# Patient Record
Sex: FEMALE | Race: WHITE | Employment: OTHER | URBAN - NONMETROPOLITAN AREA
[De-identification: names, ages, dates, MRNs, and addresses within clinical notes are randomized per-mention and may not be internally consistent; named-entity substitution may affect disease eponyms.]

---

## 2017-01-11 ENCOUNTER — HOSPITAL ENCOUNTER (OUTPATIENT)
Dept: PAIN MANAGEMENT | Age: 52
Discharge: HOME | End: 2017-01-11
Payer: MEDICARE

## 2017-01-11 DIAGNOSIS — G56.23 ULNAR NEUROPATHY OF BOTH UPPER EXTREMITIES: ICD-10-CM

## 2017-01-11 PROCEDURE — 62369 ANAL SP INF PMP W/REPRG&FILL: CPT

## 2017-01-11 PROCEDURE — 2500000003 HC RX 250 WO HCPCS

## 2017-01-11 PROCEDURE — 6360000002 HC RX W HCPCS

## 2017-01-11 RX ORDER — PREGABALIN 150 MG/1
150 CAPSULE ORAL 2 TIMES DAILY
Qty: 60 CAPSULE | Refills: 2 | Status: SHIPPED | OUTPATIENT
Start: 2017-01-11 | End: 2017-04-20 | Stop reason: SDUPTHER

## 2017-01-11 RX ORDER — OXYCODONE AND ACETAMINOPHEN 10; 325 MG/1; MG/1
TABLET ORAL
Qty: 240 TABLET | Refills: 0 | Status: SHIPPED | OUTPATIENT
Start: 2017-01-11 | End: 2017-03-28 | Stop reason: SDUPTHER

## 2017-01-11 RX ORDER — TIZANIDINE 4 MG/1
8 TABLET ORAL 3 TIMES DAILY PRN
Qty: 180 TABLET | Refills: 2 | Status: SHIPPED | OUTPATIENT
Start: 2017-01-11 | End: 2017-04-20 | Stop reason: SDUPTHER

## 2017-01-11 RX ORDER — CARBAMAZEPINE 200 MG/1
200 TABLET ORAL 2 TIMES DAILY
Qty: 60 TABLET | Refills: 2 | Status: SHIPPED | OUTPATIENT
Start: 2017-01-11 | End: 2017-04-20 | Stop reason: SDUPTHER

## 2017-01-25 ENCOUNTER — HOSPITAL ENCOUNTER (OUTPATIENT)
Dept: GENERAL RADIOLOGY | Facility: HOSPITAL | Age: 52
Discharge: HOME | End: 2017-01-25
Admitting: INTERNAL MEDICINE

## 2017-01-25 ENCOUNTER — TRANSCRIBE ORDERS (OUTPATIENT)
Dept: GENERAL RADIOLOGY | Facility: HOSPITAL | Age: 52
End: 2017-01-25

## 2017-01-25 DIAGNOSIS — K59.00 UNSPECIFIED CONSTIPATION: Primary | ICD-10-CM

## 2017-01-25 PROCEDURE — 74000 HC ABDOMEN KUB: CPT

## 2017-02-14 ENCOUNTER — HOSPITAL ENCOUNTER (OUTPATIENT)
Facility: HOSPITAL | Age: 52
Discharge: HOME | End: 2017-03-20
Attending: INTERNAL MEDICINE | Admitting: INTERNAL MEDICINE

## 2017-02-14 ENCOUNTER — APPOINTMENT (OUTPATIENT)
Dept: GENERAL RADIOLOGY | Facility: HOSPITAL | Age: 52
End: 2017-02-14

## 2017-02-14 DIAGNOSIS — R13.12 OROPHARYNGEAL DYSPHAGIA: Primary | ICD-10-CM

## 2017-02-14 DIAGNOSIS — Z78.9 DECREASED ACTIVITIES OF DAILY LIVING (ADL): ICD-10-CM

## 2017-02-14 PROCEDURE — 25010000002 LORAZEPAM PER 2 MG: Performed by: INTERNAL MEDICINE

## 2017-02-14 PROCEDURE — 25010000002 MORPHINE SULFATE (PF) 2 MG/ML SOLUTION: Performed by: INTERNAL MEDICINE

## 2017-02-14 PROCEDURE — 93005 ELECTROCARDIOGRAM TRACING: CPT | Performed by: INTERNAL MEDICINE

## 2017-02-14 PROCEDURE — 71010 HC CHEST PA OR AP: CPT

## 2017-02-14 PROCEDURE — 63710000001 INSULIN LISPRO (HUMAN) PER 5 UNITS: Performed by: INTERNAL MEDICINE

## 2017-02-14 PROCEDURE — 25010000002 PIPERACILLIN SOD-TAZOBACTAM PER 1 G: Performed by: INTERNAL MEDICINE

## 2017-02-14 PROCEDURE — 25010000002 HYDROCORTISONE SODIUM SUCCINATE 100 MG RECONSTITUTED SOLUTION: Performed by: INTERNAL MEDICINE

## 2017-02-14 RX ORDER — FAMOTIDINE 10 MG/ML
20 INJECTION, SOLUTION INTRAVENOUS 2 TIMES DAILY
Status: DISCONTINUED | OUTPATIENT
Start: 2017-02-14 | End: 2017-02-22 | Stop reason: SDUPTHER

## 2017-02-14 RX ORDER — ALBUTEROL SULFATE 2.5 MG/3ML
2.5 SOLUTION RESPIRATORY (INHALATION)
Status: DISCONTINUED | OUTPATIENT
Start: 2017-02-14 | End: 2017-02-16

## 2017-02-14 RX ORDER — MINERAL OIL AND WHITE PETROLATUM 150; 830 MG/G; MG/G
OINTMENT OPHTHALMIC 2 TIMES DAILY
Status: DISCONTINUED | OUTPATIENT
Start: 2017-02-14 | End: 2017-03-20 | Stop reason: HOSPADM

## 2017-02-14 RX ORDER — HYDRALAZINE HYDROCHLORIDE 20 MG/ML
5 INJECTION INTRAMUSCULAR; INTRAVENOUS EVERY 8 HOURS PRN
Status: DISCONTINUED | OUTPATIENT
Start: 2017-02-14 | End: 2017-03-20 | Stop reason: HOSPADM

## 2017-02-14 RX ORDER — L.ACID,PARA/B.BIFIDUM/S.THERM 8B CELL
1 CAPSULE ORAL DAILY
Status: DISCONTINUED | OUTPATIENT
Start: 2017-02-14 | End: 2017-03-20 | Stop reason: HOSPADM

## 2017-02-14 RX ORDER — LORAZEPAM 2 MG/ML
2 INJECTION INTRAMUSCULAR
Status: DISCONTINUED | OUTPATIENT
Start: 2017-02-14 | End: 2017-02-25 | Stop reason: ALTCHOICE

## 2017-02-14 RX ORDER — SODIUM CHLORIDE 0.9 % (FLUSH) 0.9 %
10 SYRINGE (ML) INJECTION EVERY 12 HOURS
Status: DISCONTINUED | OUTPATIENT
Start: 2017-02-14 | End: 2017-03-06 | Stop reason: SDUPTHER

## 2017-02-14 RX ORDER — FLUCONAZOLE 2 MG/ML
200 INJECTION, SOLUTION INTRAVENOUS EVERY 24 HOURS
Status: DISCONTINUED | OUTPATIENT
Start: 2017-02-15 | End: 2017-02-22 | Stop reason: CLARIF

## 2017-02-14 RX ORDER — MIDODRINE HYDROCHLORIDE 10 MG/1
20 TABLET ORAL 3 TIMES DAILY
Status: DISCONTINUED | OUTPATIENT
Start: 2017-02-14 | End: 2017-02-19

## 2017-02-14 RX ORDER — ACETAMINOPHEN 160 MG/5ML
650 SOLUTION ORAL EVERY 4 HOURS PRN
Status: DISCONTINUED | OUTPATIENT
Start: 2017-02-14 | End: 2017-03-20 | Stop reason: HOSPADM

## 2017-02-14 RX ORDER — BUMETANIDE 0.25 MG/ML
2 INJECTION INTRAMUSCULAR; INTRAVENOUS EVERY 12 HOURS
Status: DISCONTINUED | OUTPATIENT
Start: 2017-02-14 | End: 2017-02-18 | Stop reason: DRUGHIGH

## 2017-02-14 RX ORDER — MORPHINE SULFATE 2 MG/ML
2 INJECTION, SOLUTION INTRAMUSCULAR; INTRAVENOUS
Status: DISCONTINUED | OUTPATIENT
Start: 2017-02-14 | End: 2017-02-25 | Stop reason: ALTCHOICE

## 2017-02-14 RX ORDER — ASCORBIC ACID 500 MG
500 TABLET ORAL 2 TIMES DAILY
Status: DISCONTINUED | OUTPATIENT
Start: 2017-02-14 | End: 2017-03-20 | Stop reason: HOSPADM

## 2017-02-14 RX ORDER — MIDAZOLAM HYDROCHLORIDE 1 MG/ML
5 INJECTION INTRAMUSCULAR; INTRAVENOUS
Status: DISCONTINUED | OUTPATIENT
Start: 2017-02-14 | End: 2017-02-25 | Stop reason: ALTCHOICE

## 2017-02-14 RX ORDER — METOPROLOL SUCCINATE 25 MG/1
TABLET, EXTENDED RELEASE ORAL
Qty: 30 TABLET | Refills: 2 | Status: SHIPPED | OUTPATIENT
Start: 2017-02-14 | End: 2017-05-18 | Stop reason: SDUPTHER

## 2017-02-14 RX ORDER — PREGABALIN 75 MG/1
75 CAPSULE ORAL 2 TIMES DAILY
Status: DISCONTINUED | OUTPATIENT
Start: 2017-02-14 | End: 2017-03-20 | Stop reason: HOSPADM

## 2017-02-14 RX ORDER — POLYETHYLENE GLYCOL 3350 17 G/17G
17 POWDER, FOR SOLUTION ORAL DAILY PRN
Status: DISCONTINUED | OUTPATIENT
Start: 2017-02-14 | End: 2017-03-20 | Stop reason: HOSPADM

## 2017-02-14 RX ORDER — SUCRALFATE ORAL 1 G/10ML
1 SUSPENSION ORAL EVERY 6 HOURS SCHEDULED
Status: DISCONTINUED | OUTPATIENT
Start: 2017-02-14 | End: 2017-03-20 | Stop reason: HOSPADM

## 2017-02-14 RX ORDER — METOLAZONE 2.5 MG/1
2.5 TABLET ORAL DAILY
Status: DISCONTINUED | OUTPATIENT
Start: 2017-02-15 | End: 2017-03-13

## 2017-02-14 RX ORDER — POTASSIUM CHLORIDE 20MEQ/15ML
40 LIQUID (ML) ORAL 3 TIMES DAILY
Status: DISCONTINUED | OUTPATIENT
Start: 2017-02-14 | End: 2017-02-16

## 2017-02-14 RX ORDER — OXYBUTYNIN CHLORIDE 5 MG/1
5 TABLET ORAL 2 TIMES DAILY
Status: DISCONTINUED | OUTPATIENT
Start: 2017-02-14 | End: 2017-03-20 | Stop reason: HOSPADM

## 2017-02-14 RX ORDER — MORPHINE SULFATE 10 MG/ML
10 INJECTION INTRAMUSCULAR; INTRAVENOUS; SUBCUTANEOUS AS NEEDED
Status: DISCONTINUED | OUTPATIENT
Start: 2017-02-14 | End: 2017-02-25 | Stop reason: ALTCHOICE

## 2017-02-14 RX ORDER — ZINC SULFATE 50(220)MG
220 CAPSULE ORAL DAILY
Status: DISCONTINUED | OUTPATIENT
Start: 2017-02-15 | End: 2017-03-20 | Stop reason: HOSPADM

## 2017-02-14 RX ORDER — PEDIATRIC MULTIPLE VITAMIN LIQ
5 LIQUID ORAL DAILY
Status: DISCONTINUED | OUTPATIENT
Start: 2017-02-15 | End: 2017-03-20 | Stop reason: HOSPADM

## 2017-02-14 RX ORDER — ESCITALOPRAM OXALATE 10 MG/1
20 TABLET ORAL DAILY
Status: DISCONTINUED | OUTPATIENT
Start: 2017-02-15 | End: 2017-03-20 | Stop reason: HOSPADM

## 2017-02-14 RX ORDER — AMITRIPTYLINE HYDROCHLORIDE 25 MG/1
25 TABLET, FILM COATED ORAL NIGHTLY
Status: DISCONTINUED | OUTPATIENT
Start: 2017-02-14 | End: 2017-02-17 | Stop reason: SDUPTHER

## 2017-02-14 RX ORDER — CELECOXIB 200 MG/1
200 CAPSULE ORAL 2 TIMES DAILY
Status: DISCONTINUED | OUTPATIENT
Start: 2017-02-14 | End: 2017-03-20 | Stop reason: HOSPADM

## 2017-02-14 RX ORDER — ONDANSETRON 2 MG/ML
4 INJECTION INTRAMUSCULAR; INTRAVENOUS EVERY 4 HOURS PRN
Status: DISCONTINUED | OUTPATIENT
Start: 2017-02-14 | End: 2017-03-20 | Stop reason: HOSPADM

## 2017-02-14 RX ORDER — MAGNESIUM HYDROXIDE/ALUMINUM HYDROXICE/SIMETHICONE 120; 1200; 1200 MG/30ML; MG/30ML; MG/30ML
30 SUSPENSION ORAL EVERY 6 HOURS PRN
Status: DISCONTINUED | OUTPATIENT
Start: 2017-02-14 | End: 2017-03-20 | Stop reason: HOSPADM

## 2017-02-14 RX ORDER — DOCUSATE SODIUM 100 MG/1
100 CAPSULE, LIQUID FILLED ORAL DAILY
Status: DISCONTINUED | OUTPATIENT
Start: 2017-02-14 | End: 2017-03-20 | Stop reason: HOSPADM

## 2017-02-14 RX ORDER — PANTOPRAZOLE SODIUM 40 MG/10ML
40 INJECTION, POWDER, LYOPHILIZED, FOR SOLUTION INTRAVENOUS
Status: DISCONTINUED | OUTPATIENT
Start: 2017-02-15 | End: 2017-02-22 | Stop reason: SDUPTHER

## 2017-02-14 RX ORDER — CARBAMAZEPINE 200 MG/1
200 TABLET ORAL 2 TIMES DAILY
Status: DISCONTINUED | OUTPATIENT
Start: 2017-02-14 | End: 2017-03-20 | Stop reason: HOSPADM

## 2017-02-14 RX ORDER — CHLORHEXIDINE GLUCONATE 0.12 MG/ML
15 RINSE ORAL EVERY 12 HOURS SCHEDULED
Status: DISCONTINUED | OUTPATIENT
Start: 2017-02-14 | End: 2017-03-20 | Stop reason: HOSPADM

## 2017-02-14 RX ORDER — ATORVASTATIN CALCIUM 40 MG/1
40 TABLET, FILM COATED ORAL NIGHTLY
Status: DISCONTINUED | OUTPATIENT
Start: 2017-02-14 | End: 2017-03-20 | Stop reason: HOSPADM

## 2017-02-14 RX ORDER — ENALAPRILAT 2.5 MG/2ML
0.62 INJECTION INTRAVENOUS EVERY 6 HOURS PRN
Status: DISCONTINUED | OUTPATIENT
Start: 2017-02-14 | End: 2017-03-20 | Stop reason: HOSPADM

## 2017-02-15 LAB
ALBUMIN SERPL-MCNC: 2.8 G/DL (ref 3.5–5)
ALBUMIN/GLOB SERPL: 1.1 G/DL (ref 1.1–2.5)
ALP SERPL-CCNC: 70 U/L (ref 24–120)
ALT SERPL W P-5'-P-CCNC: 42 U/L (ref 0–54)
ANION GAP SERPL CALCULATED.3IONS-SCNC: ABNORMAL MMOL/L (ref 4–13)
ARTERIAL PATENCY WRIST A: ABNORMAL
AST SERPL-CCNC: 42 U/L (ref 7–45)
ATMOSPHERIC PRESS: ABNORMAL MMHG
BASE EXCESS BLDA CALC-SCNC: 15.3 MMOL/L (ref -2–2)
BASOPHILS # BLD AUTO: 0.01 10*3/MM3 (ref 0–0.2)
BASOPHILS NFR BLD AUTO: 0.1 % (ref 0–2)
BDY SITE: ABNORMAL
BILIRUB SERPL-MCNC: 0.3 MG/DL (ref 0.1–1)
BUN BLD-MCNC: 16 MG/DL (ref 5–21)
BUN/CREAT SERPL: 47.1 (ref 7–25)
CALCIUM SPEC-SCNC: 8.2 MG/DL (ref 8.4–10.4)
CHLORIDE SERPL-SCNC: 92 MMOL/L (ref 98–110)
CO2 SERPL-SCNC: >40 MMOL/L (ref 24–31)
CREAT BLD-MCNC: 0.34 MG/DL (ref 0.5–1.4)
DEPRECATED RDW RBC AUTO: 73.5 FL (ref 40–54)
EOSINOPHIL # BLD AUTO: 0.01 10*3/MM3 (ref 0–0.7)
EOSINOPHIL NFR BLD AUTO: 0.1 % (ref 0–4)
ERYTHROCYTE [DISTWIDTH] IN BLOOD BY AUTOMATED COUNT: 21.7 % (ref 12–15)
GFR SERPL CREATININE-BSD FRML MDRD: >150 ML/MIN/1.73
GLOBULIN UR ELPH-MCNC: 2.5 GM/DL
GLUCOSE BLD-MCNC: 182 MG/DL (ref 70–100)
GLUCOSE BLDC GLUCOMTR-MCNC: 132 MG/DL (ref 70–130)
GLUCOSE BLDC GLUCOMTR-MCNC: 149 MG/DL (ref 70–130)
GLUCOSE BLDC GLUCOMTR-MCNC: 168 MG/DL (ref 70–130)
GLUCOSE BLDC GLUCOMTR-MCNC: 201 MG/DL (ref 70–130)
HCO3 BLDA-SCNC: 39.6 MMOL/L (ref 22–26)
HCT VFR BLD AUTO: 28.4 % (ref 37–47)
HGB BLD-MCNC: 8.9 G/DL (ref 12–16)
HOROWITZ INDEX BLD+IHG-RTO: 45 %
IMM GRANULOCYTES # BLD: 0.04 10*3/MM3 (ref 0–0.03)
IMM GRANULOCYTES NFR BLD: 0.4 % (ref 0–5)
LYMPHOCYTES # BLD AUTO: 0.23 10*3/MM3 (ref 0.72–4.86)
LYMPHOCYTES NFR BLD AUTO: 2.4 % (ref 15–45)
Lab: ABNORMAL
MCH RBC QN AUTO: 29.6 PG (ref 28–32)
MCHC RBC AUTO-ENTMCNC: 31.3 G/DL (ref 33–36)
MCV RBC AUTO: 94.4 FL (ref 82–98)
MODALITY: ABNORMAL
MONOCYTES # BLD AUTO: 0.14 10*3/MM3 (ref 0.19–1.3)
MONOCYTES NFR BLD AUTO: 1.5 % (ref 4–12)
NEUTROPHILS # BLD AUTO: 9.01 10*3/MM3 (ref 1.87–8.4)
NEUTROPHILS NFR BLD AUTO: 95.5 % (ref 39–78)
NOTIFIED BY: ABNORMAL
NOTIFIED WHO: ABNORMAL
PCO2 BLDA: 45.7 MM HG (ref 35–45)
PH BLDA: 7.56 PH UNITS (ref 7.35–7.45)
PLATELET # BLD AUTO: 127 10*3/MM3 (ref 130–400)
PMV BLD AUTO: 11.7 FL (ref 6–12)
PO2 BLDA: 108.4 MM HG (ref 80–100)
POTASSIUM BLD-SCNC: 2.9 MMOL/L (ref 3.5–5.3)
PREALB SERPL-MCNC: 26.2 MG/DL (ref 18–36)
PROT SERPL-MCNC: 5.3 G/DL (ref 6.3–8.7)
RBC # BLD AUTO: 3.01 10*6/MM3 (ref 4.2–5.4)
SAO2 % BLDCOA: 98.4 % (ref 94–100)
SAO2 % BLDCOA: 98.4 % (ref 94–100)
SODIUM BLD-SCNC: 143 MMOL/L (ref 135–145)
TOTAL RATE: 12 BREATHS/MINUTE
VENTILATOR MODE: AC
WBC NRBC COR # BLD: 9.44 10*3/MM3 (ref 4.8–10.8)

## 2017-02-15 PROCEDURE — 82962 GLUCOSE BLOOD TEST: CPT

## 2017-02-15 PROCEDURE — 84134 ASSAY OF PREALBUMIN: CPT | Performed by: INTERNAL MEDICINE

## 2017-02-15 PROCEDURE — 25010000002 LORAZEPAM PER 2 MG: Performed by: INTERNAL MEDICINE

## 2017-02-15 PROCEDURE — 25010000003 FLUCONAZOLE PER 200 MG: Performed by: INTERNAL MEDICINE

## 2017-02-15 PROCEDURE — 36600 WITHDRAWAL OF ARTERIAL BLOOD: CPT

## 2017-02-15 PROCEDURE — 63710000001 INSULIN LISPRO (HUMAN) PER 5 UNITS: Performed by: INTERNAL MEDICINE

## 2017-02-15 PROCEDURE — 97167 OT EVAL HIGH COMPLEX 60 MIN: CPT

## 2017-02-15 PROCEDURE — 93010 ELECTROCARDIOGRAM REPORT: CPT | Performed by: INTERNAL MEDICINE

## 2017-02-15 PROCEDURE — 97162 PT EVAL MOD COMPLEX 30 MIN: CPT | Performed by: PHYSICAL THERAPIST

## 2017-02-15 PROCEDURE — 25010000002 HYDROCORTISONE SODIUM SUCCINATE 100 MG RECONSTITUTED SOLUTION: Performed by: INTERNAL MEDICINE

## 2017-02-15 PROCEDURE — 85025 COMPLETE CBC W/AUTO DIFF WBC: CPT | Performed by: INTERNAL MEDICINE

## 2017-02-15 PROCEDURE — 80053 COMPREHEN METABOLIC PANEL: CPT | Performed by: INTERNAL MEDICINE

## 2017-02-15 PROCEDURE — 82803 BLOOD GASES ANY COMBINATION: CPT

## 2017-02-15 PROCEDURE — 25010000002 MORPHINE SULFATE (PF) 2 MG/ML SOLUTION: Performed by: INTERNAL MEDICINE

## 2017-02-15 PROCEDURE — 25010000002 PIPERACILLIN SOD-TAZOBACTAM PER 1 G: Performed by: INTERNAL MEDICINE

## 2017-02-15 PROCEDURE — 97605 NEG PRS WND THER DME<=50SQCM: CPT | Performed by: PHYSICAL THERAPIST

## 2017-02-15 RX ORDER — POTASSIUM CHLORIDE 750 MG/1
40 CAPSULE, EXTENDED RELEASE ORAL ONCE
Status: DISCONTINUED | OUTPATIENT
Start: 2017-02-15 | End: 2017-02-16

## 2017-02-16 LAB
ALBUMIN SERPL-MCNC: 2.9 G/DL (ref 3.5–5)
ALBUMIN/GLOB SERPL: 1 G/DL (ref 1.1–2.5)
ALP SERPL-CCNC: 76 U/L (ref 24–120)
ALT SERPL W P-5'-P-CCNC: 45 U/L (ref 0–54)
ANION GAP SERPL CALCULATED.3IONS-SCNC: ABNORMAL MMOL/L (ref 4–13)
AST SERPL-CCNC: 51 U/L (ref 7–45)
BASOPHILS # BLD AUTO: 0.01 10*3/MM3 (ref 0–0.2)
BASOPHILS NFR BLD AUTO: 0.2 % (ref 0–2)
BILIRUB SERPL-MCNC: 0.3 MG/DL (ref 0.1–1)
BUN BLD-MCNC: 17 MG/DL (ref 5–21)
BUN/CREAT SERPL: 53.1 (ref 7–25)
CALCIUM SPEC-SCNC: 8.3 MG/DL (ref 8.4–10.4)
CHLORIDE SERPL-SCNC: 88 MMOL/L (ref 98–110)
CO2 SERPL-SCNC: >40 MMOL/L (ref 24–31)
CREAT BLD-MCNC: 0.32 MG/DL (ref 0.5–1.4)
DEPRECATED RDW RBC AUTO: 74.8 FL (ref 40–54)
EOSINOPHIL # BLD AUTO: 0.02 10*3/MM3 (ref 0–0.7)
EOSINOPHIL NFR BLD AUTO: 0.3 % (ref 0–4)
ERYTHROCYTE [DISTWIDTH] IN BLOOD BY AUTOMATED COUNT: 21.4 % (ref 12–15)
ERYTHROCYTE [SEDIMENTATION RATE] IN BLOOD: 27 MM/HR (ref 0–20)
GFR SERPL CREATININE-BSD FRML MDRD: >150 ML/MIN/1.73
GLOBULIN UR ELPH-MCNC: 2.8 GM/DL
GLUCOSE BLD-MCNC: 141 MG/DL (ref 70–100)
GLUCOSE BLDC GLUCOMTR-MCNC: 108 MG/DL (ref 70–130)
GLUCOSE BLDC GLUCOMTR-MCNC: 124 MG/DL (ref 70–130)
GLUCOSE BLDC GLUCOMTR-MCNC: 149 MG/DL (ref 70–130)
GLUCOSE BLDC GLUCOMTR-MCNC: 199 MG/DL (ref 70–130)
HCT VFR BLD AUTO: 29.4 % (ref 37–47)
HGB BLD-MCNC: 9.1 G/DL (ref 12–16)
IMM GRANULOCYTES # BLD: 0.02 10*3/MM3 (ref 0–0.03)
IMM GRANULOCYTES NFR BLD: 0.3 % (ref 0–5)
LYMPHOCYTES # BLD AUTO: 0.4 10*3/MM3 (ref 0.72–4.86)
LYMPHOCYTES NFR BLD AUTO: 6.9 % (ref 15–45)
MCH RBC QN AUTO: 29.4 PG (ref 28–32)
MCHC RBC AUTO-ENTMCNC: 31 G/DL (ref 33–36)
MCV RBC AUTO: 94.8 FL (ref 82–98)
MONOCYTES # BLD AUTO: 0.23 10*3/MM3 (ref 0.19–1.3)
MONOCYTES NFR BLD AUTO: 4 % (ref 4–12)
NEUTROPHILS # BLD AUTO: 5.12 10*3/MM3 (ref 1.87–8.4)
NEUTROPHILS NFR BLD AUTO: 88.3 % (ref 39–78)
PLATELET # BLD AUTO: 142 10*3/MM3 (ref 130–400)
PMV BLD AUTO: 12.8 FL (ref 6–12)
POTASSIUM BLD-SCNC: 2.2 MMOL/L (ref 3.5–5.3)
PROT SERPL-MCNC: 5.7 G/DL (ref 6.3–8.7)
RBC # BLD AUTO: 3.1 10*6/MM3 (ref 4.2–5.4)
SODIUM BLD-SCNC: 141 MMOL/L (ref 135–145)
WBC NRBC COR # BLD: 5.8 10*3/MM3 (ref 4.8–10.8)

## 2017-02-16 PROCEDURE — 25010000002 HYDROCORTISONE SODIUM SUCCINATE 100 MG RECONSTITUTED SOLUTION: Performed by: INTERNAL MEDICINE

## 2017-02-16 PROCEDURE — 85651 RBC SED RATE NONAUTOMATED: CPT | Performed by: INTERNAL MEDICINE

## 2017-02-16 PROCEDURE — 82962 GLUCOSE BLOOD TEST: CPT

## 2017-02-16 PROCEDURE — 63710000001 INSULIN DETEMIR PER 5 UNITS: Performed by: NURSE PRACTITIONER

## 2017-02-16 PROCEDURE — 80053 COMPREHEN METABOLIC PANEL: CPT | Performed by: INTERNAL MEDICINE

## 2017-02-16 PROCEDURE — 25010000003 FLUCONAZOLE PER 200 MG: Performed by: INTERNAL MEDICINE

## 2017-02-16 PROCEDURE — 25010000002 POTASSIUM CHLORIDE PER 2 MEQ: Performed by: NURSE PRACTITIONER

## 2017-02-16 PROCEDURE — 25010000002 MORPHINE SULFATE (PF) 2 MG/ML SOLUTION: Performed by: INTERNAL MEDICINE

## 2017-02-16 PROCEDURE — 97535 SELF CARE MNGMENT TRAINING: CPT

## 2017-02-16 PROCEDURE — 85025 COMPLETE CBC W/AUTO DIFF WBC: CPT | Performed by: INTERNAL MEDICINE

## 2017-02-16 PROCEDURE — 97535 SELF CARE MNGMENT TRAINING: CPT | Performed by: OCCUPATIONAL THERAPIST

## 2017-02-16 PROCEDURE — 25010000002 PIPERACILLIN SOD-TAZOBACTAM PER 1 G: Performed by: INTERNAL MEDICINE

## 2017-02-16 PROCEDURE — 97530 THERAPEUTIC ACTIVITIES: CPT

## 2017-02-16 RX ORDER — POTASSIUM CHLORIDE 20MEQ/15ML
20 LIQUID (ML) ORAL ONCE
Status: DISCONTINUED | OUTPATIENT
Start: 2017-02-16 | End: 2017-02-18

## 2017-02-16 RX ORDER — POTASSIUM CHLORIDE 20MEQ/15ML
40 LIQUID (ML) ORAL DAILY
Status: DISCONTINUED | OUTPATIENT
Start: 2017-02-17 | End: 2017-02-23 | Stop reason: SDUPTHER

## 2017-02-16 RX ORDER — IPRATROPIUM BROMIDE AND ALBUTEROL SULFATE 2.5; .5 MG/3ML; MG/3ML
3 SOLUTION RESPIRATORY (INHALATION)
Status: DISCONTINUED | OUTPATIENT
Start: 2017-02-16 | End: 2017-03-06

## 2017-02-16 RX ORDER — POTASSIUM CHLORIDE 14.9 MG/ML
20 INJECTION INTRAVENOUS
Status: DISPENSED | OUTPATIENT
Start: 2017-02-16 | End: 2017-02-16

## 2017-02-17 LAB
ANION GAP SERPL CALCULATED.3IONS-SCNC: ABNORMAL MMOL/L (ref 4–13)
BUN BLD-MCNC: 19 MG/DL (ref 5–21)
BUN/CREAT SERPL: 70.4 (ref 7–25)
CALCIUM SPEC-SCNC: 8.4 MG/DL (ref 8.4–10.4)
CHLORIDE SERPL-SCNC: 86 MMOL/L (ref 98–110)
CO2 SERPL-SCNC: >40 MMOL/L (ref 24–31)
CREAT BLD-MCNC: 0.27 MG/DL (ref 0.5–1.4)
GFR SERPL CREATININE-BSD FRML MDRD: >150 ML/MIN/1.73
GLUCOSE BLD-MCNC: 122 MG/DL (ref 70–100)
GLUCOSE BLDC GLUCOMTR-MCNC: 129 MG/DL (ref 70–130)
GLUCOSE BLDC GLUCOMTR-MCNC: 137 MG/DL (ref 70–130)
GLUCOSE BLDC GLUCOMTR-MCNC: 141 MG/DL (ref 70–130)
GLUCOSE BLDC GLUCOMTR-MCNC: 166 MG/DL (ref 70–130)
GLUCOSE BLDC GLUCOMTR-MCNC: 93 MG/DL (ref 70–130)
MAGNESIUM SERPL-MCNC: 2.1 MG/DL (ref 1.4–2.2)
POTASSIUM BLD-SCNC: 2.5 MMOL/L (ref 3.5–5.3)
SODIUM BLD-SCNC: 139 MMOL/L (ref 135–145)

## 2017-02-17 PROCEDURE — 97535 SELF CARE MNGMENT TRAINING: CPT

## 2017-02-17 PROCEDURE — 25010000002 ONDANSETRON PER 1 MG: Performed by: INTERNAL MEDICINE

## 2017-02-17 PROCEDURE — 97110 THERAPEUTIC EXERCISES: CPT

## 2017-02-17 PROCEDURE — 92610 EVALUATE SWALLOWING FUNCTION: CPT

## 2017-02-17 PROCEDURE — 97530 THERAPEUTIC ACTIVITIES: CPT

## 2017-02-17 PROCEDURE — 80048 BASIC METABOLIC PNL TOTAL CA: CPT | Performed by: INTERNAL MEDICINE

## 2017-02-17 PROCEDURE — 63710000001 PROMETHAZINE PER 25 MG: Performed by: INTERNAL MEDICINE

## 2017-02-17 PROCEDURE — 92597 ORAL SPEECH DEVICE EVAL: CPT

## 2017-02-17 PROCEDURE — 25010000002 HYDRALAZINE PER 20 MG: Performed by: INTERNAL MEDICINE

## 2017-02-17 PROCEDURE — 97607 NEG PRS WND THR NDME<=50SQCM: CPT

## 2017-02-17 PROCEDURE — 25010000002 PIPERACILLIN SOD-TAZOBACTAM PER 1 G: Performed by: INTERNAL MEDICINE

## 2017-02-17 PROCEDURE — L8501 TRACHEOSTOMY SPEAKING VALVE: HCPCS

## 2017-02-17 PROCEDURE — 25010000003 POTASSIUM CHLORIDE PER 2 MEQ: Performed by: INTERNAL MEDICINE

## 2017-02-17 PROCEDURE — 97606 NEG PRS WND THER DME>50 SQCM: CPT

## 2017-02-17 PROCEDURE — 82962 GLUCOSE BLOOD TEST: CPT

## 2017-02-17 PROCEDURE — 25010000002 HYDROCORTISONE SODIUM SUCCINATE 100 MG RECONSTITUTED SOLUTION: Performed by: INTERNAL MEDICINE

## 2017-02-17 PROCEDURE — 25010000003 FLUCONAZOLE PER 200 MG: Performed by: INTERNAL MEDICINE

## 2017-02-17 PROCEDURE — 83735 ASSAY OF MAGNESIUM: CPT | Performed by: INTERNAL MEDICINE

## 2017-02-17 PROCEDURE — 25010000002 LORAZEPAM PER 2 MG: Performed by: INTERNAL MEDICINE

## 2017-02-17 RX ORDER — ALPRAZOLAM 0.25 MG/1
0.25 TABLET ORAL 2 TIMES DAILY PRN
Status: DISCONTINUED | OUTPATIENT
Start: 2017-02-17 | End: 2017-02-25

## 2017-02-17 RX ORDER — PROMETHAZINE HYDROCHLORIDE 6.25 MG/5ML
12.5 SYRUP ORAL EVERY 4 HOURS PRN
Status: DISCONTINUED | OUTPATIENT
Start: 2017-02-17 | End: 2017-03-20 | Stop reason: HOSPADM

## 2017-02-17 RX ORDER — POTASSIUM CHLORIDE 29.8 MG/ML
20 INJECTION INTRAVENOUS
Status: DISPENSED | OUTPATIENT
Start: 2017-02-17 | End: 2017-02-17

## 2017-02-17 RX ORDER — AMITRIPTYLINE HYDROCHLORIDE 25 MG/1
50 TABLET, FILM COATED ORAL NIGHTLY
Status: DISCONTINUED | OUTPATIENT
Start: 2017-02-17 | End: 2017-03-20 | Stop reason: HOSPADM

## 2017-02-18 LAB
ANION GAP SERPL CALCULATED.3IONS-SCNC: ABNORMAL MMOL/L (ref 4–13)
BUN BLD-MCNC: 22 MG/DL (ref 5–21)
BUN/CREAT SERPL: 84.6 (ref 7–25)
CALCIUM SPEC-SCNC: 8.8 MG/DL (ref 8.4–10.4)
CHLORIDE SERPL-SCNC: 86 MMOL/L (ref 98–110)
CO2 SERPL-SCNC: >40 MMOL/L (ref 24–31)
CREAT BLD-MCNC: 0.26 MG/DL (ref 0.5–1.4)
GFR SERPL CREATININE-BSD FRML MDRD: >150 ML/MIN/1.73
GLUCOSE BLD-MCNC: 129 MG/DL (ref 70–100)
GLUCOSE BLDC GLUCOMTR-MCNC: 148 MG/DL (ref 70–130)
GLUCOSE BLDC GLUCOMTR-MCNC: 168 MG/DL (ref 70–130)
GLUCOSE BLDC GLUCOMTR-MCNC: 172 MG/DL (ref 70–130)
POTASSIUM BLD-SCNC: 2.6 MMOL/L (ref 3.5–5.3)
POTASSIUM BLD-SCNC: 4.4 MMOL/L (ref 3.5–5.3)
SODIUM BLD-SCNC: 141 MMOL/L (ref 135–145)

## 2017-02-18 PROCEDURE — 97110 THERAPEUTIC EXERCISES: CPT

## 2017-02-18 PROCEDURE — 25010000002 HYDROCORTISONE SODIUM SUCCINATE 100 MG RECONSTITUTED SOLUTION: Performed by: INTERNAL MEDICINE

## 2017-02-18 PROCEDURE — 25010000002 PIPERACILLIN SOD-TAZOBACTAM PER 1 G: Performed by: INTERNAL MEDICINE

## 2017-02-18 PROCEDURE — 25010000003 FLUCONAZOLE PER 200 MG: Performed by: INTERNAL MEDICINE

## 2017-02-18 PROCEDURE — 80048 BASIC METABOLIC PNL TOTAL CA: CPT | Performed by: INTERNAL MEDICINE

## 2017-02-18 PROCEDURE — 25010000003 POTASSIUM CHLORIDE PER 2 MEQ: Performed by: INTERNAL MEDICINE

## 2017-02-18 PROCEDURE — 25010000002 MORPHINE SULFATE (PF) 2 MG/ML SOLUTION: Performed by: INTERNAL MEDICINE

## 2017-02-18 PROCEDURE — 82962 GLUCOSE BLOOD TEST: CPT

## 2017-02-18 PROCEDURE — 84132 ASSAY OF SERUM POTASSIUM: CPT | Performed by: INTERNAL MEDICINE

## 2017-02-18 RX ORDER — BUMETANIDE 0.25 MG/ML
1 INJECTION INTRAMUSCULAR; INTRAVENOUS DAILY
Status: DISCONTINUED | OUTPATIENT
Start: 2017-02-19 | End: 2017-02-23 | Stop reason: SDUPTHER

## 2017-02-18 RX ORDER — POTASSIUM CHLORIDE 29.8 MG/ML
20 INJECTION INTRAVENOUS
Status: DISPENSED | OUTPATIENT
Start: 2017-02-18 | End: 2017-02-18

## 2017-02-19 ENCOUNTER — APPOINTMENT (OUTPATIENT)
Dept: GENERAL RADIOLOGY | Facility: HOSPITAL | Age: 52
End: 2017-02-19

## 2017-02-19 LAB
GLUCOSE BLDC GLUCOMTR-MCNC: 128 MG/DL (ref 70–130)
GLUCOSE BLDC GLUCOMTR-MCNC: 155 MG/DL (ref 70–130)

## 2017-02-19 PROCEDURE — 71010 HC CHEST PA OR AP: CPT

## 2017-02-19 PROCEDURE — 82962 GLUCOSE BLOOD TEST: CPT

## 2017-02-19 PROCEDURE — 97110 THERAPEUTIC EXERCISES: CPT

## 2017-02-19 PROCEDURE — 92507 TX SP LANG VOICE COMM INDIV: CPT | Performed by: SPEECH-LANGUAGE PATHOLOGIST

## 2017-02-19 PROCEDURE — 25010000002 PIPERACILLIN SOD-TAZOBACTAM PER 1 G: Performed by: INTERNAL MEDICINE

## 2017-02-19 PROCEDURE — 25010000002 HYDROCORTISONE SODIUM SUCCINATE 100 MG RECONSTITUTED SOLUTION: Performed by: INTERNAL MEDICINE

## 2017-02-19 PROCEDURE — 25010000003 FLUCONAZOLE PER 200 MG: Performed by: INTERNAL MEDICINE

## 2017-02-19 PROCEDURE — 92526 ORAL FUNCTION THERAPY: CPT | Performed by: SPEECH-LANGUAGE PATHOLOGIST

## 2017-02-19 PROCEDURE — 97530 THERAPEUTIC ACTIVITIES: CPT

## 2017-02-19 RX ORDER — CLONIDINE HYDROCHLORIDE 0.1 MG/1
0.1 TABLET ORAL EVERY 4 HOURS PRN
Status: DISCONTINUED | OUTPATIENT
Start: 2017-02-19 | End: 2017-03-20 | Stop reason: HOSPADM

## 2017-02-20 ENCOUNTER — APPOINTMENT (OUTPATIENT)
Dept: GENERAL RADIOLOGY | Facility: HOSPITAL | Age: 52
End: 2017-02-20

## 2017-02-20 LAB
ANION GAP SERPL CALCULATED.3IONS-SCNC: ABNORMAL MMOL/L (ref 4–13)
BASOPHILS # BLD AUTO: 0.01 10*3/MM3 (ref 0–0.2)
BASOPHILS NFR BLD AUTO: 0.2 % (ref 0–2)
BUN BLD-MCNC: 21 MG/DL (ref 5–21)
BUN/CREAT SERPL: 75 (ref 7–25)
CALCIUM SPEC-SCNC: 8.8 MG/DL (ref 8.4–10.4)
CHLORIDE SERPL-SCNC: 92 MMOL/L (ref 98–110)
CO2 SERPL-SCNC: >40 MMOL/L (ref 24–31)
CREAT BLD-MCNC: 0.28 MG/DL (ref 0.5–1.4)
DEPRECATED RDW RBC AUTO: 71.2 FL (ref 40–54)
EOSINOPHIL # BLD AUTO: 0.06 10*3/MM3 (ref 0–0.7)
EOSINOPHIL NFR BLD AUTO: 1.1 % (ref 0–4)
ERYTHROCYTE [DISTWIDTH] IN BLOOD BY AUTOMATED COUNT: 20.4 % (ref 12–15)
GFR SERPL CREATININE-BSD FRML MDRD: >150 ML/MIN/1.73
GLUCOSE BLD-MCNC: 99 MG/DL (ref 70–100)
GLUCOSE BLDC GLUCOMTR-MCNC: 108 MG/DL (ref 70–130)
GLUCOSE BLDC GLUCOMTR-MCNC: 145 MG/DL (ref 70–130)
HCT VFR BLD AUTO: 29.8 % (ref 37–47)
HGB BLD-MCNC: 9.3 G/DL (ref 12–16)
IMM GRANULOCYTES # BLD: 0.06 10*3/MM3 (ref 0–0.03)
IMM GRANULOCYTES NFR BLD: 1.1 % (ref 0–5)
LYMPHOCYTES # BLD AUTO: 1.23 10*3/MM3 (ref 0.72–4.86)
LYMPHOCYTES NFR BLD AUTO: 21.7 % (ref 15–45)
MCH RBC QN AUTO: 29.8 PG (ref 28–32)
MCHC RBC AUTO-ENTMCNC: 31.2 G/DL (ref 33–36)
MCV RBC AUTO: 95.5 FL (ref 82–98)
MONOCYTES # BLD AUTO: 0.36 10*3/MM3 (ref 0.19–1.3)
MONOCYTES NFR BLD AUTO: 6.4 % (ref 4–12)
NEUTROPHILS # BLD AUTO: 3.94 10*3/MM3 (ref 1.87–8.4)
NEUTROPHILS NFR BLD AUTO: 69.5 % (ref 39–78)
PLATELET # BLD AUTO: 142 10*3/MM3 (ref 130–400)
PMV BLD AUTO: 11.9 FL (ref 6–12)
POTASSIUM BLD-SCNC: 2.7 MMOL/L (ref 3.5–5.3)
RBC # BLD AUTO: 3.12 10*6/MM3 (ref 4.2–5.4)
SODIUM BLD-SCNC: 140 MMOL/L (ref 135–145)
WBC NRBC COR # BLD: 5.66 10*3/MM3 (ref 4.8–10.8)

## 2017-02-20 PROCEDURE — 92611 MOTION FLUOROSCOPY/SWALLOW: CPT | Performed by: SPEECH-LANGUAGE PATHOLOGIST

## 2017-02-20 PROCEDURE — 25010000003 POTASSIUM CHLORIDE PER 2 MEQ: Performed by: INTERNAL MEDICINE

## 2017-02-20 PROCEDURE — 80048 BASIC METABOLIC PNL TOTAL CA: CPT | Performed by: INTERNAL MEDICINE

## 2017-02-20 PROCEDURE — 25010000002 PIPERACILLIN SOD-TAZOBACTAM PER 1 G: Performed by: INTERNAL MEDICINE

## 2017-02-20 PROCEDURE — 82962 GLUCOSE BLOOD TEST: CPT

## 2017-02-20 PROCEDURE — 97110 THERAPEUTIC EXERCISES: CPT

## 2017-02-20 PROCEDURE — 25010000002 MORPHINE SULFATE (PF) 2 MG/ML SOLUTION: Performed by: INTERNAL MEDICINE

## 2017-02-20 PROCEDURE — G8996 SWALLOW CURRENT STATUS: HCPCS | Performed by: SPEECH-LANGUAGE PATHOLOGIST

## 2017-02-20 PROCEDURE — 25010000003 FLUCONAZOLE PER 200 MG: Performed by: INTERNAL MEDICINE

## 2017-02-20 PROCEDURE — 97605 NEG PRS WND THER DME<=50SQCM: CPT | Performed by: PHYSICAL THERAPIST

## 2017-02-20 PROCEDURE — 97535 SELF CARE MNGMENT TRAINING: CPT

## 2017-02-20 PROCEDURE — G8997 SWALLOW GOAL STATUS: HCPCS | Performed by: SPEECH-LANGUAGE PATHOLOGIST

## 2017-02-20 PROCEDURE — 25010000002 HYDROCORTISONE SODIUM SUCCINATE 100 MG RECONSTITUTED SOLUTION: Performed by: INTERNAL MEDICINE

## 2017-02-20 PROCEDURE — 97607 NEG PRS WND THR NDME<=50SQCM: CPT | Performed by: PHYSICAL THERAPIST

## 2017-02-20 PROCEDURE — G8998 SWALLOW D/C STATUS: HCPCS | Performed by: SPEECH-LANGUAGE PATHOLOGIST

## 2017-02-20 PROCEDURE — 85025 COMPLETE CBC W/AUTO DIFF WBC: CPT | Performed by: INTERNAL MEDICINE

## 2017-02-20 PROCEDURE — 74230 X-RAY XM SWLNG FUNCJ C+: CPT

## 2017-02-20 PROCEDURE — 97530 THERAPEUTIC ACTIVITIES: CPT | Performed by: PHYSICAL THERAPIST

## 2017-02-20 RX ORDER — TIZANIDINE 4 MG/1
4 TABLET ORAL EVERY 8 HOURS PRN
Status: DISCONTINUED | OUTPATIENT
Start: 2017-02-20 | End: 2017-02-27

## 2017-02-20 RX ORDER — POTASSIUM CHLORIDE 29.8 MG/ML
20 INJECTION INTRAVENOUS
Status: DISPENSED | OUTPATIENT
Start: 2017-02-20 | End: 2017-02-20

## 2017-02-20 RX ORDER — MIDODRINE HYDROCHLORIDE 2.5 MG/1
5 TABLET ORAL DAILY
Status: DISCONTINUED | OUTPATIENT
Start: 2017-02-20 | End: 2017-03-20 | Stop reason: HOSPADM

## 2017-02-21 LAB
ANION GAP SERPL CALCULATED.3IONS-SCNC: 6 MMOL/L (ref 4–13)
BUN BLD-MCNC: 26 MG/DL (ref 5–21)
BUN/CREAT SERPL: 92.9 (ref 7–25)
CALCIUM SPEC-SCNC: 8.9 MG/DL (ref 8.4–10.4)
CHLORIDE SERPL-SCNC: 98 MMOL/L (ref 98–110)
CO2 SERPL-SCNC: 36 MMOL/L (ref 24–31)
CREAT BLD-MCNC: 0.28 MG/DL (ref 0.5–1.4)
GFR SERPL CREATININE-BSD FRML MDRD: >150 ML/MIN/1.73
GLUCOSE BLD-MCNC: 102 MG/DL (ref 70–100)
MAGNESIUM SERPL-MCNC: 2.2 MG/DL (ref 1.4–2.2)
POTASSIUM BLD-SCNC: 4.6 MMOL/L (ref 3.5–5.3)
SODIUM BLD-SCNC: 140 MMOL/L (ref 135–145)

## 2017-02-21 PROCEDURE — 25010000002 HYDROCORTISONE SODIUM SUCCINATE 100 MG RECONSTITUTED SOLUTION: Performed by: INTERNAL MEDICINE

## 2017-02-21 PROCEDURE — 92526 ORAL FUNCTION THERAPY: CPT

## 2017-02-21 PROCEDURE — 97535 SELF CARE MNGMENT TRAINING: CPT

## 2017-02-21 PROCEDURE — 97110 THERAPEUTIC EXERCISES: CPT

## 2017-02-21 PROCEDURE — 25010000002 LORAZEPAM PER 2 MG: Performed by: INTERNAL MEDICINE

## 2017-02-21 PROCEDURE — 80048 BASIC METABOLIC PNL TOTAL CA: CPT | Performed by: NURSE PRACTITIONER

## 2017-02-21 PROCEDURE — 83735 ASSAY OF MAGNESIUM: CPT | Performed by: NURSE PRACTITIONER

## 2017-02-21 PROCEDURE — 25010000002 MORPHINE SULFATE (PF) 2 MG/ML SOLUTION: Performed by: INTERNAL MEDICINE

## 2017-02-21 PROCEDURE — 97530 THERAPEUTIC ACTIVITIES: CPT

## 2017-02-21 PROCEDURE — 25010000002 PIPERACILLIN SOD-TAZOBACTAM PER 1 G: Performed by: INTERNAL MEDICINE

## 2017-02-21 PROCEDURE — 25010000003 FLUCONAZOLE PER 200 MG: Performed by: INTERNAL MEDICINE

## 2017-02-22 PROCEDURE — 25010000003 FLUCONAZOLE PER 200 MG: Performed by: INTERNAL MEDICINE

## 2017-02-22 PROCEDURE — 25010000002 HYDROCORTISONE SODIUM SUCCINATE 100 MG RECONSTITUTED SOLUTION: Performed by: INTERNAL MEDICINE

## 2017-02-22 PROCEDURE — 97607 NEG PRS WND THR NDME<=50SQCM: CPT | Performed by: PHYSICAL THERAPIST

## 2017-02-22 PROCEDURE — 92526 ORAL FUNCTION THERAPY: CPT

## 2017-02-22 PROCEDURE — 25010000002 ENOXAPARIN PER 10 MG: Performed by: INTERNAL MEDICINE

## 2017-02-22 PROCEDURE — 25010000002 PIPERACILLIN SOD-TAZOBACTAM PER 1 G: Performed by: INTERNAL MEDICINE

## 2017-02-22 PROCEDURE — 25010000002 MORPHINE SULFATE (PF) 2 MG/ML SOLUTION: Performed by: INTERNAL MEDICINE

## 2017-02-22 RX ORDER — LANSOPRAZOLE
30 KIT EVERY MORNING
Status: DISCONTINUED | OUTPATIENT
Start: 2017-02-23 | End: 2017-03-20 | Stop reason: HOSPADM

## 2017-02-22 RX ORDER — DIAPER,BRIEF,INFANT-TODD,DISP
EACH MISCELLANEOUS 2 TIMES DAILY
Status: DISCONTINUED | OUTPATIENT
Start: 2017-02-22 | End: 2017-03-20 | Stop reason: HOSPADM

## 2017-02-22 RX ORDER — FLUCONAZOLE 150 MG/1
150 TABLET ORAL DAILY
Status: DISCONTINUED | OUTPATIENT
Start: 2017-02-23 | End: 2017-03-14 | Stop reason: ALTCHOICE

## 2017-02-23 LAB
ANION GAP SERPL CALCULATED.3IONS-SCNC: 7 MMOL/L (ref 4–13)
BUN BLD-MCNC: 23 MG/DL (ref 5–21)
BUN/CREAT SERPL: 71.9 (ref 7–25)
CALCIUM SPEC-SCNC: 8.9 MG/DL (ref 8.4–10.4)
CHLORIDE SERPL-SCNC: 96 MMOL/L (ref 98–110)
CO2 SERPL-SCNC: 37 MMOL/L (ref 24–31)
CREAT BLD-MCNC: 0.32 MG/DL (ref 0.5–1.4)
DEPRECATED RDW RBC AUTO: 75.1 FL (ref 40–54)
ERYTHROCYTE [DISTWIDTH] IN BLOOD BY AUTOMATED COUNT: 21.8 % (ref 12–15)
GFR SERPL CREATININE-BSD FRML MDRD: >150 ML/MIN/1.73
GLUCOSE BLD-MCNC: 130 MG/DL (ref 70–100)
GLUCOSE BLDC GLUCOMTR-MCNC: 130 MG/DL (ref 70–130)
HCT VFR BLD AUTO: 28.7 % (ref 37–47)
HGB BLD-MCNC: 8.8 G/DL (ref 12–16)
LYMPHOCYTES # BLD MANUAL: 0.66 10*3/MM3 (ref 0.72–4.86)
LYMPHOCYTES NFR BLD MANUAL: 15 % (ref 15–45)
LYMPHOCYTES NFR BLD MANUAL: 8 % (ref 4–12)
MCH RBC QN AUTO: 29.6 PG (ref 28–32)
MCHC RBC AUTO-ENTMCNC: 30.7 G/DL (ref 33–36)
MCV RBC AUTO: 96.6 FL (ref 82–98)
MONOCYTES # BLD AUTO: 0.35 10*3/MM3 (ref 0.19–1.3)
NEUTROPHILS # BLD AUTO: 3.41 10*3/MM3 (ref 1.87–8.4)
NEUTROPHILS NFR BLD MANUAL: 77 % (ref 39–78)
PLAT MORPH BLD: NORMAL
PLATELET # BLD AUTO: 164 10*3/MM3 (ref 130–400)
PMV BLD AUTO: 11.9 FL (ref 6–12)
POIKILOCYTOSIS BLD QL SMEAR: ABNORMAL
POTASSIUM BLD-SCNC: 2.6 MMOL/L (ref 3.5–5.3)
RBC # BLD AUTO: 2.97 10*6/MM3 (ref 4.2–5.4)
SODIUM BLD-SCNC: 140 MMOL/L (ref 135–145)
WBC MORPH BLD: NORMAL
WBC NRBC COR # BLD: 4.43 10*3/MM3 (ref 4.8–10.8)

## 2017-02-23 PROCEDURE — 92526 ORAL FUNCTION THERAPY: CPT

## 2017-02-23 PROCEDURE — 80048 BASIC METABOLIC PNL TOTAL CA: CPT | Performed by: NURSE PRACTITIONER

## 2017-02-23 PROCEDURE — 25010000002 ENOXAPARIN PER 10 MG: Performed by: INTERNAL MEDICINE

## 2017-02-23 PROCEDURE — 25010000002 HYDROCORTISONE SODIUM SUCCINATE 100 MG RECONSTITUTED SOLUTION: Performed by: INTERNAL MEDICINE

## 2017-02-23 PROCEDURE — 97110 THERAPEUTIC EXERCISES: CPT

## 2017-02-23 PROCEDURE — 97530 THERAPEUTIC ACTIVITIES: CPT

## 2017-02-23 PROCEDURE — 25010000002 MORPHINE SULFATE (PF) 2 MG/ML SOLUTION: Performed by: INTERNAL MEDICINE

## 2017-02-23 PROCEDURE — 82962 GLUCOSE BLOOD TEST: CPT

## 2017-02-23 PROCEDURE — 85027 COMPLETE CBC AUTOMATED: CPT | Performed by: NURSE PRACTITIONER

## 2017-02-23 PROCEDURE — 25010000002 PIPERACILLIN SOD-TAZOBACTAM PER 1 G: Performed by: INTERNAL MEDICINE

## 2017-02-23 PROCEDURE — 85007 BL SMEAR W/DIFF WBC COUNT: CPT | Performed by: NURSE PRACTITIONER

## 2017-02-23 RX ORDER — BUMETANIDE 0.25 MG/ML
0.5 INJECTION INTRAMUSCULAR; INTRAVENOUS DAILY
Status: DISCONTINUED | OUTPATIENT
Start: 2017-02-24 | End: 2017-02-28 | Stop reason: SDUPTHER

## 2017-02-23 RX ORDER — POTASSIUM CHLORIDE 20MEQ/15ML
40 LIQUID (ML) ORAL 3 TIMES DAILY
Status: DISCONTINUED | OUTPATIENT
Start: 2017-02-23 | End: 2017-03-01

## 2017-02-24 LAB
ANION GAP SERPL CALCULATED.3IONS-SCNC: 11 MMOL/L (ref 4–13)
BUN BLD-MCNC: 22 MG/DL (ref 5–21)
BUN/CREAT SERPL: 66.7 (ref 7–25)
CALCIUM SPEC-SCNC: 9.3 MG/DL (ref 8.4–10.4)
CHLORIDE SERPL-SCNC: 99 MMOL/L (ref 98–110)
CO2 SERPL-SCNC: 33 MMOL/L (ref 24–31)
CREAT BLD-MCNC: 0.33 MG/DL (ref 0.5–1.4)
GFR SERPL CREATININE-BSD FRML MDRD: >150 ML/MIN/1.73
GLUCOSE BLD-MCNC: 121 MG/DL (ref 70–100)
MAGNESIUM SERPL-MCNC: 2.1 MG/DL (ref 1.4–2.2)
POTASSIUM BLD-SCNC: 3.6 MMOL/L (ref 3.5–5.3)
SODIUM BLD-SCNC: 143 MMOL/L (ref 135–145)

## 2017-02-24 PROCEDURE — 92526 ORAL FUNCTION THERAPY: CPT | Performed by: SPEECH-LANGUAGE PATHOLOGIST

## 2017-02-24 PROCEDURE — 25010000002 ENOXAPARIN PER 10 MG: Performed by: INTERNAL MEDICINE

## 2017-02-24 PROCEDURE — 80048 BASIC METABOLIC PNL TOTAL CA: CPT | Performed by: NURSE PRACTITIONER

## 2017-02-24 PROCEDURE — 97535 SELF CARE MNGMENT TRAINING: CPT

## 2017-02-24 PROCEDURE — 83735 ASSAY OF MAGNESIUM: CPT | Performed by: NURSE PRACTITIONER

## 2017-02-24 PROCEDURE — 97607 NEG PRS WND THR NDME<=50SQCM: CPT

## 2017-02-24 PROCEDURE — 97530 THERAPEUTIC ACTIVITIES: CPT

## 2017-02-24 PROCEDURE — 25010000002 MORPHINE SULFATE (PF) 2 MG/ML SOLUTION: Performed by: INTERNAL MEDICINE

## 2017-02-24 PROCEDURE — 25010000002 HYDROCORTISONE SODIUM SUCCINATE 100 MG RECONSTITUTED SOLUTION: Performed by: INTERNAL MEDICINE

## 2017-02-24 PROCEDURE — 25010000002 PIPERACILLIN SOD-TAZOBACTAM PER 1 G: Performed by: INTERNAL MEDICINE

## 2017-02-24 PROCEDURE — 25010000002 LORAZEPAM PER 2 MG: Performed by: INTERNAL MEDICINE

## 2017-02-25 LAB
ANION GAP SERPL CALCULATED.3IONS-SCNC: 10 MMOL/L (ref 4–13)
BUN BLD-MCNC: 27 MG/DL (ref 5–21)
BUN/CREAT SERPL: 100 (ref 7–25)
CALCIUM SPEC-SCNC: 9.3 MG/DL (ref 8.4–10.4)
CHLORIDE SERPL-SCNC: 103 MMOL/L (ref 98–110)
CO2 SERPL-SCNC: 30 MMOL/L (ref 24–31)
CREAT BLD-MCNC: 0.27 MG/DL (ref 0.5–1.4)
GFR SERPL CREATININE-BSD FRML MDRD: >150 ML/MIN/1.73
GLUCOSE BLD-MCNC: 135 MG/DL (ref 70–100)
POTASSIUM BLD-SCNC: 4.3 MMOL/L (ref 3.5–5.3)
SODIUM BLD-SCNC: 143 MMOL/L (ref 135–145)

## 2017-02-25 PROCEDURE — 25010000002 MORPHINE SULFATE (PF) 2 MG/ML SOLUTION: Performed by: INTERNAL MEDICINE

## 2017-02-25 PROCEDURE — 97530 THERAPEUTIC ACTIVITIES: CPT

## 2017-02-25 PROCEDURE — 25010000002 HYDROCORTISONE SODIUM SUCCINATE 100 MG RECONSTITUTED SOLUTION: Performed by: INTERNAL MEDICINE

## 2017-02-25 PROCEDURE — 80048 BASIC METABOLIC PNL TOTAL CA: CPT | Performed by: NURSE PRACTITIONER

## 2017-02-25 PROCEDURE — 25010000002 ENOXAPARIN PER 10 MG: Performed by: INTERNAL MEDICINE

## 2017-02-25 PROCEDURE — 25010000002 PIPERACILLIN SOD-TAZOBACTAM PER 1 G: Performed by: INTERNAL MEDICINE

## 2017-02-25 RX ORDER — ALPRAZOLAM 0.5 MG/1
0.5 TABLET ORAL 2 TIMES DAILY PRN
Status: DISPENSED | OUTPATIENT
Start: 2017-02-25 | End: 2017-02-27

## 2017-02-25 RX ORDER — MORPHINE SULFATE 2 MG/ML
2 INJECTION, SOLUTION INTRAMUSCULAR; INTRAVENOUS
Status: DISCONTINUED | OUTPATIENT
Start: 2017-02-25 | End: 2017-03-02 | Stop reason: DRUGHIGH

## 2017-02-26 LAB
ANION GAP SERPL CALCULATED.3IONS-SCNC: 9 MMOL/L (ref 4–13)
BUN BLD-MCNC: 21 MG/DL (ref 5–21)
BUN/CREAT SERPL: 67.7 (ref 7–25)
CALCIUM SPEC-SCNC: 9.8 MG/DL (ref 8.4–10.4)
CHLORIDE SERPL-SCNC: 105 MMOL/L (ref 98–110)
CO2 SERPL-SCNC: 29 MMOL/L (ref 24–31)
CREAT BLD-MCNC: 0.31 MG/DL (ref 0.5–1.4)
GFR SERPL CREATININE-BSD FRML MDRD: >150 ML/MIN/1.73
GLUCOSE BLD-MCNC: 98 MG/DL (ref 70–100)
POTASSIUM BLD-SCNC: 4.2 MMOL/L (ref 3.5–5.3)
SODIUM BLD-SCNC: 143 MMOL/L (ref 135–145)

## 2017-02-26 PROCEDURE — 80048 BASIC METABOLIC PNL TOTAL CA: CPT | Performed by: NURSE PRACTITIONER

## 2017-02-26 PROCEDURE — 25010000002 HYDROCORTISONE SODIUM SUCCINATE 100 MG RECONSTITUTED SOLUTION: Performed by: INTERNAL MEDICINE

## 2017-02-26 PROCEDURE — 25010000002 MORPHINE SULFATE (PF) 2 MG/ML SOLUTION: Performed by: INTERNAL MEDICINE

## 2017-02-26 PROCEDURE — 25010000002 HYDRALAZINE PER 20 MG: Performed by: INTERNAL MEDICINE

## 2017-02-26 PROCEDURE — 25010000002 PIPERACILLIN SOD-TAZOBACTAM PER 1 G: Performed by: INTERNAL MEDICINE

## 2017-02-26 PROCEDURE — 25010000002 ENOXAPARIN PER 10 MG: Performed by: INTERNAL MEDICINE

## 2017-02-27 LAB
ALBUMIN SERPL-MCNC: 3.2 G/DL (ref 3.5–5)
ALBUMIN/GLOB SERPL: 1.1 G/DL (ref 1.1–2.5)
ALP SERPL-CCNC: 81 U/L (ref 24–120)
ALT SERPL W P-5'-P-CCNC: 63 U/L (ref 0–54)
ANION GAP SERPL CALCULATED.3IONS-SCNC: 11 MMOL/L (ref 4–13)
AST SERPL-CCNC: 36 U/L (ref 7–45)
BASOPHILS # BLD AUTO: 0.02 10*3/MM3 (ref 0–0.2)
BASOPHILS NFR BLD AUTO: 0.4 % (ref 0–2)
BILIRUB SERPL-MCNC: 0.3 MG/DL (ref 0.1–1)
BUN BLD-MCNC: 20 MG/DL (ref 5–21)
BUN/CREAT SERPL: 64.5 (ref 7–25)
CALCIUM SPEC-SCNC: 9.6 MG/DL (ref 8.4–10.4)
CHLORIDE SERPL-SCNC: 106 MMOL/L (ref 98–110)
CO2 SERPL-SCNC: 24 MMOL/L (ref 24–31)
CREAT BLD-MCNC: 0.31 MG/DL (ref 0.5–1.4)
DEPRECATED RDW RBC AUTO: 79.4 FL (ref 40–54)
EOSINOPHIL # BLD AUTO: 0.07 10*3/MM3 (ref 0–0.7)
EOSINOPHIL NFR BLD AUTO: 1.2 % (ref 0–4)
ERYTHROCYTE [DISTWIDTH] IN BLOOD BY AUTOMATED COUNT: 22.7 % (ref 12–15)
GFR SERPL CREATININE-BSD FRML MDRD: >150 ML/MIN/1.73
GLOBULIN UR ELPH-MCNC: 3 GM/DL
GLUCOSE BLD-MCNC: 113 MG/DL (ref 70–100)
HCT VFR BLD AUTO: 30.1 % (ref 37–47)
HGB BLD-MCNC: 9.5 G/DL (ref 12–16)
IMM GRANULOCYTES # BLD: 0.11 10*3/MM3 (ref 0–0.03)
IMM GRANULOCYTES NFR BLD: 1.9 % (ref 0–5)
LYMPHOCYTES # BLD AUTO: 0.6 10*3/MM3 (ref 0.72–4.86)
LYMPHOCYTES NFR BLD AUTO: 10.6 % (ref 15–45)
MCH RBC QN AUTO: 30.5 PG (ref 28–32)
MCHC RBC AUTO-ENTMCNC: 31.6 G/DL (ref 33–36)
MCV RBC AUTO: 96.8 FL (ref 82–98)
MONOCYTES # BLD AUTO: 0.24 10*3/MM3 (ref 0.19–1.3)
MONOCYTES NFR BLD AUTO: 4.2 % (ref 4–12)
NEUTROPHILS # BLD AUTO: 4.63 10*3/MM3 (ref 1.87–8.4)
NEUTROPHILS NFR BLD AUTO: 81.7 % (ref 39–78)
NRBC BLD MANUAL-RTO: 0 /100 WBC (ref 0–0)
PLATELET # BLD AUTO: 203 10*3/MM3 (ref 130–400)
PMV BLD AUTO: 11.5 FL (ref 6–12)
POTASSIUM BLD-SCNC: 4.5 MMOL/L (ref 3.5–5.3)
PREALB SERPL-MCNC: 52.9 MG/DL (ref 18–36)
PROT SERPL-MCNC: 6.2 G/DL (ref 6.3–8.7)
RBC # BLD AUTO: 3.11 10*6/MM3 (ref 4.2–5.4)
SODIUM BLD-SCNC: 141 MMOL/L (ref 135–145)
WBC NRBC COR # BLD: 5.67 10*3/MM3 (ref 4.8–10.8)

## 2017-02-27 PROCEDURE — 85025 COMPLETE CBC W/AUTO DIFF WBC: CPT | Performed by: INTERNAL MEDICINE

## 2017-02-27 PROCEDURE — 97530 THERAPEUTIC ACTIVITIES: CPT

## 2017-02-27 PROCEDURE — 25010000002 ENOXAPARIN PER 10 MG: Performed by: INTERNAL MEDICINE

## 2017-02-27 PROCEDURE — 25010000002 HYDROCORTISONE SODIUM SUCCINATE 100 MG RECONSTITUTED SOLUTION: Performed by: INTERNAL MEDICINE

## 2017-02-27 PROCEDURE — 97110 THERAPEUTIC EXERCISES: CPT

## 2017-02-27 PROCEDURE — 84134 ASSAY OF PREALBUMIN: CPT | Performed by: INTERNAL MEDICINE

## 2017-02-27 PROCEDURE — 80053 COMPREHEN METABOLIC PANEL: CPT | Performed by: INTERNAL MEDICINE

## 2017-02-27 PROCEDURE — 25010000002 PIPERACILLIN SOD-TAZOBACTAM PER 1 G: Performed by: INTERNAL MEDICINE

## 2017-02-27 PROCEDURE — 97607 NEG PRS WND THR NDME<=50SQCM: CPT

## 2017-02-27 PROCEDURE — 25010000002 MORPHINE SULFATE (PF) 2 MG/ML SOLUTION: Performed by: INTERNAL MEDICINE

## 2017-02-27 PROCEDURE — 92526 ORAL FUNCTION THERAPY: CPT

## 2017-02-27 RX ORDER — BACLOFEN 10 MG/1
20 TABLET ORAL 4 TIMES DAILY
Status: DISCONTINUED | OUTPATIENT
Start: 2017-02-27 | End: 2017-03-20 | Stop reason: HOSPADM

## 2017-02-27 RX ORDER — ALPRAZOLAM 0.5 MG/1
0.5 TABLET ORAL 2 TIMES DAILY PRN
Status: DISPENSED | OUTPATIENT
Start: 2017-02-27 | End: 2017-03-01

## 2017-02-27 RX ORDER — TIZANIDINE 4 MG/1
8 TABLET ORAL EVERY 8 HOURS PRN
Status: DISCONTINUED | OUTPATIENT
Start: 2017-02-27 | End: 2017-03-09

## 2017-02-27 RX ORDER — SACCHAROMYCES BOULARDII 250 MG
250 CAPSULE ORAL 2 TIMES DAILY
Status: DISCONTINUED | OUTPATIENT
Start: 2017-02-27 | End: 2017-03-20 | Stop reason: HOSPADM

## 2017-02-28 LAB
ANION GAP SERPL CALCULATED.3IONS-SCNC: 8 MMOL/L (ref 4–13)
BUN BLD-MCNC: 18 MG/DL (ref 5–21)
BUN/CREAT SERPL: 60 (ref 7–25)
CALCIUM SPEC-SCNC: 9.3 MG/DL (ref 8.4–10.4)
CHLORIDE SERPL-SCNC: 106 MMOL/L (ref 98–110)
CO2 SERPL-SCNC: 26 MMOL/L (ref 24–31)
CREAT BLD-MCNC: 0.3 MG/DL (ref 0.5–1.4)
GFR SERPL CREATININE-BSD FRML MDRD: >150 ML/MIN/1.73
GLUCOSE BLD-MCNC: 127 MG/DL (ref 70–100)
POTASSIUM BLD-SCNC: 4.3 MMOL/L (ref 3.5–5.3)
SODIUM BLD-SCNC: 140 MMOL/L (ref 135–145)

## 2017-02-28 PROCEDURE — 97530 THERAPEUTIC ACTIVITIES: CPT

## 2017-02-28 PROCEDURE — 97607 NEG PRS WND THR NDME<=50SQCM: CPT

## 2017-02-28 PROCEDURE — 25010000002 PIPERACILLIN SOD-TAZOBACTAM PER 1 G: Performed by: INTERNAL MEDICINE

## 2017-02-28 PROCEDURE — 25010000002 MORPHINE SULFATE (PF) 2 MG/ML SOLUTION: Performed by: INTERNAL MEDICINE

## 2017-02-28 PROCEDURE — 25010000002 HYDROCORTISONE SODIUM SUCCINATE 100 MG RECONSTITUTED SOLUTION: Performed by: INTERNAL MEDICINE

## 2017-02-28 PROCEDURE — 80048 BASIC METABOLIC PNL TOTAL CA: CPT | Performed by: NURSE PRACTITIONER

## 2017-02-28 PROCEDURE — 92526 ORAL FUNCTION THERAPY: CPT

## 2017-02-28 PROCEDURE — 97110 THERAPEUTIC EXERCISES: CPT

## 2017-02-28 PROCEDURE — 25010000002 ENOXAPARIN PER 10 MG: Performed by: INTERNAL MEDICINE

## 2017-02-28 PROCEDURE — 97535 SELF CARE MNGMENT TRAINING: CPT

## 2017-02-28 RX ORDER — BUMETANIDE 0.5 MG/1
0.5 TABLET ORAL DAILY
Status: DISCONTINUED | OUTPATIENT
Start: 2017-03-01 | End: 2017-03-14 | Stop reason: ALTCHOICE

## 2017-03-01 LAB
ANION GAP SERPL CALCULATED.3IONS-SCNC: 8 MMOL/L (ref 4–13)
ARTERIAL PATENCY WRIST A: ABNORMAL
ATMOSPHERIC PRESS: ABNORMAL MMHG
BASE EXCESS BLDA CALC-SCNC: -0.6 MMOL/L (ref -2–2)
BDY SITE: ABNORMAL
BUN BLD-MCNC: 13 MG/DL (ref 5–21)
BUN/CREAT SERPL: 46.4 (ref 7–25)
CALCIUM SPEC-SCNC: 9.5 MG/DL (ref 8.4–10.4)
CHLORIDE SERPL-SCNC: 109 MMOL/L (ref 98–110)
CO2 SERPL-SCNC: 26 MMOL/L (ref 24–31)
CREAT BLD-MCNC: 0.28 MG/DL (ref 0.5–1.4)
GFR SERPL CREATININE-BSD FRML MDRD: >150 ML/MIN/1.73
GLUCOSE BLD-MCNC: 109 MG/DL (ref 70–100)
HCO3 BLDA-SCNC: 23.5 MMOL/L (ref 22–26)
MODALITY: ABNORMAL
PCO2 BLDA: 36.9 MM HG (ref 35–45)
PH BLDA: 7.42 PH UNITS (ref 7.35–7.45)
PO2 BLDA: 76.8 MM HG (ref 80–100)
POTASSIUM BLD-SCNC: 5 MMOL/L (ref 3.5–5.3)
SAO2 % BLDCOA: 95.7 % (ref 94–100)
SAO2 % BLDCOA: 95.7 % (ref 94–100)
SODIUM BLD-SCNC: 143 MMOL/L (ref 135–145)

## 2017-03-01 PROCEDURE — 25010000002 DIAZEPAM PER 5 MG: Performed by: INTERNAL MEDICINE

## 2017-03-01 PROCEDURE — 92526 ORAL FUNCTION THERAPY: CPT

## 2017-03-01 PROCEDURE — 80048 BASIC METABOLIC PNL TOTAL CA: CPT | Performed by: NURSE PRACTITIONER

## 2017-03-01 PROCEDURE — 82803 BLOOD GASES ANY COMBINATION: CPT

## 2017-03-01 PROCEDURE — 25010000002 HYDROCORTISONE SODIUM SUCCINATE 100 MG RECONSTITUTED SOLUTION: Performed by: INTERNAL MEDICINE

## 2017-03-01 PROCEDURE — 25010000002 MORPHINE SULFATE (PF) 2 MG/ML SOLUTION: Performed by: INTERNAL MEDICINE

## 2017-03-01 PROCEDURE — 25010000002 MORPHINE (PF) 10 MG/ML SOLUTION: Performed by: INTERNAL MEDICINE

## 2017-03-01 PROCEDURE — 36600 WITHDRAWAL OF ARTERIAL BLOOD: CPT

## 2017-03-01 PROCEDURE — 25010000002 ENOXAPARIN PER 10 MG: Performed by: INTERNAL MEDICINE

## 2017-03-01 PROCEDURE — 97530 THERAPEUTIC ACTIVITIES: CPT | Performed by: OCCUPATIONAL THERAPIST

## 2017-03-01 PROCEDURE — 97530 THERAPEUTIC ACTIVITIES: CPT | Performed by: PHYSICAL THERAPIST

## 2017-03-01 RX ORDER — DIAZEPAM 5 MG/ML
5 INJECTION, SOLUTION INTRAMUSCULAR; INTRAVENOUS
Status: DISCONTINUED | OUTPATIENT
Start: 2017-03-01 | End: 2017-03-02

## 2017-03-01 RX ORDER — MORPHINE SULFATE 10 MG/ML
10 INJECTION INTRAMUSCULAR; INTRAVENOUS; SUBCUTANEOUS
Status: DISCONTINUED | OUTPATIENT
Start: 2017-03-01 | End: 2017-03-02

## 2017-03-01 RX ORDER — POTASSIUM CHLORIDE 20MEQ/15ML
40 LIQUID (ML) ORAL 2 TIMES DAILY
Status: DISCONTINUED | OUTPATIENT
Start: 2017-03-01 | End: 2017-03-11 | Stop reason: ALTCHOICE
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
## 2017-03-02 ENCOUNTER — HOSPITAL ENCOUNTER (OUTPATIENT)
Dept: PAIN MANAGEMENT | Age: 52
Discharge: HOME | End: 2017-03-02
Payer: MEDICARE

## 2017-03-02 ENCOUNTER — APPOINTMENT (OUTPATIENT)
Dept: CT IMAGING | Facility: HOSPITAL | Age: 52
End: 2017-03-02

## 2017-03-02 DIAGNOSIS — G56.23 ULNAR NEUROPATHY OF BOTH UPPER EXTREMITIES: ICD-10-CM

## 2017-03-02 LAB
ANION GAP SERPL CALCULATED.3IONS-SCNC: 10 MMOL/L (ref 4–13)
BASOPHILS # BLD AUTO: 0 10*3/MM3 (ref 0–0.2)
BASOPHILS NFR BLD AUTO: 0 % (ref 0–2)
BUN BLD-MCNC: 12 MG/DL (ref 5–21)
BUN/CREAT SERPL: 37.5 (ref 7–25)
CALCIUM SPEC-SCNC: 9.8 MG/DL (ref 8.4–10.4)
CHLORIDE SERPL-SCNC: 106 MMOL/L (ref 98–110)
CO2 SERPL-SCNC: 28 MMOL/L (ref 24–31)
CREAT BLD-MCNC: 0.32 MG/DL (ref 0.5–1.4)
CRP SERPL-MCNC: 1.73 MG/DL (ref 0–0.99)
DEPRECATED RDW RBC AUTO: 81.6 FL (ref 40–54)
EOSINOPHIL # BLD AUTO: 0.02 10*3/MM3 (ref 0–0.7)
EOSINOPHIL NFR BLD AUTO: 0.4 % (ref 0–4)
ERYTHROCYTE [DISTWIDTH] IN BLOOD BY AUTOMATED COUNT: 22.4 % (ref 12–15)
ERYTHROCYTE [SEDIMENTATION RATE] IN BLOOD: 18 MM/HR (ref 0–20)
GFR SERPL CREATININE-BSD FRML MDRD: >150 ML/MIN/1.73
GLUCOSE BLD-MCNC: 85 MG/DL (ref 70–100)
HCT VFR BLD AUTO: 34.1 % (ref 37–47)
HGB BLD-MCNC: 10.3 G/DL (ref 12–16)
IMM GRANULOCYTES # BLD: 0.08 10*3/MM3 (ref 0–0.03)
IMM GRANULOCYTES NFR BLD: 1.6 % (ref 0–5)
LYMPHOCYTES # BLD AUTO: 0.73 10*3/MM3 (ref 0.72–4.86)
LYMPHOCYTES NFR BLD AUTO: 14.7 % (ref 15–45)
MAGNESIUM SERPL-MCNC: 1.7 MG/DL (ref 1.4–2.2)
MCH RBC QN AUTO: 30 PG (ref 28–32)
MCHC RBC AUTO-ENTMCNC: 30.2 G/DL (ref 33–36)
MCV RBC AUTO: 99.4 FL (ref 82–98)
MONOCYTES # BLD AUTO: 0.25 10*3/MM3 (ref 0.19–1.3)
MONOCYTES NFR BLD AUTO: 5 % (ref 4–12)
NEUTROPHILS # BLD AUTO: 3.9 10*3/MM3 (ref 1.87–8.4)
NEUTROPHILS NFR BLD AUTO: 78.3 % (ref 39–78)
NRBC BLD MANUAL-RTO: 0 /100 WBC (ref 0–0)
PLATELET # BLD AUTO: 221 10*3/MM3 (ref 130–400)
PMV BLD AUTO: 11.7 FL (ref 6–12)
POTASSIUM BLD-SCNC: 4.2 MMOL/L (ref 3.5–5.3)
RBC # BLD AUTO: 3.43 10*6/MM3 (ref 4.2–5.4)
SODIUM BLD-SCNC: 144 MMOL/L (ref 135–145)
VIT B12 BLD-MCNC: 944 PG/ML (ref 239–931)
WBC NRBC COR # BLD: 4.98 10*3/MM3 (ref 4.8–10.8)

## 2017-03-02 PROCEDURE — 85025 COMPLETE CBC W/AUTO DIFF WBC: CPT | Performed by: INTERNAL MEDICINE

## 2017-03-02 PROCEDURE — 2500000003 HC RX 250 WO HCPCS

## 2017-03-02 PROCEDURE — 86140 C-REACTIVE PROTEIN: CPT | Performed by: INTERNAL MEDICINE

## 2017-03-02 PROCEDURE — 83735 ASSAY OF MAGNESIUM: CPT | Performed by: INTERNAL MEDICINE

## 2017-03-02 PROCEDURE — 85651 RBC SED RATE NONAUTOMATED: CPT | Performed by: INTERNAL MEDICINE

## 2017-03-02 PROCEDURE — 70450 CT HEAD/BRAIN W/O DYE: CPT

## 2017-03-02 PROCEDURE — 82607 VITAMIN B-12: CPT | Performed by: INTERNAL MEDICINE

## 2017-03-02 PROCEDURE — 80048 BASIC METABOLIC PNL TOTAL CA: CPT | Performed by: NURSE PRACTITIONER

## 2017-03-02 PROCEDURE — 25010000002 MORPHINE (PF) 10 MG/ML SOLUTION: Performed by: INTERNAL MEDICINE

## 2017-03-02 PROCEDURE — 25010000002 HYDROCORTISONE SODIUM SUCCINATE 100 MG RECONSTITUTED SOLUTION: Performed by: INTERNAL MEDICINE

## 2017-03-02 PROCEDURE — 92526 ORAL FUNCTION THERAPY: CPT

## 2017-03-02 PROCEDURE — 62369 ANAL SP INF PMP W/REPRG&FILL: CPT

## 2017-03-02 PROCEDURE — 25010000002 ENOXAPARIN PER 10 MG: Performed by: INTERNAL MEDICINE

## 2017-03-02 PROCEDURE — 25010000002 DIAZEPAM PER 5 MG: Performed by: INTERNAL MEDICINE

## 2017-03-02 PROCEDURE — 25010000002 DIAZEPAM PER 5 MG: Performed by: NURSE PRACTITIONER

## 2017-03-02 PROCEDURE — 6360000002 HC RX W HCPCS

## 2017-03-02 RX ORDER — DIAZEPAM 5 MG/ML
5 INJECTION, SOLUTION INTRAMUSCULAR; INTRAVENOUS EVERY 8 HOURS PRN
Status: DISCONTINUED | OUTPATIENT
Start: 2017-03-02 | End: 2017-03-07

## 2017-03-02 RX ORDER — MORPHINE SULFATE 4 MG/ML
4 INJECTION, SOLUTION INTRAMUSCULAR; INTRAVENOUS EVERY 4 HOURS PRN
Status: DISCONTINUED | OUTPATIENT
Start: 2017-03-02 | End: 2017-03-20 | Stop reason: HOSPADM

## 2017-03-03 LAB
ANION GAP SERPL CALCULATED.3IONS-SCNC: 9 MMOL/L (ref 4–13)
BILIRUB UR QL STRIP: NEGATIVE
BUN BLD-MCNC: 18 MG/DL (ref 5–21)
BUN/CREAT SERPL: 51.4 (ref 7–25)
CALCIUM SPEC-SCNC: 9.8 MG/DL (ref 8.4–10.4)
CHLORIDE SERPL-SCNC: 106 MMOL/L (ref 98–110)
CLARITY UR: CLEAR
CO2 SERPL-SCNC: 27 MMOL/L (ref 24–31)
COLOR UR: YELLOW
CREAT BLD-MCNC: 0.35 MG/DL (ref 0.5–1.4)
GFR SERPL CREATININE-BSD FRML MDRD: >150 ML/MIN/1.73
GLUCOSE BLD-MCNC: 88 MG/DL (ref 70–100)
GLUCOSE UR STRIP-MCNC: NEGATIVE MG/DL
HGB UR QL STRIP.AUTO: NEGATIVE
KETONES UR QL STRIP: NEGATIVE
LEUKOCYTE ESTERASE UR QL STRIP.AUTO: NEGATIVE
NITRITE UR QL STRIP: NEGATIVE
PH UR STRIP.AUTO: 6.5 [PH] (ref 5–8)
POTASSIUM BLD-SCNC: 3.9 MMOL/L (ref 3.5–5.3)
PROT UR QL STRIP: NEGATIVE
SODIUM BLD-SCNC: 142 MMOL/L (ref 135–145)
SP GR UR STRIP: 1.01 (ref 1–1.03)
UROBILINOGEN UR QL STRIP: NORMAL

## 2017-03-03 PROCEDURE — 25010000002 ENOXAPARIN PER 10 MG: Performed by: INTERNAL MEDICINE

## 2017-03-03 PROCEDURE — 80048 BASIC METABOLIC PNL TOTAL CA: CPT | Performed by: NURSE PRACTITIONER

## 2017-03-03 PROCEDURE — 81003 URINALYSIS AUTO W/O SCOPE: CPT | Performed by: INTERNAL MEDICINE

## 2017-03-03 PROCEDURE — 25010000002 HYDROCORTISONE SODIUM SUCCINATE 100 MG RECONSTITUTED SOLUTION: Performed by: INTERNAL MEDICINE

## 2017-03-03 PROCEDURE — 87086 URINE CULTURE/COLONY COUNT: CPT | Performed by: INTERNAL MEDICINE

## 2017-03-03 PROCEDURE — 92526 ORAL FUNCTION THERAPY: CPT

## 2017-03-03 PROCEDURE — 97607 NEG PRS WND THR NDME<=50SQCM: CPT | Performed by: PHYSICAL THERAPIST

## 2017-03-03 RX ORDER — SODIUM CHLORIDE 9 MG/ML
50 INJECTION, SOLUTION INTRAVENOUS CONTINUOUS
Status: DISCONTINUED | OUTPATIENT
Start: 2017-03-03 | End: 2017-03-06 | Stop reason: SDUPTHER

## 2017-03-04 LAB
ANION GAP SERPL CALCULATED.3IONS-SCNC: 8 MMOL/L (ref 4–13)
BUN BLD-MCNC: 16 MG/DL (ref 5–21)
BUN/CREAT SERPL: 55.2 (ref 7–25)
CALCIUM SPEC-SCNC: 9.2 MG/DL (ref 8.4–10.4)
CHLORIDE SERPL-SCNC: 111 MMOL/L (ref 98–110)
CO2 SERPL-SCNC: 25 MMOL/L (ref 24–31)
CREAT BLD-MCNC: 0.29 MG/DL (ref 0.5–1.4)
GFR SERPL CREATININE-BSD FRML MDRD: >150 ML/MIN/1.73
GLUCOSE BLD-MCNC: 92 MG/DL (ref 70–100)
POTASSIUM BLD-SCNC: 3.1 MMOL/L (ref 3.5–5.3)
SODIUM BLD-SCNC: 144 MMOL/L (ref 135–145)

## 2017-03-04 PROCEDURE — 25010000002 MORPHINE PER 10 MG: Performed by: INTERNAL MEDICINE

## 2017-03-04 PROCEDURE — 80048 BASIC METABOLIC PNL TOTAL CA: CPT | Performed by: NURSE PRACTITIONER

## 2017-03-04 PROCEDURE — 25010000002 HYDROCORTISONE SODIUM SUCCINATE 100 MG RECONSTITUTED SOLUTION: Performed by: NURSE PRACTITIONER

## 2017-03-04 PROCEDURE — 25010000002 ENOXAPARIN PER 10 MG: Performed by: INTERNAL MEDICINE

## 2017-03-04 PROCEDURE — 25010000002 HYDROCORTISONE SODIUM SUCCINATE 100 MG RECONSTITUTED SOLUTION: Performed by: INTERNAL MEDICINE

## 2017-03-04 RX ORDER — POTASSIUM CHLORIDE 20MEQ/15ML
40 LIQUID (ML) ORAL ONCE
Status: DISCONTINUED | OUTPATIENT
Start: 2017-03-04 | End: 2017-03-07

## 2017-03-05 LAB
AMMONIA BLD-SCNC: <9 UMOL/L (ref 9–33)
ANION GAP SERPL CALCULATED.3IONS-SCNC: 7 MMOL/L (ref 4–13)
BACTERIA SPEC AEROBE CULT: NORMAL
BUN BLD-MCNC: 11 MG/DL (ref 5–21)
BUN/CREAT SERPL: 39.3 (ref 7–25)
CALCIUM SPEC-SCNC: 9.2 MG/DL (ref 8.4–10.4)
CHLORIDE SERPL-SCNC: 114 MMOL/L (ref 98–110)
CO2 SERPL-SCNC: 24 MMOL/L (ref 24–31)
CREAT BLD-MCNC: 0.28 MG/DL (ref 0.5–1.4)
GFR SERPL CREATININE-BSD FRML MDRD: >150 ML/MIN/1.73
GLUCOSE BLD-MCNC: 83 MG/DL (ref 70–100)
POTASSIUM BLD-SCNC: 4.1 MMOL/L (ref 3.5–5.3)
SODIUM BLD-SCNC: 145 MMOL/L (ref 135–145)

## 2017-03-05 PROCEDURE — 80048 BASIC METABOLIC PNL TOTAL CA: CPT | Performed by: NURSE PRACTITIONER

## 2017-03-05 PROCEDURE — 25010000002 HYDROCORTISONE SODIUM SUCCINATE 100 MG RECONSTITUTED SOLUTION: Performed by: NURSE PRACTITIONER

## 2017-03-05 PROCEDURE — 82140 ASSAY OF AMMONIA: CPT | Performed by: INTERNAL MEDICINE

## 2017-03-06 LAB
ANION GAP SERPL CALCULATED.3IONS-SCNC: 11 MMOL/L (ref 4–13)
BASOPHILS # BLD AUTO: 0.01 10*3/MM3 (ref 0–0.2)
BASOPHILS NFR BLD AUTO: 0.1 % (ref 0–2)
BUN BLD-MCNC: 9 MG/DL (ref 5–21)
BUN/CREAT SERPL: 32.1 (ref 7–25)
CALCIUM SPEC-SCNC: 9.4 MG/DL (ref 8.4–10.4)
CHLORIDE SERPL-SCNC: 105 MMOL/L (ref 98–110)
CO2 SERPL-SCNC: 25 MMOL/L (ref 24–31)
CREAT BLD-MCNC: 0.28 MG/DL (ref 0.5–1.4)
CRP SERPL-MCNC: 21.99 MG/DL (ref 0–0.99)
DEPRECATED RDW RBC AUTO: 71.4 FL (ref 40–54)
EOSINOPHIL # BLD AUTO: 0.05 10*3/MM3 (ref 0–0.7)
EOSINOPHIL NFR BLD AUTO: 0.5 % (ref 0–4)
ERYTHROCYTE [DISTWIDTH] IN BLOOD BY AUTOMATED COUNT: 20.2 % (ref 12–15)
ERYTHROCYTE [SEDIMENTATION RATE] IN BLOOD: 55 MM/HR (ref 0–20)
GFR SERPL CREATININE-BSD FRML MDRD: >150 ML/MIN/1.73
GLUCOSE BLD-MCNC: 84 MG/DL (ref 70–100)
HCT VFR BLD AUTO: 34.7 % (ref 37–47)
HGB BLD-MCNC: 10.9 G/DL (ref 12–16)
IMM GRANULOCYTES # BLD: 0.1 10*3/MM3 (ref 0–0.03)
IMM GRANULOCYTES NFR BLD: 1 % (ref 0–5)
LYMPHOCYTES # BLD AUTO: 1.08 10*3/MM3 (ref 0.72–4.86)
LYMPHOCYTES NFR BLD AUTO: 11 % (ref 15–45)
MCH RBC QN AUTO: 30.1 PG (ref 28–32)
MCHC RBC AUTO-ENTMCNC: 31.4 G/DL (ref 33–36)
MCV RBC AUTO: 95.9 FL (ref 82–98)
MONOCYTES # BLD AUTO: 0.44 10*3/MM3 (ref 0.19–1.3)
MONOCYTES NFR BLD AUTO: 4.5 % (ref 4–12)
NEUTROPHILS # BLD AUTO: 8.11 10*3/MM3 (ref 1.87–8.4)
NEUTROPHILS NFR BLD AUTO: 82.9 % (ref 39–78)
PLATELET # BLD AUTO: 180 10*3/MM3 (ref 130–400)
PMV BLD AUTO: 11.4 FL (ref 6–12)
POTASSIUM BLD-SCNC: 2.7 MMOL/L (ref 3.5–5.3)
RBC # BLD AUTO: 3.62 10*6/MM3 (ref 4.2–5.4)
SODIUM BLD-SCNC: 141 MMOL/L (ref 135–145)
WBC NRBC COR # BLD: 9.79 10*3/MM3 (ref 4.8–10.8)

## 2017-03-06 PROCEDURE — 25010000002 ENOXAPARIN PER 10 MG: Performed by: INTERNAL MEDICINE

## 2017-03-06 PROCEDURE — 85025 COMPLETE CBC W/AUTO DIFF WBC: CPT | Performed by: INTERNAL MEDICINE

## 2017-03-06 PROCEDURE — 25010000002 HYDROCORTISONE SODIUM SUCCINATE 100 MG RECONSTITUTED SOLUTION: Performed by: NURSE PRACTITIONER

## 2017-03-06 PROCEDURE — 97608 NEG PRS WND THER NDME>50SQCM: CPT

## 2017-03-06 PROCEDURE — 85651 RBC SED RATE NONAUTOMATED: CPT | Performed by: INTERNAL MEDICINE

## 2017-03-06 PROCEDURE — 25010000002 DIAZEPAM PER 5 MG: Performed by: NURSE PRACTITIONER

## 2017-03-06 PROCEDURE — 80048 BASIC METABOLIC PNL TOTAL CA: CPT | Performed by: NURSE PRACTITIONER

## 2017-03-06 PROCEDURE — 86140 C-REACTIVE PROTEIN: CPT | Performed by: INTERNAL MEDICINE

## 2017-03-06 PROCEDURE — 92526 ORAL FUNCTION THERAPY: CPT

## 2017-03-06 RX ORDER — SODIUM CHLORIDE 0.9 % (FLUSH) 0.9 %
10 SYRINGE (ML) INJECTION EVERY 12 HOURS SCHEDULED
Status: DISCONTINUED | OUTPATIENT
Start: 2017-03-06 | End: 2017-03-20 | Stop reason: HOSPADM

## 2017-03-06 RX ORDER — SODIUM CHLORIDE 0.9 % (FLUSH) 0.9 %
10 SYRINGE (ML) INJECTION AS NEEDED
Status: DISCONTINUED | OUTPATIENT
Start: 2017-03-06 | End: 2017-03-20 | Stop reason: HOSPADM

## 2017-03-06 RX ORDER — IPRATROPIUM BROMIDE AND ALBUTEROL SULFATE 2.5; .5 MG/3ML; MG/3ML
3 SOLUTION RESPIRATORY (INHALATION) EVERY 6 HOURS PRN
Status: DISCONTINUED | OUTPATIENT
Start: 2017-03-06 | End: 2017-03-20 | Stop reason: HOSPADM

## 2017-03-07 LAB
ANION GAP SERPL CALCULATED.3IONS-SCNC: 11 MMOL/L (ref 4–13)
BUN BLD-MCNC: 11 MG/DL (ref 5–21)
BUN/CREAT SERPL: 44 (ref 7–25)
CALCIUM SPEC-SCNC: 9.1 MG/DL (ref 8.4–10.4)
CHLORIDE SERPL-SCNC: 106 MMOL/L (ref 98–110)
CO2 SERPL-SCNC: 24 MMOL/L (ref 24–31)
CREAT BLD-MCNC: 0.25 MG/DL (ref 0.5–1.4)
GFR SERPL CREATININE-BSD FRML MDRD: >150 ML/MIN/1.73
GLUCOSE BLD-MCNC: 104 MG/DL (ref 70–100)
POTASSIUM BLD-SCNC: 3.3 MMOL/L (ref 3.5–5.3)
SODIUM BLD-SCNC: 141 MMOL/L (ref 135–145)

## 2017-03-07 PROCEDURE — 97535 SELF CARE MNGMENT TRAINING: CPT | Performed by: OCCUPATIONAL THERAPIST

## 2017-03-07 PROCEDURE — 97530 THERAPEUTIC ACTIVITIES: CPT

## 2017-03-07 PROCEDURE — 25010000002 ENOXAPARIN PER 10 MG: Performed by: INTERNAL MEDICINE

## 2017-03-07 PROCEDURE — 97530 THERAPEUTIC ACTIVITIES: CPT | Performed by: OCCUPATIONAL THERAPIST

## 2017-03-07 PROCEDURE — 92526 ORAL FUNCTION THERAPY: CPT

## 2017-03-07 PROCEDURE — 25010000002 MORPHINE PER 10 MG: Performed by: INTERNAL MEDICINE

## 2017-03-07 PROCEDURE — 80048 BASIC METABOLIC PNL TOTAL CA: CPT | Performed by: NURSE PRACTITIONER

## 2017-03-07 PROCEDURE — 97110 THERAPEUTIC EXERCISES: CPT

## 2017-03-07 PROCEDURE — 25010000002 HYDROCORTISONE SODIUM SUCCINATE 100 MG RECONSTITUTED SOLUTION: Performed by: NURSE PRACTITIONER

## 2017-03-07 PROCEDURE — 25010000002 DIAZEPAM PER 5 MG: Performed by: NURSE PRACTITIONER

## 2017-03-07 RX ORDER — ALPRAZOLAM 0.5 MG/1
0.5 TABLET ORAL EVERY 12 HOURS SCHEDULED
Status: DISPENSED | OUTPATIENT
Start: 2017-03-07 | End: 2017-03-17

## 2017-03-07 RX ORDER — POTASSIUM CHLORIDE 750 MG/1
40 CAPSULE, EXTENDED RELEASE ORAL ONCE
Status: DISCONTINUED | OUTPATIENT
Start: 2017-03-07 | End: 2017-03-10

## 2017-03-08 ENCOUNTER — APPOINTMENT (OUTPATIENT)
Dept: GENERAL RADIOLOGY | Facility: HOSPITAL | Age: 52
End: 2017-03-08

## 2017-03-08 LAB
ANION GAP SERPL CALCULATED.3IONS-SCNC: 12 MMOL/L (ref 4–13)
BUN BLD-MCNC: 14 MG/DL (ref 5–21)
BUN/CREAT SERPL: 46.7 (ref 7–25)
CALCIUM SPEC-SCNC: 9.7 MG/DL (ref 8.4–10.4)
CHLORIDE SERPL-SCNC: 114 MMOL/L (ref 98–110)
CO2 SERPL-SCNC: 22 MMOL/L (ref 24–31)
CREAT BLD-MCNC: 0.3 MG/DL (ref 0.5–1.4)
GFR SERPL CREATININE-BSD FRML MDRD: >150 ML/MIN/1.73
GLUCOSE BLD-MCNC: 182 MG/DL (ref 70–100)
MAGNESIUM SERPL-MCNC: 1.7 MG/DL (ref 1.4–2.2)
POTASSIUM BLD-SCNC: 4.5 MMOL/L (ref 3.5–5.3)
SODIUM BLD-SCNC: 148 MMOL/L (ref 135–145)

## 2017-03-08 PROCEDURE — 83735 ASSAY OF MAGNESIUM: CPT | Performed by: NURSE PRACTITIONER

## 2017-03-08 PROCEDURE — 25010000002 MORPHINE PER 10 MG: Performed by: INTERNAL MEDICINE

## 2017-03-08 PROCEDURE — G8996 SWALLOW CURRENT STATUS: HCPCS

## 2017-03-08 PROCEDURE — 63710000001 PREDNISONE PER 5 MG: Performed by: NURSE PRACTITIONER

## 2017-03-08 PROCEDURE — 74230 X-RAY XM SWLNG FUNCJ C+: CPT

## 2017-03-08 PROCEDURE — 92611 MOTION FLUOROSCOPY/SWALLOW: CPT

## 2017-03-08 PROCEDURE — G8998 SWALLOW D/C STATUS: HCPCS

## 2017-03-08 PROCEDURE — 97608 NEG PRS WND THER NDME>50SQCM: CPT

## 2017-03-08 PROCEDURE — 25010000002 HYDROCORTISONE SODIUM SUCCINATE 100 MG RECONSTITUTED SOLUTION: Performed by: NURSE PRACTITIONER

## 2017-03-08 PROCEDURE — 25010000002 ENOXAPARIN PER 10 MG: Performed by: INTERNAL MEDICINE

## 2017-03-08 PROCEDURE — 80048 BASIC METABOLIC PNL TOTAL CA: CPT | Performed by: NURSE PRACTITIONER

## 2017-03-08 PROCEDURE — G8997 SWALLOW GOAL STATUS: HCPCS

## 2017-03-08 RX ORDER — PREDNISONE 10 MG/1
10 TABLET ORAL 2 TIMES DAILY WITH MEALS
Status: DISCONTINUED | OUTPATIENT
Start: 2017-03-08 | End: 2017-03-14

## 2017-03-08 NOTE — THERAPY DISCHARGE NOTE
Outpatient Speech Language Pathology   Adult Swallow Initial Eval/Discharge  [facility]     Patient Name: Kathie Lynn  : 1965  MRN: [id number]  Today's Date: 3/8/2017         Visit Date: 2017   There is no problem list on file for this patient.    SPEECH-LANGUAGE PATHOLOGY EVALUTION - VFSS  Subjective: The patient was seen on this date for a VFSS(Videofluoroscopic Swallowing Study).  Patient was alert and cooperative.    The patient's history is significant for trach s/p removal.   Objective: Risks/benefits were reviewed with the patient, and consent was obtained. The study was completed with SLP and Radiologist present. The patient was seen in lateral view(s). Textures given included thin liquid, nectar thick liquid and mechanical soft consistency.  Assessment: Difficulties were noted with thin liquid, nectar thick liquid and mechanical soft consistency, characterized by decreased epiglottic inversion and a delayed swallow. She had severe pooling in the pharyngeal area after the swallow with thin. She had silent penetration after the swallow with thin. During the mechanical soft trial the thin residue in the laryngeal vestibule fell to the vocal folds and possibly below during the swallow of the mech soft solid. Cued pt to use chin tuck at end of eval to help clear mod to severe mech soft residue in the vallecula, but the pt was unable to tuck her chin.     Silent Thin [place] Honey Puree [place] Mashed Mech Soft Regular Liquid Wash   Penetration   X          Aspiration     X          Residue  X X    X       SLP Findings: Patient presents with moderate oropharyngeal dysphagia.   Comments:   Recommendations: Diet Textures: nectar thick liquid, mechanical soft consistency food. Medications should be taken whole with puree. May have water and Ice between meals after oral care, under staff or family supervision and with the recommended strategies for safe swallowing.  Recommended Strategies: Upright for PO  and small bites and sips. Oral care before breakfast, after all meals and PRN.  Other Recommended Evaluations: none    Dysphagia therapy is recommended.   Xochitl Friend, CCC-SLP 3/8/2017 4:14 PM      Past Medical History   Diagnosis Date   • Calculus of bladder    • Calculus of kidney    • Dysuria    • Hypertension    • Neurogenic bladder    • Paraplegia    • UTI (urinary tract infection)         Past Surgical History   Procedure Laterality Date   • Skin graft     • Bladder augmentation     • Coccyx fracture surgery     •  section     • Neck surgery           Visit Dx:     ICD-10-CM ICD-9-CM   1. Oropharyngeal dysphagia R13.12 787.22   2. Decreased activities of daily living (ADL) Z78.9 V49.89                   Adult Dysphagia - 17 1515     Adult Dysphagia Background    History Pertinent to Diagnosis s/p trach removal, currently on mechanical soft solids and nectar thick liquids  -MB    Current Diet (Solids) Mechanical Soft  -MB    Diet Level (Liquids) Nectar Thick Liquid  -MB    Oral Mech    Position During Evaluation Upright  -MB    Anatomy/Physiology WNL Patient demonstrates anatomy and physiology that is WNL  -MB    Dentition Patient has own teeth  -MB    Oral Health Oral cavity is clean  -MB    Awareness/Control of Secretions Patient swallows saliva  -MB    Foods/Liquids Presented    Method of Administration Spoon;Straw  -MB    Spoon    Consistency Soft Solid  -MB    Response No Signs/Symptoms  -MB    Strategies Attempted None  -MB    Straw    Consistency Thin;[place]  -MB    Response No Signs/Symptoms  -MB    Strategies Attempted Chin Tuck  -MB    Response to Strategies Unsuccessful  -MB    Oral Phase Impaired Physiology Observed    Pharyngeal Symptoms X  -MB    Summary Comments of Clinical Exam Mechanical soft, nectar, and thin consistencies presented. Pt had a delayed swallow and decreased epiglottic inversion. This resulted in severe pooling in the pharyngeal area after the swallow with  thin. She then had silent penetration of thin after the swallow. During the [facility] soft trial the thin residue in the laryngeal vestibule fell to the vocal folds and possibly below during the swallow.   -MB    VFSS Exam    Study Completed By SLP and Radiologist (comment)  -MB    Textures Presented Thin;Nectar;Solid  -MB    Position During Study/Views Obtained Upright;Lateral View  -MB    Physiologic Impairments Noted on VFSS    Physiologic Impairments Noted on VFSS X  -MB    Reduced Laryngeal Elevation all  -MB    Symptoms    Aspiration X  -MB    Aspiration After With these consistencies (comment)   thin  -MB    Residue Noted X  -MB    Valleculae With these consistencies (comment)   all  -MB    Pharyngeal Walls With these consistencies (comment)   all  -MB    Pyriform Sinuses With these consistencies (comment)   all  -MB    Compensatory Strategies Patient unable to perform any  -MB    Compensatory Strategies Used Cued for chin tuck, but pt was unable to complete  -MB    Results/Recs/Barriers/Education for Dysphagia    Factors Affecting Performance no difficulty participating in study  -MB    Learning Motivation moderate  -MB    Clinical Impression: Swallowing Moderate:;oropharyngeal phase dysphagia  -MB    Impact on Function risk for aspiration  -MB    Recommendations for Diet/Nutirition full oral diet  -MB    Swallowing Precautions upright position for at least 30 minutes after meals  -MB    Recommendations continue therapy to address swallowing deficits  -MB      User Key  (r) = Recorded By, (t) = Taken By, (c) = Cosigned By    Initials Name Provider Type    MARLINE Friend CCC-SLP Speech and Language Pathologist                            OP SLP Education       03/08/17 2902    Education    Barriers to Learning Decreased comprehension  -MB    Education Provided Described results of evaluation;Patient expressed understanding of evaluation  -MB    Assessed Learning needs;Learning motivation;Learning  preferences;Learning readiness  -MB    Learning Motivation Moderate  -MB    Learning Method Explanation  -MB    Teaching Response Verbalized understanding  -MB    Education Comments , Karen Gonzalez completed education  -MB      User Key  (r) = Recorded By, (t) = Taken By, (c) = Cosigned By    Initials Name Effective Dates    KHADRA BallSLP 08/02/16 -                 SLP OP Goals       03/07/17 1634       Time Calculation    PT Goal Re-Cert Due Date 03/17/17  -AE       User Key  (r) = Recorded By, (t) = Taken By, (c) = Cosigned By    Initials Name Provider Type    AE Lisa Stevenson, PTA Physical Therapy Assistant                OP SLP Assessment/Plan - 03/08/17 1515     SLP Assessment    Clinical Impression: Swallowing Moderate:;oropharyngeal phase dysphagia  -MB      User Key  (r) = Recorded By, (t) = Taken By, (c) = Cosigned By    Initials Name Provider Type    MARLINE Friend CCC-SLP Speech and Language Pathologist              SLP Outcome Measures (last 72 hours)      SLP Outcome Measures       03/08/17 1600          SLP Outcome Measures    Outcome Measure Used? Adult NOMS  -MB      FCM Scores    FCM Chosen Swallowing  -MB      Swallowing FCM Score 4  -MB        User Key  (r) = Recorded By, (t) = Taken By, (c) = Cosigned By    Initials Name Effective Dates    MARLINE Friend CCC-THAI 08/02/16 -             Time Calculation:   SLP Start Time: 1515  SLP Stop Time: 1613  SLP Time Calculation (min): 58 min  SLP Non-Billable Time (min): 10 min    Therapy Charges for Today     Code Description Service Date Service Provider Modifiers Qty    95173846742 HC ST SWALLOWING CURRENT STATUS 3/8/2017 KHADRA BartlettSLP GN, CK 1    56875858303 HC ST SWALLOWING PROJECTED 3/8/2017 EUGENE Bartlett, CK 1    85429062278 HC ST SWALLOWING DISCHARGE 3/8/2017 EUGENE Bartlett, CK 1    31307092159 HC ST MOTION FLUORO EVAL SWALLOW 4 3/8/2017 Xochitl Friend  CCC-SLP GN 1          SLP G-Codes  SLP NOMS Used?: Yes  Functional Limitations: Swallowing  Swallow Current Status (): At least 40 percent but less than 60 percent impaired, limited or restricted  Swallow Goal Status (): At least 40 percent but less than 60 percent impaired, limited or restricted  Swallow Discharge Status (): At least 40 percent but less than 60 percent impaired, limited or restricted           Xochitl Friend, REMEDIOS-SLP  3/8/2017

## 2017-03-09 LAB
ANION GAP SERPL CALCULATED.3IONS-SCNC: 9 MMOL/L (ref 4–13)
BUN BLD-MCNC: 13 MG/DL (ref 5–21)
BUN/CREAT SERPL: 37.1 (ref 7–25)
CALCIUM SPEC-SCNC: 9.2 MG/DL (ref 8.4–10.4)
CHLORIDE SERPL-SCNC: 106 MMOL/L (ref 98–110)
CO2 SERPL-SCNC: 26 MMOL/L (ref 24–31)
CREAT BLD-MCNC: 0.35 MG/DL (ref 0.5–1.4)
DEPRECATED RDW RBC AUTO: 67.6 FL (ref 40–54)
ERYTHROCYTE [DISTWIDTH] IN BLOOD BY AUTOMATED COUNT: 19.1 % (ref 12–15)
GFR SERPL CREATININE-BSD FRML MDRD: >150 ML/MIN/1.73
GLUCOSE BLD-MCNC: 85 MG/DL (ref 70–100)
HCT VFR BLD AUTO: 31.4 % (ref 37–47)
HGB BLD-MCNC: 9.8 G/DL (ref 12–16)
MCH RBC QN AUTO: 30.2 PG (ref 28–32)
MCHC RBC AUTO-ENTMCNC: 31.2 G/DL (ref 33–36)
MCV RBC AUTO: 96.6 FL (ref 82–98)
PLATELET # BLD AUTO: 146 10*3/MM3 (ref 130–400)
PMV BLD AUTO: 12 FL (ref 6–12)
POTASSIUM BLD-SCNC: 5.5 MMOL/L (ref 3.5–5.3)
RBC # BLD AUTO: 3.25 10*6/MM3 (ref 4.2–5.4)
SODIUM BLD-SCNC: 141 MMOL/L (ref 135–145)
WBC NRBC COR # BLD: 5.54 10*3/MM3 (ref 4.8–10.8)

## 2017-03-09 PROCEDURE — 97607 NEG PRS WND THR NDME<=50SQCM: CPT

## 2017-03-09 PROCEDURE — 63710000001 PREDNISONE PER 5 MG: Performed by: NURSE PRACTITIONER

## 2017-03-09 PROCEDURE — 25010000002 MORPHINE PER 10 MG: Performed by: INTERNAL MEDICINE

## 2017-03-09 PROCEDURE — 85027 COMPLETE CBC AUTOMATED: CPT | Performed by: NURSE PRACTITIONER

## 2017-03-09 PROCEDURE — 92526 ORAL FUNCTION THERAPY: CPT

## 2017-03-09 PROCEDURE — 25010000002 ENOXAPARIN PER 10 MG: Performed by: INTERNAL MEDICINE

## 2017-03-09 PROCEDURE — 80048 BASIC METABOLIC PNL TOTAL CA: CPT | Performed by: NURSE PRACTITIONER

## 2017-03-09 RX ORDER — TIZANIDINE 4 MG/1
8 TABLET ORAL 3 TIMES DAILY PRN
Status: DISCONTINUED | OUTPATIENT
Start: 2017-03-09 | End: 2017-03-20 | Stop reason: HOSPADM

## 2017-03-10 ENCOUNTER — APPOINTMENT (OUTPATIENT)
Dept: GENERAL RADIOLOGY | Facility: HOSPITAL | Age: 52
End: 2017-03-10

## 2017-03-10 LAB
ANION GAP SERPL CALCULATED.3IONS-SCNC: 9 MMOL/L (ref 4–13)
BUN BLD-MCNC: 13 MG/DL (ref 5–21)
BUN/CREAT SERPL: 26 (ref 7–25)
CALCIUM SPEC-SCNC: 9.7 MG/DL (ref 8.4–10.4)
CHLORIDE SERPL-SCNC: 102 MMOL/L (ref 98–110)
CO2 SERPL-SCNC: 28 MMOL/L (ref 24–31)
CREAT BLD-MCNC: 0.5 MG/DL (ref 0.5–1.4)
GFR SERPL CREATININE-BSD FRML MDRD: 129 ML/MIN/1.73
GLUCOSE BLD-MCNC: 92 MG/DL (ref 70–100)
POTASSIUM BLD-SCNC: 5.5 MMOL/L (ref 3.5–5.3)
SODIUM BLD-SCNC: 139 MMOL/L (ref 135–145)

## 2017-03-10 PROCEDURE — 63710000001 PREDNISONE PER 5 MG: Performed by: NURSE PRACTITIONER

## 2017-03-10 PROCEDURE — 25010000002 ONDANSETRON PER 1 MG: Performed by: INTERNAL MEDICINE

## 2017-03-10 PROCEDURE — 80048 BASIC METABOLIC PNL TOTAL CA: CPT | Performed by: NURSE PRACTITIONER

## 2017-03-10 PROCEDURE — 92526 ORAL FUNCTION THERAPY: CPT

## 2017-03-10 PROCEDURE — 71010 HC CHEST PA OR AP: CPT

## 2017-03-10 PROCEDURE — 25010000002 ENOXAPARIN PER 10 MG: Performed by: INTERNAL MEDICINE

## 2017-03-11 LAB
ANION GAP SERPL CALCULATED.3IONS-SCNC: 5 MMOL/L (ref 4–13)
BUN BLD-MCNC: 21 MG/DL (ref 5–21)
BUN/CREAT SERPL: 48.8 (ref 7–25)
CALCIUM SPEC-SCNC: 9.3 MG/DL (ref 8.4–10.4)
CHLORIDE SERPL-SCNC: 105 MMOL/L (ref 98–110)
CO2 SERPL-SCNC: 29 MMOL/L (ref 24–31)
CREAT BLD-MCNC: 0.43 MG/DL (ref 0.5–1.4)
GFR SERPL CREATININE-BSD FRML MDRD: >150 ML/MIN/1.73
GLUCOSE BLD-MCNC: 81 MG/DL (ref 70–100)
GLUCOSE BLDC GLUCOMTR-MCNC: 173 MG/DL (ref 70–130)
POTASSIUM BLD-SCNC: 4.6 MMOL/L (ref 3.5–5.3)
POTASSIUM BLD-SCNC: 6.8 MMOL/L (ref 3.5–5.3)
SODIUM BLD-SCNC: 139 MMOL/L (ref 135–145)

## 2017-03-11 PROCEDURE — 80048 BASIC METABOLIC PNL TOTAL CA: CPT | Performed by: NURSE PRACTITIONER

## 2017-03-11 PROCEDURE — 84132 ASSAY OF SERUM POTASSIUM: CPT | Performed by: INTERNAL MEDICINE

## 2017-03-11 PROCEDURE — 25010000002 ENOXAPARIN PER 10 MG: Performed by: INTERNAL MEDICINE

## 2017-03-11 PROCEDURE — 63710000001 PREDNISONE PER 5 MG: Performed by: NURSE PRACTITIONER

## 2017-03-11 PROCEDURE — 82962 GLUCOSE BLOOD TEST: CPT

## 2017-03-11 PROCEDURE — 97607 NEG PRS WND THR NDME<=50SQCM: CPT

## 2017-03-11 RX ORDER — SODIUM POLYSTYRENE SULFONATE 15 G/60ML
30 SUSPENSION ORAL; RECTAL ONCE
Status: DISCONTINUED | OUTPATIENT
Start: 2017-03-11 | End: 2017-03-20 | Stop reason: HOSPADM

## 2017-03-11 RX ORDER — SODIUM CHLORIDE 9 MG/ML
75 INJECTION, SOLUTION INTRAVENOUS CONTINUOUS
Status: DISCONTINUED | OUTPATIENT
Start: 2017-03-11 | End: 2017-03-13

## 2017-03-11 RX ORDER — SODIUM CHLORIDE 9 MG/ML
500 INJECTION, SOLUTION INTRAVENOUS ONCE
Status: DISCONTINUED | OUTPATIENT
Start: 2017-03-11 | End: 2017-03-13

## 2017-03-11 NOTE — PROGRESS NOTES
"Adult Nutrition  Assessment/PES    Patient Name:  Kathie Lynn  YOB: 1965  MRN: [id number]  Admit Date:  2/14/2017    Assessment Date:  3/11/2017        Reason for Assessment       03/11/17 1642    Reason for Assessment    Reason For Assessment/Visit follow up protocol                Anthropometrics       03/11/17 1644    Anthropometrics    Height 165.1 cm (65\")    Weight 54.3 kg (119 lb 12.8 oz)    RD Documented Current Weight  54.3 kg (119 lb 12.8 oz)    Ideal Body Weight (IBW)    Ideal Body Weight (IBW), Female 57.69    % Ideal Body Weight 94.39    Body Mass Index (BMI)    BMI (kg/m2) 19.98    BMI Grade 19.1 - 24.9 - normal            Labs/Tests/Procedures/Meds       03/11/17 1645    Labs/Tests/Procedures/Meds    Diagnostic Test/Procedure Review reviewed    Labs/Tests Review Reviewed;K+;Creat    Medication Review Reviewed, pertinent    Significant Vitals reviewed            Physical Findings       03/11/17 1646    Physical Findings/Assessment    Additional Documentation Physical Appearance (Group)    Physical Appearance    Skin other (see comments)   Vinh score=15/12              Nutrition Prescription Ordered       03/11/17 1648    Nutrition Prescription EN    Enteral Route --   no longer on TF      03/11/17 1646    Nutrition Prescription PO    Current PO Diet Soft Texture    Texture Ground    Fluid Consistency Nectar/syrup thick    Nutrition Prescription EN    Enteral Route Other (comment)            Evaluation of Received Nutrient/Fluid Intake       03/11/17 1649    Calculation Measurements    Weight Used For Calculations 54.3 kg (119 lb 11.4 oz)    Height Used for Calculations 1.651 m (5' 5\")    Evaluation of Received Nutrient/Fluid Intake    Nutrition Delivered Fluid Evaluation    Fluid Intake Evaluation    Other Fluid (mL) 440    Total Fluid Intake (mL) 440    Total Fluid Intake (mL/kg) 8.1 mL/kg    PO Evaluation    Number of Meals 8    % PO Intake 48    "           Problem/Interventions:        Problem 1       03/11/17 1650    Nutrition Diagnoses Problem 1    Problem 1 Inadequate Nutrient Intake    Macronutrient Kcal    Etiology (related to) Medical Diagnosis    Skin Pressure ulcer    Signs/Symptoms (evidenced by) PO Intake                    Intervention Goal       03/11/17 1651    Intervention Goal    General Maintain nutrition    PO Increase intake    TF/PN Inititiate TF/PN   Rec restart of TF if po intake does not improve to at least 75%    Weight Appropriate weight loss            Nutrition Intervention       03/11/17 1652    Nutrition Intervention    RD/Tech Action Follow Tx progress              Education/Evaluation       03/11/17 1652    Monitor/Evaluation    Monitor Per protocol   Continue to follow in LTACH for po intake, and need to restart TF, and also d/c needs, however no d/c needs noted at this time        Comments:      Electronically signed by:  Manju Rutherford  03/11/17 4:53 PM

## 2017-03-12 LAB
ANION GAP SERPL CALCULATED.3IONS-SCNC: 7 MMOL/L (ref 4–13)
BUN BLD-MCNC: 20 MG/DL (ref 5–21)
BUN/CREAT SERPL: 51.3 (ref 7–25)
CALCIUM SPEC-SCNC: 8.6 MG/DL (ref 8.4–10.4)
CHLORIDE SERPL-SCNC: 107 MMOL/L (ref 98–110)
CO2 SERPL-SCNC: 26 MMOL/L (ref 24–31)
CREAT BLD-MCNC: 0.39 MG/DL (ref 0.5–1.4)
GFR SERPL CREATININE-BSD FRML MDRD: >150 ML/MIN/1.73
GLUCOSE BLD-MCNC: 100 MG/DL (ref 70–100)
POTASSIUM BLD-SCNC: 3.6 MMOL/L (ref 3.5–5.3)
SODIUM BLD-SCNC: 140 MMOL/L (ref 135–145)

## 2017-03-12 PROCEDURE — 92526 ORAL FUNCTION THERAPY: CPT

## 2017-03-12 PROCEDURE — 25010000002 MORPHINE PER 10 MG: Performed by: INTERNAL MEDICINE

## 2017-03-12 PROCEDURE — 80048 BASIC METABOLIC PNL TOTAL CA: CPT | Performed by: NURSE PRACTITIONER

## 2017-03-12 PROCEDURE — 63710000001 PREDNISONE PER 5 MG: Performed by: NURSE PRACTITIONER

## 2017-03-12 PROCEDURE — 25010000002 ENOXAPARIN PER 10 MG: Performed by: INTERNAL MEDICINE

## 2017-03-12 PROCEDURE — 97607 NEG PRS WND THR NDME<=50SQCM: CPT

## 2017-03-13 LAB
ANION GAP SERPL CALCULATED.3IONS-SCNC: 8 MMOL/L (ref 4–13)
ANISOCYTOSIS BLD QL: NORMAL
BASOPHILS # BLD AUTO: 0.01 10*3/MM3 (ref 0–0.2)
BASOPHILS NFR BLD AUTO: 0.2 % (ref 0–2)
BUN BLD-MCNC: 13 MG/DL (ref 5–21)
BUN/CREAT SERPL: 31.7 (ref 7–25)
CALCIUM SPEC-SCNC: 8.9 MG/DL (ref 8.4–10.4)
CHLORIDE SERPL-SCNC: 109 MMOL/L (ref 98–110)
CO2 SERPL-SCNC: 24 MMOL/L (ref 24–31)
CREAT BLD-MCNC: 0.41 MG/DL (ref 0.5–1.4)
CRP SERPL-MCNC: 24.54 MG/DL (ref 0–0.99)
DEPRECATED RDW RBC AUTO: 59.7 FL (ref 40–54)
EOSINOPHIL # BLD AUTO: 0.07 10*3/MM3 (ref 0–0.7)
EOSINOPHIL NFR BLD AUTO: 1.1 % (ref 0–4)
ERYTHROCYTE [DISTWIDTH] IN BLOOD BY AUTOMATED COUNT: 17.7 % (ref 12–15)
ERYTHROCYTE [SEDIMENTATION RATE] IN BLOOD: 40 MM/HR (ref 0–20)
GFR SERPL CREATININE-BSD FRML MDRD: >150 ML/MIN/1.73
GLUCOSE BLD-MCNC: 99 MG/DL (ref 70–100)
HCT VFR BLD AUTO: 27.9 % (ref 37–47)
HGB BLD-MCNC: 9 G/DL (ref 12–16)
HYPOCHROMIA BLD QL: NORMAL
IMM GRANULOCYTES # BLD: 0.04 10*3/MM3 (ref 0–0.03)
IMM GRANULOCYTES NFR BLD: 0.6 % (ref 0–5)
LYMPHOCYTES # BLD AUTO: 0.82 10*3/MM3 (ref 0.72–4.86)
LYMPHOCYTES NFR BLD AUTO: 13.3 % (ref 15–45)
MCH RBC QN AUTO: 30 PG (ref 28–32)
MCHC RBC AUTO-ENTMCNC: 32.3 G/DL (ref 33–36)
MCV RBC AUTO: 93 FL (ref 82–98)
MONOCYTES # BLD AUTO: 0.21 10*3/MM3 (ref 0.19–1.3)
MONOCYTES NFR BLD AUTO: 3.4 % (ref 4–12)
NEUTROPHILS # BLD AUTO: 5.01 10*3/MM3 (ref 1.87–8.4)
NEUTROPHILS NFR BLD AUTO: 81.4 % (ref 39–78)
PLAT MORPH BLD: NORMAL
PLATELET # BLD AUTO: 152 10*3/MM3 (ref 130–400)
PMV BLD AUTO: 11.3 FL (ref 6–12)
POTASSIUM BLD-SCNC: 3.6 MMOL/L (ref 3.5–5.3)
RBC # BLD AUTO: 3 10*6/MM3 (ref 4.2–5.4)
SCHISTOCYTES BLD QL SMEAR: NORMAL
SODIUM BLD-SCNC: 141 MMOL/L (ref 135–145)
TARGETS BLD QL SMEAR: NORMAL
WBC MORPH BLD: NORMAL
WBC NRBC COR # BLD: 6.16 10*3/MM3 (ref 4.8–10.8)

## 2017-03-13 PROCEDURE — 85651 RBC SED RATE NONAUTOMATED: CPT | Performed by: INTERNAL MEDICINE

## 2017-03-13 PROCEDURE — 25010000002 MORPHINE PER 10 MG: Performed by: INTERNAL MEDICINE

## 2017-03-13 PROCEDURE — 63710000001 PREDNISONE PER 5 MG: Performed by: NURSE PRACTITIONER

## 2017-03-13 PROCEDURE — 85025 COMPLETE CBC W/AUTO DIFF WBC: CPT | Performed by: INTERNAL MEDICINE

## 2017-03-13 PROCEDURE — 85007 BL SMEAR W/DIFF WBC COUNT: CPT | Performed by: INTERNAL MEDICINE

## 2017-03-13 PROCEDURE — 25010000002 ENOXAPARIN PER 10 MG: Performed by: INTERNAL MEDICINE

## 2017-03-13 PROCEDURE — 97607 NEG PRS WND THR NDME<=50SQCM: CPT

## 2017-03-13 PROCEDURE — 86140 C-REACTIVE PROTEIN: CPT | Performed by: INTERNAL MEDICINE

## 2017-03-13 PROCEDURE — 80048 BASIC METABOLIC PNL TOTAL CA: CPT | Performed by: NURSE PRACTITIONER

## 2017-03-14 ENCOUNTER — APPOINTMENT (OUTPATIENT)
Dept: CT IMAGING | Facility: HOSPITAL | Age: 52
End: 2017-03-14

## 2017-03-14 LAB
AMMONIA BLD-SCNC: <9 UMOL/L (ref 9–33)
ANION GAP SERPL CALCULATED.3IONS-SCNC: 8 MMOL/L (ref 4–13)
ARTERIAL PATENCY WRIST A: ABNORMAL
ATMOSPHERIC PRESS: ABNORMAL MMHG
BACTERIA UR QL AUTO: ABNORMAL /HPF
BASE EXCESS BLDA CALC-SCNC: 1.3 MMOL/L (ref -2–2)
BDY SITE: ABNORMAL
BILIRUB UR QL STRIP: NEGATIVE
BUN BLD-MCNC: 12 MG/DL (ref 5–21)
BUN/CREAT SERPL: 35.3 (ref 7–25)
CALCIUM SPEC-SCNC: 9.2 MG/DL (ref 8.4–10.4)
CHLORIDE SERPL-SCNC: 111 MMOL/L (ref 98–110)
CLARITY UR: CLEAR
CO2 SERPL-SCNC: 27 MMOL/L (ref 24–31)
COLOR UR: YELLOW
CREAT BLD-MCNC: 0.34 MG/DL (ref 0.5–1.4)
GFR SERPL CREATININE-BSD FRML MDRD: >150 ML/MIN/1.73
GLUCOSE BLD-MCNC: 111 MG/DL (ref 70–100)
GLUCOSE UR STRIP-MCNC: NEGATIVE MG/DL
HCO3 BLDA-SCNC: 27.6 MMOL/L (ref 22–26)
HGB UR QL STRIP.AUTO: NEGATIVE
HYALINE CASTS UR QL AUTO: ABNORMAL /LPF
KETONES UR QL STRIP: NEGATIVE
LEUKOCYTE ESTERASE UR QL STRIP.AUTO: ABNORMAL
MODALITY: ABNORMAL
NITRITE UR QL STRIP: POSITIVE
PCO2 BLDA: 50.4 MM HG (ref 35–45)
PH BLDA: 7.36 PH UNITS (ref 7.35–7.45)
PH UR STRIP.AUTO: 7.5 [PH] (ref 5–8)
PO2 BLDA: 58.5 MM HG (ref 80–100)
POTASSIUM BLD-SCNC: 3.5 MMOL/L (ref 3.5–5.3)
PROT UR QL STRIP: NEGATIVE
RBC # UR: ABNORMAL /HPF
REF LAB TEST METHOD: ABNORMAL
SAO2 % BLDCOA: 89 % (ref 94–100)
SAO2 % BLDCOA: 89 % (ref 94–100)
SODIUM BLD-SCNC: 146 MMOL/L (ref 135–145)
SP GR UR STRIP: 1.01 (ref 1–1.03)
SQUAMOUS #/AREA URNS HPF: ABNORMAL /HPF
UROBILINOGEN UR QL STRIP: ABNORMAL
WBC CLUMPS # UR AUTO: ABNORMAL /HPF
WBC UR QL AUTO: ABNORMAL /HPF

## 2017-03-14 PROCEDURE — 92526 ORAL FUNCTION THERAPY: CPT

## 2017-03-14 PROCEDURE — 87186 SC STD MICRODIL/AGAR DIL: CPT | Performed by: INTERNAL MEDICINE

## 2017-03-14 PROCEDURE — 80157 ASSAY CARBAMAZEPINE FREE: CPT | Performed by: INTERNAL MEDICINE

## 2017-03-14 PROCEDURE — 25010000002 ENOXAPARIN PER 10 MG: Performed by: INTERNAL MEDICINE

## 2017-03-14 PROCEDURE — 36600 WITHDRAWAL OF ARTERIAL BLOOD: CPT

## 2017-03-14 PROCEDURE — 70450 CT HEAD/BRAIN W/O DYE: CPT

## 2017-03-14 PROCEDURE — 81001 URINALYSIS AUTO W/SCOPE: CPT | Performed by: INTERNAL MEDICINE

## 2017-03-14 PROCEDURE — 87086 URINE CULTURE/COLONY COUNT: CPT | Performed by: INTERNAL MEDICINE

## 2017-03-14 PROCEDURE — 80048 BASIC METABOLIC PNL TOTAL CA: CPT | Performed by: NURSE PRACTITIONER

## 2017-03-14 PROCEDURE — 87088 URINE BACTERIA CULTURE: CPT | Performed by: INTERNAL MEDICINE

## 2017-03-14 PROCEDURE — 25010000002 MORPHINE PER 10 MG: Performed by: INTERNAL MEDICINE

## 2017-03-14 PROCEDURE — 80156 ASSAY CARBAMAZEPINE TOTAL: CPT | Performed by: INTERNAL MEDICINE

## 2017-03-14 PROCEDURE — 82140 ASSAY OF AMMONIA: CPT | Performed by: INTERNAL MEDICINE

## 2017-03-14 PROCEDURE — 63710000001 PREDNISONE PER 5 MG: Performed by: NURSE PRACTITIONER

## 2017-03-14 PROCEDURE — 82803 BLOOD GASES ANY COMBINATION: CPT

## 2017-03-14 RX ORDER — PREDNISONE 10 MG/1
10 TABLET ORAL DAILY
Status: DISCONTINUED | OUTPATIENT
Start: 2017-03-15 | End: 2017-03-20 | Stop reason: HOSPADM

## 2017-03-15 LAB
ANION GAP SERPL CALCULATED.3IONS-SCNC: 8 MMOL/L (ref 4–13)
BASOPHILS # BLD AUTO: 0.01 10*3/MM3 (ref 0–0.2)
BASOPHILS NFR BLD AUTO: 0.2 % (ref 0–2)
BUN BLD-MCNC: 12 MG/DL (ref 5–21)
BUN/CREAT SERPL: 30 (ref 7–25)
CALCIUM SPEC-SCNC: 9.2 MG/DL (ref 8.4–10.4)
CARBAMAZEPINE FREE SERPL-MCNC: 1.8 UG/ML (ref 0.6–4.2)
CHLORIDE SERPL-SCNC: 108 MMOL/L (ref 98–110)
CO2 SERPL-SCNC: 29 MMOL/L (ref 24–31)
CREAT BLD-MCNC: 0.4 MG/DL (ref 0.5–1.4)
DEPRECATED RDW RBC AUTO: 64.1 FL (ref 40–54)
EOSINOPHIL # BLD AUTO: 0.03 10*3/MM3 (ref 0–0.7)
EOSINOPHIL NFR BLD AUTO: 0.7 % (ref 0–4)
ERYTHROCYTE [DISTWIDTH] IN BLOOD BY AUTOMATED COUNT: 18.4 % (ref 12–15)
GFR SERPL CREATININE-BSD FRML MDRD: >150 ML/MIN/1.73
GLUCOSE BLD-MCNC: 100 MG/DL (ref 70–100)
HCT VFR BLD AUTO: 29.6 % (ref 37–47)
HGB BLD-MCNC: 9.2 G/DL (ref 12–16)
IMM GRANULOCYTES # BLD: 0.07 10*3/MM3 (ref 0–0.03)
IMM GRANULOCYTES NFR BLD: 1.5 % (ref 0–5)
LYMPHOCYTES # BLD AUTO: 0.82 10*3/MM3 (ref 0.72–4.86)
LYMPHOCYTES NFR BLD AUTO: 18 % (ref 15–45)
MAGNESIUM SERPL-MCNC: 1.6 MG/DL (ref 1.4–2.2)
MCH RBC QN AUTO: 29.6 PG (ref 28–32)
MCHC RBC AUTO-ENTMCNC: 31.1 G/DL (ref 33–36)
MCV RBC AUTO: 95.2 FL (ref 82–98)
MONOCYTES # BLD AUTO: 0.25 10*3/MM3 (ref 0.19–1.3)
MONOCYTES NFR BLD AUTO: 5.5 % (ref 4–12)
NEUTROPHILS # BLD AUTO: 3.38 10*3/MM3 (ref 1.87–8.4)
NEUTROPHILS NFR BLD AUTO: 74.1 % (ref 39–78)
NRBC BLD MANUAL-RTO: 0.7 /100 WBC (ref 0–0)
PLATELET # BLD AUTO: 161 10*3/MM3 (ref 130–400)
PMV BLD AUTO: 11.6 FL (ref 6–12)
POTASSIUM BLD-SCNC: 3.5 MMOL/L (ref 3.5–5.3)
RBC # BLD AUTO: 3.11 10*6/MM3 (ref 4.2–5.4)
SODIUM BLD-SCNC: 145 MMOL/L (ref 135–145)
TSH SERPL DL<=0.05 MIU/L-ACNC: 3.46 MIU/ML (ref 0.47–4.68)
WBC NRBC COR # BLD: 4.56 10*3/MM3 (ref 4.8–10.8)

## 2017-03-15 PROCEDURE — 80048 BASIC METABOLIC PNL TOTAL CA: CPT | Performed by: NURSE PRACTITIONER

## 2017-03-15 PROCEDURE — 84443 ASSAY THYROID STIM HORMONE: CPT | Performed by: INTERNAL MEDICINE

## 2017-03-15 PROCEDURE — 92526 ORAL FUNCTION THERAPY: CPT

## 2017-03-15 PROCEDURE — 83735 ASSAY OF MAGNESIUM: CPT | Performed by: INTERNAL MEDICINE

## 2017-03-15 PROCEDURE — 25010000002 CEFTRIAXONE: Performed by: INTERNAL MEDICINE

## 2017-03-15 PROCEDURE — 25010000002 ENOXAPARIN PER 10 MG: Performed by: INTERNAL MEDICINE

## 2017-03-15 PROCEDURE — 25010000002 MORPHINE PER 10 MG: Performed by: INTERNAL MEDICINE

## 2017-03-15 PROCEDURE — 97607 NEG PRS WND THR NDME<=50SQCM: CPT

## 2017-03-15 PROCEDURE — 63710000001 PREDNISONE PER 5 MG: Performed by: NURSE PRACTITIONER

## 2017-03-15 PROCEDURE — 85025 COMPLETE CBC W/AUTO DIFF WBC: CPT | Performed by: INTERNAL MEDICINE

## 2017-03-15 PROCEDURE — 97597 DBRDMT OPN WND 1ST 20 CM/<: CPT

## 2017-03-16 LAB
ANION GAP SERPL CALCULATED.3IONS-SCNC: 7 MMOL/L (ref 4–13)
BACTERIA SPEC AEROBE CULT: ABNORMAL
BASOPHILS # BLD AUTO: 0.03 10*3/MM3 (ref 0–0.2)
BASOPHILS NFR BLD AUTO: 0.6 % (ref 0–2)
BUN BLD-MCNC: 13 MG/DL (ref 5–21)
BUN/CREAT SERPL: 36.1 (ref 7–25)
CALCIUM SPEC-SCNC: 9.3 MG/DL (ref 8.4–10.4)
CHLORIDE SERPL-SCNC: 106 MMOL/L (ref 98–110)
CO2 SERPL-SCNC: 31 MMOL/L (ref 24–31)
CREAT BLD-MCNC: 0.36 MG/DL (ref 0.5–1.4)
CRP SERPL-MCNC: 11.27 MG/DL (ref 0–0.99)
DEPRECATED RDW RBC AUTO: 65.3 FL (ref 40–54)
EOSINOPHIL # BLD AUTO: 0.06 10*3/MM3 (ref 0–0.7)
EOSINOPHIL NFR BLD AUTO: 1.2 % (ref 0–4)
ERYTHROCYTE [DISTWIDTH] IN BLOOD BY AUTOMATED COUNT: 18.6 % (ref 12–15)
ERYTHROCYTE [SEDIMENTATION RATE] IN BLOOD: 42 MM/HR (ref 0–20)
GFR SERPL CREATININE-BSD FRML MDRD: >150 ML/MIN/1.73
GLUCOSE BLD-MCNC: 75 MG/DL (ref 70–100)
HCT VFR BLD AUTO: 30.3 % (ref 37–47)
HGB BLD-MCNC: 9.4 G/DL (ref 12–16)
IMM GRANULOCYTES # BLD: 0.16 10*3/MM3 (ref 0–0.03)
IMM GRANULOCYTES NFR BLD: 3.1 % (ref 0–5)
LYMPHOCYTES # BLD AUTO: 1.26 10*3/MM3 (ref 0.72–4.86)
LYMPHOCYTES NFR BLD AUTO: 24.5 % (ref 15–45)
MCH RBC QN AUTO: 29.4 PG (ref 28–32)
MCHC RBC AUTO-ENTMCNC: 31 G/DL (ref 33–36)
MCV RBC AUTO: 94.7 FL (ref 82–98)
MONOCYTES # BLD AUTO: 0.29 10*3/MM3 (ref 0.19–1.3)
MONOCYTES NFR BLD AUTO: 5.6 % (ref 4–12)
NEUTROPHILS # BLD AUTO: 3.34 10*3/MM3 (ref 1.87–8.4)
NEUTROPHILS NFR BLD AUTO: 65 % (ref 39–78)
NRBC BLD MANUAL-RTO: 0.8 /100 WBC (ref 0–0)
NT-PROBNP SERPL-MCNC: 665 PG/ML (ref 0–900)
PLAT MORPH BLD: NORMAL
PLATELET # BLD AUTO: 135 10*3/MM3 (ref 130–400)
PMV BLD AUTO: 11.4 FL (ref 6–12)
POIKILOCYTOSIS BLD QL SMEAR: NORMAL
POTASSIUM BLD-SCNC: 3.6 MMOL/L (ref 3.5–5.3)
RBC # BLD AUTO: 3.2 10*6/MM3 (ref 4.2–5.4)
SODIUM BLD-SCNC: 144 MMOL/L (ref 135–145)
WBC MORPH BLD: NORMAL
WBC NRBC COR # BLD: 5.14 10*3/MM3 (ref 4.8–10.8)

## 2017-03-16 PROCEDURE — 85651 RBC SED RATE NONAUTOMATED: CPT | Performed by: INTERNAL MEDICINE

## 2017-03-16 PROCEDURE — 92526 ORAL FUNCTION THERAPY: CPT

## 2017-03-16 PROCEDURE — 97607 NEG PRS WND THR NDME<=50SQCM: CPT

## 2017-03-16 PROCEDURE — 83880 ASSAY OF NATRIURETIC PEPTIDE: CPT | Performed by: INTERNAL MEDICINE

## 2017-03-16 PROCEDURE — 80048 BASIC METABOLIC PNL TOTAL CA: CPT | Performed by: NURSE PRACTITIONER

## 2017-03-16 PROCEDURE — 25010000002 ENOXAPARIN PER 10 MG: Performed by: INTERNAL MEDICINE

## 2017-03-16 PROCEDURE — 63710000001 PREDNISONE PER 5 MG: Performed by: NURSE PRACTITIONER

## 2017-03-16 PROCEDURE — 85025 COMPLETE CBC W/AUTO DIFF WBC: CPT | Performed by: INTERNAL MEDICINE

## 2017-03-16 PROCEDURE — 25010000002 MORPHINE PER 10 MG: Performed by: INTERNAL MEDICINE

## 2017-03-16 PROCEDURE — 85007 BL SMEAR W/DIFF WBC COUNT: CPT | Performed by: INTERNAL MEDICINE

## 2017-03-16 PROCEDURE — 25010000002 CEFTRIAXONE: Performed by: INTERNAL MEDICINE

## 2017-03-16 PROCEDURE — 86140 C-REACTIVE PROTEIN: CPT | Performed by: INTERNAL MEDICINE

## 2017-03-17 LAB
ANION GAP SERPL CALCULATED.3IONS-SCNC: 4 MMOL/L (ref 4–13)
BUN BLD-MCNC: 14 MG/DL (ref 5–21)
BUN/CREAT SERPL: 31.8 (ref 7–25)
CALCIUM SPEC-SCNC: 9 MG/DL (ref 8.4–10.4)
CHLORIDE SERPL-SCNC: 101 MMOL/L (ref 98–110)
CO2 SERPL-SCNC: 34 MMOL/L (ref 24–31)
CREAT BLD-MCNC: 0.44 MG/DL (ref 0.5–1.4)
GFR SERPL CREATININE-BSD FRML MDRD: 150 ML/MIN/1.73
GLUCOSE BLD-MCNC: 107 MG/DL (ref 70–100)
POTASSIUM BLD-SCNC: 3.4 MMOL/L (ref 3.5–5.3)
SODIUM BLD-SCNC: 139 MMOL/L (ref 135–145)

## 2017-03-17 PROCEDURE — 80048 BASIC METABOLIC PNL TOTAL CA: CPT | Performed by: NURSE PRACTITIONER

## 2017-03-17 PROCEDURE — 25010000002 CEFTRIAXONE: Performed by: INTERNAL MEDICINE

## 2017-03-17 PROCEDURE — 25010000002 MORPHINE PER 10 MG: Performed by: INTERNAL MEDICINE

## 2017-03-17 PROCEDURE — 97164 PT RE-EVAL EST PLAN CARE: CPT

## 2017-03-17 PROCEDURE — 97607 NEG PRS WND THR NDME<=50SQCM: CPT

## 2017-03-17 PROCEDURE — 92526 ORAL FUNCTION THERAPY: CPT

## 2017-03-17 PROCEDURE — 25010000002 ENOXAPARIN PER 10 MG: Performed by: INTERNAL MEDICINE

## 2017-03-17 PROCEDURE — 63710000001 PREDNISONE PER 5 MG: Performed by: NURSE PRACTITIONER

## 2017-03-17 RX ORDER — ALPRAZOLAM 0.5 MG/1
0.5 TABLET ORAL EVERY 12 HOURS SCHEDULED
Status: DISCONTINUED | OUTPATIENT
Start: 2017-03-17 | End: 2017-03-20 | Stop reason: HOSPADM

## 2017-03-17 NOTE — PROGRESS NOTES
Adult Nutrition  Assessment/PES    Patient Name:  Kathie Lynn  YOB: 1965  MRN: [id number]  Admit Date:  2/14/2017    Assessment Date:  3/17/2017                            Problem/Interventions:        Problem 1       03/17/17 1552    Nutrition Diagnoses Problem 1    Problem 1 Inadequate Nutrient Intake    Macronutrient Kcal;Protein    Etiology (related to) Medical Diagnosis    Signs/Symptoms (evidenced by) EN Intake Delivery;PO Intake    Percent (%) intake recorded 17 %    Over number of meals 9                      Intervention Goal       03/17/17 1554    Intervention Goal    General Maintain nutrition    PO Increase intake    TF/PN Adjust TF/PN    Weight Appropriate weight loss            Nutrition Intervention       03/17/17 1555    Nutrition Intervention    RD/Tech Action Follow Tx progress   Wrote to restart TF--Jevity 1.5 @60 ml/hr x 12 hrs            Nutrition Prescription       03/17/17 1556    Nutrition Prescription EN    Enteral Prescription Enteral begin/change    Enteral Route PEG    Product Jevity 1.5 jennifer    Modulars Protein powder (6 gm/pkt)    Protein Powder Frequency 2 times a day    TF Delivery Method Cyclic    Cyclic TF Goal Volume (mL) 60 mL    Cyclic TF Starting Volume (mL) 720 mL    Cyclic TF Goal Rate (mL/hr) 60 mL/hr    Cyclic TF Starting Rate (mL/hr) 60 mL/hr    Cyclic TF Cycle Over (hrs)  12 hrs....6pm to 6 am    Water flush (mL)  25 mL    Water Flush Frequency Per hour    New EN Prescription Ordered? Yes            Education/Evaluation       03/17/17 1559    Monitor/Evaluation    Monitor Per protocol;I&O;PO intake;Pertinent labs;TF delivery/tolerance;Weight;Skin status   continue to follow for possible d/c needs.  Pt may require nocturnal feeds once d/c'd if unable to consume adequate po        Comments:      Electronically signed by:  Manju Rutherford  03/17/17 4:19 PM

## 2017-03-18 LAB
ANION GAP SERPL CALCULATED.3IONS-SCNC: 5 MMOL/L (ref 4–13)
BUN BLD-MCNC: 12 MG/DL (ref 5–21)
BUN/CREAT SERPL: 27.3 (ref 7–25)
CALCIUM SPEC-SCNC: 8.9 MG/DL (ref 8.4–10.4)
CHLORIDE SERPL-SCNC: 101 MMOL/L (ref 98–110)
CO2 SERPL-SCNC: 35 MMOL/L (ref 24–31)
CREAT BLD-MCNC: 0.44 MG/DL (ref 0.5–1.4)
GFR SERPL CREATININE-BSD FRML MDRD: 150 ML/MIN/1.73
GLUCOSE BLD-MCNC: 99 MG/DL (ref 70–100)
POTASSIUM BLD-SCNC: 3.5 MMOL/L (ref 3.5–5.3)
SODIUM BLD-SCNC: 141 MMOL/L (ref 135–145)

## 2017-03-18 PROCEDURE — 63710000001 PREDNISONE PER 5 MG: Performed by: NURSE PRACTITIONER

## 2017-03-18 PROCEDURE — 80048 BASIC METABOLIC PNL TOTAL CA: CPT | Performed by: NURSE PRACTITIONER

## 2017-03-18 PROCEDURE — 25010000002 ENOXAPARIN PER 10 MG: Performed by: INTERNAL MEDICINE

## 2017-03-18 PROCEDURE — 25010000002 CEFTRIAXONE: Performed by: INTERNAL MEDICINE

## 2017-03-19 LAB
ANION GAP SERPL CALCULATED.3IONS-SCNC: 8 MMOL/L (ref 4–13)
BUN BLD-MCNC: 12 MG/DL (ref 5–21)
BUN/CREAT SERPL: 35.3 (ref 7–25)
CALCIUM SPEC-SCNC: 8.8 MG/DL (ref 8.4–10.4)
CHLORIDE SERPL-SCNC: 100 MMOL/L (ref 98–110)
CO2 SERPL-SCNC: 34 MMOL/L (ref 24–31)
CREAT BLD-MCNC: 0.34 MG/DL (ref 0.5–1.4)
GFR SERPL CREATININE-BSD FRML MDRD: >150 ML/MIN/1.73
GLUCOSE BLD-MCNC: 113 MG/DL (ref 70–100)
POTASSIUM BLD-SCNC: 3.5 MMOL/L (ref 3.5–5.3)
SODIUM BLD-SCNC: 142 MMOL/L (ref 135–145)

## 2017-03-19 PROCEDURE — 80048 BASIC METABOLIC PNL TOTAL CA: CPT | Performed by: NURSE PRACTITIONER

## 2017-03-19 PROCEDURE — 25010000002 ENOXAPARIN PER 10 MG: Performed by: INTERNAL MEDICINE

## 2017-03-19 PROCEDURE — 25010000002 CEFTRIAXONE: Performed by: INTERNAL MEDICINE

## 2017-03-19 PROCEDURE — 63710000001 PREDNISONE PER 5 MG: Performed by: NURSE PRACTITIONER

## 2017-03-20 VITALS — HEIGHT: 65 IN | BODY MASS INDEX: 17.66 KG/M2 | WEIGHT: 106 LBS

## 2017-03-20 LAB
ANION GAP SERPL CALCULATED.3IONS-SCNC: 6 MMOL/L (ref 4–13)
BASOPHILS # BLD AUTO: 0.01 10*3/MM3 (ref 0–0.2)
BASOPHILS NFR BLD AUTO: 0.2 % (ref 0–2)
BUN BLD-MCNC: 9 MG/DL (ref 5–21)
BUN/CREAT SERPL: 25.7 (ref 7–25)
CALCIUM SPEC-SCNC: 8.4 MG/DL (ref 8.4–10.4)
CHLORIDE SERPL-SCNC: 104 MMOL/L (ref 98–110)
CO2 SERPL-SCNC: 33 MMOL/L (ref 24–31)
CREAT BLD-MCNC: 0.35 MG/DL (ref 0.5–1.4)
CRP SERPL-MCNC: 6.1 MG/DL (ref 0–0.99)
DEPRECATED RDW RBC AUTO: 64.4 FL (ref 40–54)
EOSINOPHIL # BLD AUTO: 0.07 10*3/MM3 (ref 0–0.7)
EOSINOPHIL NFR BLD AUTO: 1.6 % (ref 0–4)
ERYTHROCYTE [DISTWIDTH] IN BLOOD BY AUTOMATED COUNT: 18.5 % (ref 12–15)
ERYTHROCYTE [SEDIMENTATION RATE] IN BLOOD: 59 MM/HR (ref 0–20)
GFR SERPL CREATININE-BSD FRML MDRD: >150 ML/MIN/1.73
GLUCOSE BLD-MCNC: 83 MG/DL (ref 70–100)
HCT VFR BLD AUTO: 30.6 % (ref 37–47)
HGB BLD-MCNC: 9.4 G/DL (ref 12–16)
IMM GRANULOCYTES # BLD: 0.07 10*3/MM3 (ref 0–0.03)
IMM GRANULOCYTES NFR BLD: 1.6 % (ref 0–5)
LYMPHOCYTES # BLD AUTO: 0.95 10*3/MM3 (ref 0.72–4.86)
LYMPHOCYTES NFR BLD AUTO: 21.4 % (ref 15–45)
MCH RBC QN AUTO: 29.4 PG (ref 28–32)
MCHC RBC AUTO-ENTMCNC: 30.7 G/DL (ref 33–36)
MCV RBC AUTO: 95.6 FL (ref 82–98)
MONOCYTES # BLD AUTO: 0.24 10*3/MM3 (ref 0.19–1.3)
MONOCYTES NFR BLD AUTO: 5.4 % (ref 4–12)
NEUTROPHILS # BLD AUTO: 3.1 10*3/MM3 (ref 1.87–8.4)
NEUTROPHILS NFR BLD AUTO: 69.8 % (ref 39–78)
PLATELET # BLD AUTO: 151 10*3/MM3 (ref 130–400)
PMV BLD AUTO: 11.1 FL (ref 6–12)
POTASSIUM BLD-SCNC: 3.5 MMOL/L (ref 3.5–5.3)
RBC # BLD AUTO: 3.2 10*6/MM3 (ref 4.2–5.4)
SODIUM BLD-SCNC: 143 MMOL/L (ref 135–145)
WBC NRBC COR # BLD: 4.44 10*3/MM3 (ref 4.8–10.8)

## 2017-03-20 PROCEDURE — 86140 C-REACTIVE PROTEIN: CPT | Performed by: INTERNAL MEDICINE

## 2017-03-20 PROCEDURE — 80048 BASIC METABOLIC PNL TOTAL CA: CPT | Performed by: NURSE PRACTITIONER

## 2017-03-20 PROCEDURE — 85651 RBC SED RATE NONAUTOMATED: CPT | Performed by: INTERNAL MEDICINE

## 2017-03-20 PROCEDURE — 85025 COMPLETE CBC W/AUTO DIFF WBC: CPT | Performed by: INTERNAL MEDICINE

## 2017-03-20 PROCEDURE — 63710000001 PREDNISONE PER 5 MG: Performed by: NURSE PRACTITIONER

## 2017-03-20 PROCEDURE — 25010000002 ENOXAPARIN PER 10 MG: Performed by: INTERNAL MEDICINE

## 2017-03-20 PROCEDURE — 92526 ORAL FUNCTION THERAPY: CPT

## 2017-03-29 RX ORDER — OXYCODONE AND ACETAMINOPHEN 10; 325 MG/1; MG/1
TABLET ORAL
Qty: 240 TABLET | Refills: 0 | Status: SHIPPED | OUTPATIENT
Start: 2017-03-29 | End: 2017-04-20 | Stop reason: SDUPTHER

## 2017-04-20 ENCOUNTER — HOSPITAL ENCOUNTER (OUTPATIENT)
Dept: PAIN MANAGEMENT | Age: 52
Discharge: HOME | End: 2017-04-20
Payer: MEDICARE

## 2017-04-20 DIAGNOSIS — G56.23 ULNAR NEUROPATHY OF BOTH UPPER EXTREMITIES: ICD-10-CM

## 2017-04-20 PROCEDURE — 62369 ANAL SP INF PMP W/REPRG&FILL: CPT

## 2017-04-20 PROCEDURE — 2500000003 HC RX 250 WO HCPCS

## 2017-04-20 PROCEDURE — 6360000002 HC RX W HCPCS

## 2017-04-20 RX ORDER — AMITRIPTYLINE HYDROCHLORIDE 25 MG/1
25 TABLET, FILM COATED ORAL NIGHTLY
Qty: 30 TABLET | Refills: 5 | Status: SHIPPED | OUTPATIENT
Start: 2017-04-20 | End: 2017-09-19 | Stop reason: SDUPTHER

## 2017-04-20 RX ORDER — PREGABALIN 150 MG/1
150 CAPSULE ORAL 2 TIMES DAILY
Qty: 60 CAPSULE | Refills: 2 | Status: SHIPPED | OUTPATIENT
Start: 2017-04-20 | End: 2017-07-26 | Stop reason: SDUPTHER

## 2017-04-20 RX ORDER — TIZANIDINE 4 MG/1
8 TABLET ORAL 3 TIMES DAILY PRN
Qty: 180 TABLET | Refills: 2 | Status: SHIPPED | OUTPATIENT
Start: 2017-04-20 | End: 2017-07-26 | Stop reason: SDUPTHER

## 2017-04-20 RX ORDER — CARBAMAZEPINE 200 MG/1
200 TABLET ORAL 2 TIMES DAILY
Qty: 60 TABLET | Refills: 2 | Status: SHIPPED | OUTPATIENT
Start: 2017-04-20 | End: 2017-07-26 | Stop reason: SDUPTHER

## 2017-04-20 RX ORDER — OXYCODONE AND ACETAMINOPHEN 10; 325 MG/1; MG/1
TABLET ORAL
Qty: 240 TABLET | Refills: 0 | Status: SHIPPED | OUTPATIENT
Start: 2017-05-14 | End: 2017-06-07 | Stop reason: SDUPTHER

## 2017-05-22 RX ORDER — METOPROLOL SUCCINATE 25 MG/1
TABLET, EXTENDED RELEASE ORAL
Qty: 30 TABLET | Refills: 0 | Status: SHIPPED | OUTPATIENT
Start: 2017-05-22

## 2017-06-07 ENCOUNTER — HOSPITAL ENCOUNTER (OUTPATIENT)
Dept: PAIN MANAGEMENT | Age: 52
Discharge: HOME | End: 2017-06-07
Payer: MEDICARE

## 2017-06-07 DIAGNOSIS — G56.23 ULNAR NEUROPATHY OF BOTH UPPER EXTREMITIES: ICD-10-CM

## 2017-06-07 PROCEDURE — 6360000002 HC RX W HCPCS

## 2017-06-07 PROCEDURE — 2500000003 HC RX 250 WO HCPCS

## 2017-06-07 PROCEDURE — 62369 ANAL SP INF PMP W/REPRG&FILL: CPT

## 2017-06-07 RX ORDER — OXYCODONE AND ACETAMINOPHEN 10; 325 MG/1; MG/1
TABLET ORAL
Qty: 240 TABLET | Refills: 0 | Status: SHIPPED | OUTPATIENT
Start: 2017-06-12 | End: 2017-07-26 | Stop reason: SDUPTHER

## 2017-06-16 ENCOUNTER — TRANSCRIBE ORDERS (OUTPATIENT)
Dept: ADMINISTRATIVE | Facility: HOSPITAL | Age: 52
End: 2017-06-16

## 2017-06-16 DIAGNOSIS — R13.12 OROPHARYNGEAL DYSPHAGIA: Primary | ICD-10-CM

## 2017-06-22 RX ORDER — ATORVASTATIN CALCIUM 40 MG/1
40 TABLET, FILM COATED ORAL NIGHTLY
Qty: 90 TABLET | Refills: 3 | Status: SHIPPED | OUTPATIENT
Start: 2017-06-22 | End: 2017-08-14 | Stop reason: SDUPTHER

## 2017-06-23 ENCOUNTER — HOSPITAL ENCOUNTER (OUTPATIENT)
Dept: GENERAL RADIOLOGY | Facility: HOSPITAL | Age: 52
Discharge: HOME | End: 2017-06-23
Admitting: INTERNAL MEDICINE

## 2017-06-23 DIAGNOSIS — R13.12 OROPHARYNGEAL DYSPHAGIA: ICD-10-CM

## 2017-06-23 PROCEDURE — 92611 MOTION FLUOROSCOPY/SWALLOW: CPT

## 2017-06-23 PROCEDURE — G8996 SWALLOW CURRENT STATUS: HCPCS

## 2017-06-23 PROCEDURE — 74230 X-RAY XM SWLNG FUNCJ C+: CPT

## 2017-06-23 PROCEDURE — G8997 SWALLOW GOAL STATUS: HCPCS

## 2017-06-23 PROCEDURE — G8998 SWALLOW D/C STATUS: HCPCS

## 2017-06-23 NOTE — MBS/VFSS/FEES
Outpatient Speech Language Pathology   Adult Swallow Initial Evaluation  [facility]     Patient Name: Kathie Lynn  : 1965  MRN: [id number]  Today's Date: 2017         Visit Date: 2017     SPEECH-LANGUAGE PATHOLOGY EVALUTION - VFSS  Subjective: The patient was seen on this date for a VFSS(Videofluoroscopic Swallowing Study).  Patient was alert and cooperative.    The patient's history is significant for aspiration of vomit requiring mechanical ventilation, tracheostomy (which has been removed, stoma currently closed), G-tube, which the pt stated she continues to utilize at this time.   Objective: Risks/benefits were reviewed with the patient and family, and consent was obtained. The study was completed with SLP and Radiologist present. The patient was seen in lateral view(s). Textures given included thin liquid, nectar thick liquid, honey thick liquid, puree consistency, mechanical soft consistency and regular consistency.  Assessment: Mildly decreased bolus formation, slightly prolonged oral prep phase of the swallow, with occasional lingual rocking. Delayed swallow initiation with all presented consistencies, most significant with thin, with premature loss to the pyriform sinuses. Decreased laryngeal elevation, with essentially only anterior hyolaryngeal excursion. Decreased epiglottic inversion. Multiple instances of 1x spontaneous multiple swallow noted, as the pt often requested extra time to be able to complete an additional swallow prior to the presentation of an additional trial. Mildly to moderately decreased pharyngeal contraction; however, only mild residue was noted throughout the oropharyngeal area, except for mild-moderate at times in the vallecula. No definite penetration or aspiration observed throughout the study. Esophageal screen was performed.       Silent Thin Nectar Honey Puree Fork Mashed Mech Soft Regular Liquid Wash   Penetration             Aspiration                Residue  x x x x  x x      SLP Findings: Patient presents with mild to moderate oropharyngeal dysphagia.   Comments: The results of this study were discussed in full with the pt and the pt's . Though both parties appeared to have some cognitive impairment, through verbal and visual education methods, it is felt both individuals understood ST recommendations at this time. They are aware that ST continues to recommend home health ST services for dysphagia and it is recommended that the home health therapist watch the pt with a trial meal with thin liquids to determine if the pt's endurance allows her to be able to safely tolerate thin liquids. The pt and  were educated that the pt is ok for thin liquid water in between meals, being 30 minutes following any other PO intake. The pt inquired re: if ice cream was safe to be consumed at this time. She was educated that it is not recommended at this time, secondary to the increased risk for aspiration. General aspiration precautions were reviewed with pt and .   Recommendations: Diet Textures: nectar thick liquid, mechanical soft consistency with no mixed textures food. Medications should be taken whole with puree. May have water and Ice between meals after oral care, under staff or family supervision and with the recommended strategies for safe swallowing.  Recommended Strategies: Upright for PO, small bites and sips and double swallow with presented trials.. Oral care before breakfast, after all meals and PRN.    Dysphagia therapy is recommended to be continued, pt and  stated pt is currently receiving home health ST services. Rationale: See above. Thanks! Danielle Abrams, CCC-SLP 6/23/2017 5:10 PM        There is no problem list on file for this patient.       Past Medical History:   Diagnosis Date   • Calculus of bladder    • Calculus of kidney    • Dysuria    • Hypertension    • Neurogenic bladder    • Paraplegia    • UTI (urinary tract  "infection)         Past Surgical History:   Procedure Laterality Date   • BLADDER AUGMENTATION     •  SECTION     • COCCYX FRACTURE SURGERY     • NECK SURGERY     • SKIN GRAFT           Visit Dx:     ICD-10-CM ICD-9-CM   1. Oropharyngeal dysphagia R13.12 787.22                   Adult Dysphagia - 17 1503     Adult Dysphagia Background    Patient Description of Complaint Pt c/o being on a modified diet of what appeared to be pureed diet consistency with nectar thick liquids; however, this was somewhat unclear despite discussion with pt and , as each party appeared to have some level of cognitive impairment.  stated desire for eval is to determine if pt is able to upgrade. Prior hx of aspiration of thin liquids.   -TM    Symptoms Reported by Patient History of aspiration  -TM    Patient Report of Functional Impact Pt stated she so desires to be able to eat \"regular\" food again.  -TM    History Pertinent to Diagnosis Aspiration of vomit resulting in mechanical ventilation, tracheostomy.   -TM    Current Diet (Solids) Puree  -TM    Diet Level (Liquids) Nectar Thick Liquid  -TM    Non-Oral Feeding Gastrostomy Tube  -TM    Oral Mech    Respiratory/Swallow Coordination Pattern Mildly Impaired  -TM    Position During Evaluation Upright  -TM    Anatomy/Physiology WNL Patient demonstrates anatomy that is WNL  -TM    Dentition Patient has own teeth  -TM    Oral Health --   Poor linugal health, possible lingual thrush.  -TM    Awareness/Control of Secretions Patient swallows saliva  -TM    VFSS Exam    Study Completed By SLP and Radiologist (comment);Other   Dr. Banerjee  -TM    Textures Presented Thin;Nectar;Honey;Pudding;Solid;Other (comment)   Mech soft   -TM    Position During Study/Views Obtained Upright  -TM    Physiologic Impairments Noted on VFSS    Physiologic Impairments Noted on VFSS X  -TM    Oral Control/Manipulation Difficulty Some lingual pumping to assist in swallow initiation.   -TM    " Mistiming Delayed swallow initiation with all presented trials, most significant with thin with premature loss of the bolus to the pyriform sinuses.  -TM    Reduced Laryngeal Elevation Essentially only anterior hyolaryngeal excursion noted.   -TM    Reduced Pharyngeal Contraction Mildly to moderately decreased throughout eval, though generally only mild oropharyngeal residue remained post swallow.   -TM    Symptoms    Residue Noted X  -TM    Valleculae With these consistencies (comment)   With all presented consistencies, of a mild-mod extent  -TM    Pyriform Sinuses With these consistencies (comment);With this reaction (comment)   With thin liquids, 1x multiple swallow at times.  -TM    Compensatory Strategies X  -TM    Compensatory Strategies Used Pt spontaneously utilized 1x multiple swallow many times during eval, appeared to have good awareness of when pharyngeal residue remained post swallow.   -TM    Results/Recs/Barriers/Education for Dysphagia    Factors Affecting Performance no difficulty participating in study  -TM    Clinical Impression: Swallowing Mild-Moderate:;oropharyngeal phase dysphagia  -TM    Prognosis Comment Good   -TM    Impact on Function risk for aspiration  -TM    Recommendations for Diet/Nutirition full oral diet   Mech soft with nectar thick liquids.   -TM    Swallowing Precautions upright position for at least 30 minutes after meals;small sips and bites when eating  -TM      User Key  (r) = Recorded By, (t) = Taken By, (c) = Cosigned By    Initials Name Provider Type    TM Danielle Abrams CCC-SLP Speech and Language Pathologist                            OP SLP Education       06/23/17 6167    Education    Barriers to Learning Other (comment0   Underlying cognitive impairment.  -TM    Action Taken to Address Barriers Verbal and visual education  -TM    Education Provided Described results of evaluation;Patient expressed understanding of evaluation;Family/caregivers expressed  understanding of evaluation  -TM    Assessed Learning needs;Learning motivation;Learning preferences;Learning readiness  -TM    Learning Motivation Moderate  -TM    Learning Method Explanation;Demonstration  -TM    Teaching Response Verbalized understanding  -TM    Education Comments The results of this study were discussed in full with the pt and the pt's . Though both parties appeared to have some cognitive impairment, through verbal and visual education methods, it is felt both individuals understood ST recommendations at this time. They are aware that ST continues to recommend home health ST services for dysphagia and it is recommended that the home health therapist watch the pt with a trial meal with thin liquids to determine if the pt's endurance allows her to be able to safely tolerate thin liquids. The pt and  were educated that the pt is ok for thin liquid water in between meals, being 30 minutes following any other PO intake. The pt inquired re: if ice cream was safe to be consumed at this time. She was educated that it is not recommended at this time, secondary to the increased risk for aspiration. General aspiration precautions were reviewed with pt and .   -TM      User Key  (r) = Recorded By, (t) = Taken By, (c) = Cosigned By    Initials Name Effective Dates    CHRISTOPHER Abrams CCC-SLP 08/02/16 -                     OP SLP Assessment/Plan - 06/23/17 1503     SLP Assessment    Clinical Impression: Swallowing Mild-Moderate:;oropharyngeal phase dysphagia  -TM      User Key  (r) = Recorded By, (t) = Taken By, (c) = Cosigned By    Initials Name Provider Type    CHRISTOPHER Abrams CCC-SLP Speech and Language Pathologist              SLP Outcome Measures (last 72 hours)      SLP Outcome Measures       06/23/17 1701          SLP Outcome Measures    Outcome Measure Used? Adult NOMS  -TM      FCM Scores    FCM Chosen Swallowing  -TM      Swallowing FCM Score 4  -TM        User Key  (r) =  Recorded By, (t) = Taken By, (c) = Cosigned By    Initials Name Effective Dates    TM EUGENE Yao 08/02/16 -                Time Calculation:   SLP Start Time: 1503  SLP Stop Time: 1630  SLP Time Calculation (min): 87 min    Therapy Charges for Today     Code Description Service Date Service Provider Modifiers Qty    94261860303 HC ST SWALLOWING CURRENT STATUS 6/23/2017 EUGENE Yao GN, CK 1    52109647287 HC ST SWALLOWING PROJECTED 6/23/2017 EUGENE Yao GN, CK 1    16633294011 HC ST SWALLOWING DISCHARGE 6/23/2017 EUGENE Yao GN, CK 1    14811702716 HC ST MOTION FLUORO EVAL SWALLOW 6 6/23/2017 EUGENE Yao GN 1          SLP G-Codes  SLP NOMS Used?: Yes  Functional Limitations: Swallowing  Swallow Current Status (): At least 40 percent but less than 60 percent impaired, limited or restricted  Swallow Goal Status (): At least 40 percent but less than 60 percent impaired, limited or restricted  Swallow Discharge Status (): At least 40 percent but less than 60 percent impaired, limited or restricted        EUGENE Palacios  6/23/2017

## 2017-07-14 ENCOUNTER — LAB REQUISITION (OUTPATIENT)
Dept: LAB | Facility: HOSPITAL | Age: 52
End: 2017-07-14

## 2017-07-14 DIAGNOSIS — Z00.00 ENCOUNTER FOR GENERAL ADULT MEDICAL EXAMINATION WITHOUT ABNORMAL FINDINGS: ICD-10-CM

## 2017-07-14 LAB
BACTERIA UR QL AUTO: ABNORMAL /HPF
BILIRUB UR QL STRIP: NEGATIVE
CLARITY UR: CLEAR
COLOR UR: YELLOW
GLUCOSE UR STRIP-MCNC: NEGATIVE MG/DL
HGB UR QL STRIP.AUTO: NEGATIVE
KETONES UR QL STRIP: NEGATIVE
LEUKOCYTE ESTERASE UR QL STRIP.AUTO: ABNORMAL
NITRITE UR QL STRIP: POSITIVE
PH UR STRIP.AUTO: 7.5 [PH] (ref 5–8)
PROT UR QL STRIP: NEGATIVE
RBC # UR: ABNORMAL /HPF
REF LAB TEST METHOD: ABNORMAL
SP GR UR STRIP: 1.01 (ref 1–1.03)
SQUAMOUS #/AREA URNS HPF: ABNORMAL /HPF
UROBILINOGEN UR QL STRIP: ABNORMAL
WBC UR QL AUTO: ABNORMAL /HPF

## 2017-07-14 PROCEDURE — 87086 URINE CULTURE/COLONY COUNT: CPT | Performed by: INTERNAL MEDICINE

## 2017-07-14 PROCEDURE — 87077 CULTURE AEROBIC IDENTIFY: CPT | Performed by: INTERNAL MEDICINE

## 2017-07-14 PROCEDURE — 87186 SC STD MICRODIL/AGAR DIL: CPT | Performed by: INTERNAL MEDICINE

## 2017-07-14 PROCEDURE — 81001 URINALYSIS AUTO W/SCOPE: CPT | Performed by: INTERNAL MEDICINE

## 2017-07-17 LAB — BACTERIA SPEC AEROBE CULT: ABNORMAL

## 2017-07-24 RX ORDER — FLUCONAZOLE 150 MG/1
150 TABLET ORAL 2 TIMES WEEKLY
Qty: 2 TABLET | Refills: 0 | Status: SHIPPED | OUTPATIENT
Start: 2017-07-24 | End: 2017-07-27 | Stop reason: SDUPTHER

## 2017-07-26 ENCOUNTER — HOSPITAL ENCOUNTER (OUTPATIENT)
Dept: PAIN MANAGEMENT | Age: 52
Discharge: HOME | End: 2017-07-26
Payer: MEDICARE

## 2017-07-26 ENCOUNTER — TELEPHONE (OUTPATIENT)
Dept: OBSTETRICS AND GYNECOLOGY | Facility: CLINIC | Age: 52
End: 2017-07-26

## 2017-07-26 DIAGNOSIS — G56.23 ULNAR NEUROPATHY OF BOTH UPPER EXTREMITIES: ICD-10-CM

## 2017-07-26 PROCEDURE — 2500000003 HC RX 250 WO HCPCS

## 2017-07-26 PROCEDURE — 80307 DRUG TEST PRSMV CHEM ANLYZR: CPT

## 2017-07-26 PROCEDURE — 6360000002 HC RX W HCPCS

## 2017-07-26 PROCEDURE — 62369 ANAL SP INF PMP W/REPRG&FILL: CPT

## 2017-07-26 RX ORDER — CARBAMAZEPINE 200 MG/1
200 TABLET ORAL 2 TIMES DAILY
Qty: 60 TABLET | Refills: 2 | Status: SHIPPED | OUTPATIENT
Start: 2017-07-26 | End: 2017-09-19 | Stop reason: SDUPTHER

## 2017-07-26 RX ORDER — OXYCODONE AND ACETAMINOPHEN 10; 325 MG/1; MG/1
TABLET ORAL
Qty: 240 TABLET | Refills: 0 | Status: SHIPPED | OUTPATIENT
Start: 2017-08-17 | End: 2017-09-19 | Stop reason: SDUPTHER

## 2017-07-26 RX ORDER — TIZANIDINE 4 MG/1
8 TABLET ORAL 3 TIMES DAILY PRN
Qty: 180 TABLET | Refills: 2 | Status: SHIPPED | OUTPATIENT
Start: 2017-07-26 | End: 2017-09-19 | Stop reason: SDUPTHER

## 2017-07-26 RX ORDER — PREGABALIN 150 MG/1
150 CAPSULE ORAL 2 TIMES DAILY
Qty: 60 CAPSULE | Refills: 2 | OUTPATIENT
Start: 2017-07-26 | End: 2017-09-19 | Stop reason: SDUPTHER

## 2017-07-26 NOTE — TELEPHONE ENCOUNTER
Patient calls stating she has called our office requesting diflucan and we gave her 2 pills. *I see no notation of any phone encounters from patient*  She requests to have at least FIVE to cure this.   You saw patient last on 07/2016.  Please review, as I did not give her the first two, nor did I speak to her or have documentation to go on.- Adam

## 2017-07-27 RX ORDER — FLUCONAZOLE 150 MG/1
150 TABLET ORAL ONCE
Qty: 3 TABLET | Refills: 0 | Status: SHIPPED | OUTPATIENT
Start: 2017-07-27 | End: 2017-07-27

## 2017-07-27 NOTE — TELEPHONE ENCOUNTER
She can have 3 more.  If she has any further problems she will need to come in.   Will also need an annual scheduled.

## 2017-08-02 LAB
AMPHETAMINES, URINE: NEGATIVE NG/ML
BARBITURATES, URINE: NEGATIVE NG/ML
BENZODIAZEPINES, URINE: ABNORMAL NG/ML
BENZODIAZEPINES, URINE: NEGATIVE NG/ML
CANNABINOIDS, URINE: NEGATIVE NG/ML
COCAINE METABOLITE, URINE: NEGATIVE NG/ML
CODEINE, URINE: NEGATIVE
CREATININE, URINE: 10.3 MG/DL (ref 20–300)
ETHANOL U, QUAN: NEGATIVE %
FENTANYL URINE: NEGATIVE PG/ML
HYDROCODONE, URINE: NEGATIVE
HYDROMORPHONE, UR CONF: 455 NG/ML
HYDROMORPHONE, URINE: POSITIVE
MEPERIDINE, UR: NEGATIVE NG/ML
METHADONE SCREEN, URINE: NEGATIVE NG/ML
MORPHINE URINE: NEGATIVE
OPIATES, URINE: ABNORMAL NG/ML
OPIATES, URINE: POSITIVE NG/ML
OXYCODONE URINE: POSITIVE
OXYCODONE, UR GC/MS: 515 NG/ML
OXYCODONE/OXYMORPH, UR: POSITIVE
OXYCODONE/OXYMORPHONE, UR: ABNORMAL NG/ML
OXYMORPHONE, UR GC/MS: 315 NG/ML
OXYMORPHONE, URINE: POSITIVE
PH, URINE: 7.1 (ref 4.5–8.9)
PHENCYCLIDINE, URINE: NEGATIVE NG/ML
PROPOXYPHENE, URINE: NEGATIVE NG/ML
SPECIFIC GRAVITY, URINE: 1.01

## 2017-08-03 ENCOUNTER — TRANSCRIBE ORDERS (OUTPATIENT)
Dept: ADMINISTRATIVE | Facility: HOSPITAL | Age: 52
End: 2017-08-03

## 2017-08-03 DIAGNOSIS — Z12.31 ENCOUNTER FOR SCREENING MAMMOGRAM FOR MALIGNANT NEOPLASM OF BREAST: Primary | ICD-10-CM

## 2017-08-14 RX ORDER — ATORVASTATIN CALCIUM 40 MG/1
TABLET, FILM COATED ORAL
Qty: 30 TABLET | Refills: 2 | Status: SHIPPED | OUTPATIENT
Start: 2017-08-14

## 2017-09-19 ENCOUNTER — HOSPITAL ENCOUNTER (OUTPATIENT)
Dept: PAIN MANAGEMENT | Age: 52
Discharge: HOME | End: 2017-09-19
Payer: MEDICARE

## 2017-09-19 DIAGNOSIS — G56.23 ULNAR NEUROPATHY OF BOTH UPPER EXTREMITIES: ICD-10-CM

## 2017-09-19 PROCEDURE — 6360000002 HC RX W HCPCS

## 2017-09-19 PROCEDURE — 2500000003 HC RX 250 WO HCPCS

## 2017-09-19 PROCEDURE — 62369 ANAL SP INF PMP W/REPRG&FILL: CPT

## 2017-09-19 RX ORDER — AMITRIPTYLINE HYDROCHLORIDE 25 MG/1
25 TABLET, FILM COATED ORAL NIGHTLY
Qty: 30 TABLET | Refills: 5 | Status: SHIPPED | OUTPATIENT
Start: 2017-09-19 | End: 2018-02-21 | Stop reason: SDUPTHER

## 2017-09-19 RX ORDER — OXYCODONE AND ACETAMINOPHEN 10; 325 MG/1; MG/1
TABLET ORAL
Qty: 240 TABLET | Refills: 0 | Status: SHIPPED | OUTPATIENT
Start: 2017-09-19 | End: 2017-11-09 | Stop reason: SDUPTHER

## 2017-09-19 RX ORDER — CARBAMAZEPINE 200 MG/1
200 TABLET ORAL 2 TIMES DAILY
Qty: 60 TABLET | Refills: 2 | Status: SHIPPED | OUTPATIENT
Start: 2017-09-19 | End: 2018-01-03 | Stop reason: SDUPTHER

## 2017-09-19 RX ORDER — TIZANIDINE 4 MG/1
8 TABLET ORAL 3 TIMES DAILY PRN
Qty: 180 TABLET | Refills: 2 | Status: SHIPPED | OUTPATIENT
Start: 2017-09-19 | End: 2018-01-03 | Stop reason: SDUPTHER

## 2017-09-19 RX ORDER — PREGABALIN 150 MG/1
150 CAPSULE ORAL 2 TIMES DAILY
Qty: 60 CAPSULE | Refills: 2 | Status: SHIPPED | OUTPATIENT
Start: 2017-09-19 | End: 2017-12-04 | Stop reason: SDUPTHER

## 2017-09-21 ENCOUNTER — TRANSCRIBE ORDERS (OUTPATIENT)
Dept: GENERAL RADIOLOGY | Facility: HOSPITAL | Age: 52
End: 2017-09-21

## 2017-09-21 ENCOUNTER — HOSPITAL ENCOUNTER (OUTPATIENT)
Dept: GENERAL RADIOLOGY | Facility: HOSPITAL | Age: 52
Discharge: HOME | End: 2017-09-21
Admitting: INTERNAL MEDICINE

## 2017-09-21 DIAGNOSIS — J18.9 UNRESOLVED PNEUMONIA: Primary | ICD-10-CM

## 2017-09-21 PROCEDURE — 71010 HC CHEST PA OR AP: CPT

## 2017-11-09 ENCOUNTER — HOSPITAL ENCOUNTER (OUTPATIENT)
Dept: PAIN MANAGEMENT | Age: 52
Discharge: HOME | End: 2017-11-09
Payer: MEDICARE

## 2017-11-09 DIAGNOSIS — G56.23 ULNAR NEUROPATHY OF BOTH UPPER EXTREMITIES: ICD-10-CM

## 2017-11-09 PROCEDURE — 6360000002 HC RX W HCPCS

## 2017-11-09 PROCEDURE — 2500000003 HC RX 250 WO HCPCS

## 2017-11-09 PROCEDURE — 62369 ANAL SP INF PMP W/REPRG&FILL: CPT

## 2017-11-09 RX ORDER — OXYCODONE AND ACETAMINOPHEN 10; 325 MG/1; MG/1
TABLET ORAL
Qty: 240 TABLET | Refills: 0 | Status: SHIPPED | OUTPATIENT
Start: 2017-11-09 | End: 2018-01-03 | Stop reason: SDUPTHER

## 2017-12-04 RX ORDER — PREGABALIN 150 MG/1
150 CAPSULE ORAL 2 TIMES DAILY
Qty: 60 CAPSULE | Refills: 2 | Status: SHIPPED | OUTPATIENT
Start: 2017-12-04 | End: 2018-02-21 | Stop reason: SDUPTHER

## 2017-12-28 ENCOUNTER — HOSPITAL ENCOUNTER (OUTPATIENT)
Dept: PAIN MANAGEMENT | Age: 52
Discharge: HOME | End: 2017-12-28
Payer: MEDICARE

## 2018-01-03 ENCOUNTER — HOSPITAL ENCOUNTER (OUTPATIENT)
Dept: PAIN MANAGEMENT | Age: 53
Discharge: HOME | End: 2018-01-03
Payer: MEDICARE

## 2018-01-03 PROCEDURE — 6360000002 HC RX W HCPCS

## 2018-01-03 PROCEDURE — 62369 ANAL SP INF PMP W/REPRG&FILL: CPT

## 2018-01-03 PROCEDURE — 2500000003 HC RX 250 WO HCPCS

## 2018-01-03 RX ORDER — CARBAMAZEPINE 200 MG/1
200 TABLET ORAL 2 TIMES DAILY
Qty: 60 TABLET | Refills: 2 | Status: SHIPPED | OUTPATIENT
Start: 2018-01-03 | End: 2018-02-21 | Stop reason: SDUPTHER

## 2018-01-03 RX ORDER — OXYCODONE AND ACETAMINOPHEN 10; 325 MG/1; MG/1
TABLET ORAL
Qty: 240 TABLET | Refills: 0 | Status: SHIPPED | OUTPATIENT
Start: 2018-01-08 | End: 2018-02-07

## 2018-01-03 RX ORDER — TIZANIDINE 4 MG/1
8 TABLET ORAL 3 TIMES DAILY PRN
Qty: 180 TABLET | Refills: 2 | Status: SHIPPED | OUTPATIENT
Start: 2018-01-03

## 2018-01-03 NOTE — PROGRESS NOTES
Myah Saldana/Nitin  Intrathecal Pump Refill      Reason for visit today:  Pump Refill       Performed by: [x] RN   [] APRN   [] DOCTOR     B/P - 142/90       Temp - 97.4       Pulse - 89       Respirations - 18        SpO2 - 98% RA         Pupils - 3  mm     Oxygen Therapy: [] Yes  [x] No  Device/Liters -    MARKUS report reviewed:    [x] Appropriate  [] Inappropriate, reviewed with physician    Diagnosis: Lumbar DDD                                                                                  Pain Ratin10    Last Oral Pain Medication taken: Today around 1230. Brief History & Current Issues: Patient is having pain in her bilateral shoulders. Patient denies any falls,injuries or x-rays since last visit. Has been considering harming self to escape stress, pain, problems?   no       Has a sucide plan? no    Has attempted sucide in the past? no    Has a close friend or family member who committed sucide? no    Pump medication:   []  Baclofen:   [x]  Bupivacaine: 20 mg/ml   []  Clonidine:      [x]  Dilaudid: 10 mg/ml   []  Fentanyl:    []  Morphine:   []  Prialt:    []  Sufentanil:     Current medication daily rate:     []  Baclofen:   [x]  Bupivacaine: 6.334 mg/day    []  Clonidine:      [x]  Dilaudid: 3.167 mg/day   []  Fentanyl:    []  Morphine:   []  Prialt:    []  Sufentanil:     Patient Therapy Manager (PTM) [] Yes [x] No    Pump Refill Procedure Note:  Implanted pump interrogated and pump area prepped according to protocol. Template placed over pump. Using sterile technique, pump accessed with #22 gauge [x] 1.5 inch, [] 2 inch non-coring needle securely attached to extension tubing and an empty 20 ml syringe. Accessed X 1 stick/s. Needle inserted into pump at a 90 degree angle. Pump emptied between syringe changes. Pump filled with sterile solution using a 0.22 micron bacterial-retentive filter at rate of 1ml/3seconds. Small amount of solution withdrawn to confirm proper needle placement.

## 2018-01-08 ENCOUNTER — CLINICAL DOCUMENTATION (OUTPATIENT)
Dept: PAIN MANAGEMENT | Age: 53
End: 2018-01-08

## 2018-01-19 ENCOUNTER — TELEPHONE (OUTPATIENT)
Dept: PAIN MANAGEMENT | Age: 53
End: 2018-01-19

## 2018-01-19 NOTE — TELEPHONE ENCOUNTER
Call from Kwadwo Vences with [facility] requesting date of patient's last pump refill.   Call returned, informed her that pump was filled on 1/3/18

## 2018-02-21 ENCOUNTER — HOSPITAL ENCOUNTER (OUTPATIENT)
Dept: PAIN MANAGEMENT | Age: 53
Discharge: HOME | End: 2018-02-21
Payer: MEDICARE

## 2018-02-21 PROCEDURE — 2500000003 HC RX 250 WO HCPCS

## 2018-02-21 PROCEDURE — 6360000002 HC RX W HCPCS

## 2018-02-21 PROCEDURE — 62369 ANAL SP INF PMP W/REPRG&FILL: CPT

## 2018-02-21 RX ORDER — OXYCODONE AND ACETAMINOPHEN 10; 325 MG/1; MG/1
1 TABLET ORAL EVERY 6 HOURS PRN
Qty: 120 TABLET | Refills: 0 | Status: SHIPPED | OUTPATIENT
Start: 2018-02-21 | End: 2018-02-21 | Stop reason: SDUPTHER

## 2018-02-21 RX ORDER — OXYCODONE AND ACETAMINOPHEN 10; 325 MG/1; MG/1
1-2 TABLET ORAL EVERY 6 HOURS PRN
Qty: 240 TABLET | Refills: 0 | Status: SHIPPED | OUTPATIENT
Start: 2018-02-21 | End: 2018-04-02 | Stop reason: SDUPTHER

## 2018-02-21 RX ORDER — CARBAMAZEPINE 200 MG/1
200 TABLET ORAL 2 TIMES DAILY
Qty: 60 TABLET | Refills: 2 | Status: CANCELLED | OUTPATIENT
Start: 2018-02-21 | End: 2018-05-22

## 2018-02-21 RX ORDER — AMITRIPTYLINE HYDROCHLORIDE 25 MG/1
25 TABLET, FILM COATED ORAL NIGHTLY
Qty: 30 TABLET | Refills: 5 | Status: CANCELLED | OUTPATIENT
Start: 2018-02-21

## 2018-02-21 RX ORDER — PREGABALIN 150 MG/1
150 CAPSULE ORAL 2 TIMES DAILY
Qty: 60 CAPSULE | Refills: 2 | Status: SHIPPED | OUTPATIENT
Start: 2018-02-21 | End: 2018-05-30 | Stop reason: SDUPTHER

## 2018-02-21 RX ORDER — CARBAMAZEPINE 200 MG/1
200 TABLET ORAL 2 TIMES DAILY
Qty: 60 TABLET | Refills: 2 | Status: SHIPPED | OUTPATIENT
Start: 2018-02-21 | End: 2018-05-30 | Stop reason: SDUPTHER

## 2018-02-21 RX ORDER — AMITRIPTYLINE HYDROCHLORIDE 25 MG/1
25 TABLET, FILM COATED ORAL NIGHTLY
Qty: 30 TABLET | Refills: 5 | Status: SHIPPED | OUTPATIENT
Start: 2018-02-21

## 2018-04-03 RX ORDER — OXYCODONE AND ACETAMINOPHEN 10; 325 MG/1; MG/1
1-2 TABLET ORAL EVERY 6 HOURS PRN
Qty: 240 TABLET | Refills: 0 | Status: SHIPPED | OUTPATIENT
Start: 2018-04-04 | End: 2018-04-12 | Stop reason: SDUPTHER

## 2018-04-12 ENCOUNTER — HOSPITAL ENCOUNTER (OUTPATIENT)
Dept: PAIN MANAGEMENT | Age: 53
Discharge: HOME | End: 2018-04-12
Payer: MEDICARE

## 2018-04-12 PROCEDURE — 6360000002 HC RX W HCPCS

## 2018-04-12 PROCEDURE — 62369 ANAL SP INF PMP W/REPRG&FILL: CPT

## 2018-04-12 PROCEDURE — 2500000003 HC RX 250 WO HCPCS

## 2018-04-12 RX ORDER — OXYCODONE AND ACETAMINOPHEN 10; 325 MG/1; MG/1
1-2 TABLET ORAL EVERY 6 HOURS PRN
Qty: 240 TABLET | Refills: 0 | Status: SHIPPED | OUTPATIENT
Start: 2018-05-04 | End: 2018-05-30 | Stop reason: SDUPTHER

## 2018-05-30 ENCOUNTER — HOSPITAL ENCOUNTER (OUTPATIENT)
Dept: PAIN MANAGEMENT | Age: 53
Discharge: HOME | End: 2018-05-30
Payer: MEDICARE

## 2018-05-30 DIAGNOSIS — M51.36 DDD (DEGENERATIVE DISC DISEASE), LUMBAR: Primary | ICD-10-CM

## 2018-05-30 DIAGNOSIS — M62.838 MUSCLE SPASTICITY: ICD-10-CM

## 2018-05-30 PROCEDURE — 2500000003 HC RX 250 WO HCPCS

## 2018-05-30 PROCEDURE — 6360000002 HC RX W HCPCS

## 2018-05-30 PROCEDURE — 62369 ANAL SP INF PMP W/REPRG&FILL: CPT

## 2018-05-30 RX ORDER — OXYCODONE AND ACETAMINOPHEN 10; 325 MG/1; MG/1
1-2 TABLET ORAL EVERY 6 HOURS PRN
Qty: 240 TABLET | Refills: 0 | Status: SHIPPED | OUTPATIENT
Start: 2018-06-09 | End: 2018-07-09

## 2018-05-30 RX ORDER — CARBAMAZEPINE 200 MG/1
200 TABLET ORAL 2 TIMES DAILY
Qty: 60 TABLET | Refills: 2 | Status: SHIPPED | OUTPATIENT
Start: 2018-05-30 | End: 2018-06-30

## 2018-05-30 RX ORDER — PREGABALIN 150 MG/1
150 CAPSULE ORAL 2 TIMES DAILY
Qty: 60 CAPSULE | Refills: 2 | Status: SHIPPED | OUTPATIENT
Start: 2018-06-16 | End: 2018-09-14

## 2019-01-07 ENCOUNTER — TRANSCRIBE ORDERS (OUTPATIENT)
Dept: ADMINISTRATIVE | Facility: HOSPITAL | Age: 54
End: 2019-01-07

## 2019-01-07 ENCOUNTER — OFFICE VISIT (OUTPATIENT)
Dept: OBSTETRICS AND GYNECOLOGY | Facility: CLINIC | Age: 54
End: 2019-01-07

## 2019-01-07 VITALS
BODY MASS INDEX: 22.49 KG/M2 | DIASTOLIC BLOOD PRESSURE: 72 MMHG | SYSTOLIC BLOOD PRESSURE: 120 MMHG | HEIGHT: 65 IN | WEIGHT: 135 LBS

## 2019-01-07 DIAGNOSIS — Z01.419 ENCOUNTER FOR GYNECOLOGICAL EXAMINATION WITHOUT ABNORMAL FINDING: Primary | ICD-10-CM

## 2019-01-07 DIAGNOSIS — Z12.39 SCREENING BREAST EXAMINATION: Primary | ICD-10-CM

## 2019-01-07 DIAGNOSIS — Z12.4 CERVICAL CANCER SCREENING: ICD-10-CM

## 2019-01-07 DIAGNOSIS — Z12.31 SCREENING MAMMOGRAM, ENCOUNTER FOR: ICD-10-CM

## 2019-01-07 PROCEDURE — 87481 CANDIDA DNA AMP PROBE: CPT | Performed by: OBSTETRICS & GYNECOLOGY

## 2019-01-07 PROCEDURE — 87624 HPV HI-RISK TYP POOLED RSLT: CPT | Performed by: OBSTETRICS & GYNECOLOGY

## 2019-01-07 PROCEDURE — 99213 OFFICE O/P EST LOW 20 MIN: CPT | Performed by: OBSTETRICS & GYNECOLOGY

## 2019-01-07 PROCEDURE — 87798 DETECT AGENT NOS DNA AMP: CPT | Performed by: OBSTETRICS & GYNECOLOGY

## 2019-01-07 PROCEDURE — 87512 GARDNER VAG DNA QUANT: CPT | Performed by: OBSTETRICS & GYNECOLOGY

## 2019-01-07 PROCEDURE — 87661 TRICHOMONAS VAGINALIS AMPLIF: CPT | Performed by: OBSTETRICS & GYNECOLOGY

## 2019-01-07 PROCEDURE — G0123 SCREEN CERV/VAG THIN LAYER: HCPCS | Performed by: OBSTETRICS & GYNECOLOGY

## 2019-01-07 RX ORDER — CYANOCOBALAMIN 1000 UG/ML
1000 INJECTION, SOLUTION INTRAMUSCULAR; SUBCUTANEOUS
COMMUNITY

## 2019-01-07 RX ORDER — ALBUTEROL SULFATE 0.63 MG/3ML
SOLUTION RESPIRATORY (INHALATION)
COMMUNITY

## 2019-01-07 RX ORDER — ALBUTEROL SULFATE 1.25 MG/3ML
SOLUTION RESPIRATORY (INHALATION)
COMMUNITY
Start: 2018-10-10 | End: 2019-01-07 | Stop reason: SDUPTHER

## 2019-01-07 RX ORDER — ESOMEPRAZOLE MAGNESIUM 40 MG/1
40 GRANULE, DELAYED RELEASE ORAL
COMMUNITY
Start: 2018-12-14

## 2019-01-07 RX ORDER — ALPRAZOLAM 0.5 MG/1
0.5 TABLET ORAL 3 TIMES DAILY PRN
COMMUNITY
Start: 2018-12-14

## 2019-01-07 RX ORDER — MOMETASONE FUROATE 50 UG/1
SPRAY, METERED NASAL
COMMUNITY
End: 2019-07-24

## 2019-01-07 RX ORDER — GUAIFENESIN 600 MG/1
1200 TABLET, EXTENDED RELEASE ORAL DAILY
COMMUNITY

## 2019-01-07 RX ORDER — DOCUSATE SODIUM 100 MG/1
100 CAPSULE, LIQUID FILLED ORAL
COMMUNITY
End: 2019-07-24

## 2019-01-07 RX ORDER — LEVOCETIRIZINE DIHYDROCHLORIDE 5 MG/1
TABLET, FILM COATED ORAL
COMMUNITY
Start: 2018-12-14 | End: 2019-01-07 | Stop reason: SDUPTHER

## 2019-01-07 RX ORDER — LISINOPRIL 5 MG/1
5 TABLET ORAL DAILY
COMMUNITY
Start: 2018-12-14

## 2019-01-07 RX ORDER — NYSTATIN 10B UNIT
POWDER (EA) MISCELLANEOUS
COMMUNITY
End: 2019-07-24
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
## 2019-01-15 LAB
GEN CATEG CVX/VAG CYTO-IMP: NORMAL
LAB AP CASE REPORT: NORMAL
LAB AP GYN ADDITIONAL INFORMATION: NORMAL
LAB AP GYN OTHER FINDINGS: NORMAL
PATH INTERP SPEC-IMP: NORMAL
STAT OF ADQ CVX/VAG CYTO-IMP: NORMAL

## 2019-01-18 NOTE — PROGRESS NOTES
Please let the patient know that her PAP smear was negative and negative for hPV. Her BV panel showed yeast. Ask her about symptoms.

## 2019-01-23 DIAGNOSIS — B37.9 YEAST INFECTION: Primary | ICD-10-CM

## 2019-01-23 RX ORDER — FLUCONAZOLE 150 MG/1
TABLET ORAL
Qty: 5 TABLET | Refills: 0 | Status: SHIPPED | OUTPATIENT
Start: 2019-01-23 | End: 2019-07-24

## 2019-02-04 ENCOUNTER — APPOINTMENT (OUTPATIENT)
Dept: MAMMOGRAPHY | Facility: HOSPITAL | Age: 54
End: 2019-02-04
Attending: EMERGENCY MEDICINE

## 2019-03-05 ENCOUNTER — HOSPITAL ENCOUNTER (OUTPATIENT)
Dept: MAMMOGRAPHY | Facility: HOSPITAL | Age: 54
Discharge: HOME | End: 2019-03-05
Admitting: EMERGENCY MEDICINE

## 2019-03-05 DIAGNOSIS — Z12.39 SCREENING BREAST EXAMINATION: ICD-10-CM

## 2019-03-05 PROCEDURE — 77067 SCR MAMMO BI INCL CAD: CPT

## 2019-03-05 PROCEDURE — 77063 BREAST TOMOSYNTHESIS BI: CPT

## 2019-07-24 ENCOUNTER — OFFICE VISIT (OUTPATIENT)
Dept: NEUROSURGERY | Facility: CLINIC | Age: 54
End: 2019-07-24

## 2019-07-24 VITALS — HEIGHT: 65 IN | WEIGHT: 135 LBS | BODY MASS INDEX: 22.49 KG/M2

## 2019-07-24 DIAGNOSIS — G89.4 CHRONIC PAIN SYNDROME: ICD-10-CM

## 2019-07-24 DIAGNOSIS — F17.210 CIGARETTE SMOKER: ICD-10-CM

## 2019-07-24 DIAGNOSIS — G82.50 QUADRIPLEGIA (HCC): ICD-10-CM

## 2019-07-24 DIAGNOSIS — T85.610A MALFUNCTION OF INTRATHECAL INFUSION PUMP, INITIAL ENCOUNTER: Primary | ICD-10-CM

## 2019-07-24 PROBLEM — T85.615A MALFUNCTION OF INTRATHECAL INFUSION PUMP: Status: ACTIVE | Noted: 2019-07-24

## 2019-07-24 PROCEDURE — 99215 OFFICE O/P EST HI 40 MIN: CPT | Performed by: NEUROLOGICAL SURGERY

## 2019-07-24 RX ORDER — POTASSIUM CHLORIDE 750 MG/1
10 CAPSULE, EXTENDED RELEASE ORAL DAILY
COMMUNITY
Start: 2019-05-09

## 2019-07-24 RX ORDER — NITROGLYCERIN 0.4 MG/1
0.4 TABLET SUBLINGUAL
COMMUNITY

## 2019-07-24 RX ORDER — ESCITALOPRAM OXALATE 20 MG/1
20 TABLET ORAL DAILY
COMMUNITY
Start: 2019-07-09

## 2019-07-24 RX ORDER — MAGNESIUM HYDROXIDE 1200 MG/15ML
1000 LIQUID ORAL ONCE
COMMUNITY
Start: 2019-07-17

## 2019-07-24 RX ORDER — ZINC GLUCONATE 50 MG
1 TABLET ORAL DAILY
COMMUNITY

## 2019-07-24 RX ORDER — ASCORBIC ACID 500 MG
500 TABLET ORAL DAILY
COMMUNITY

## 2019-07-24 RX ORDER — ONDANSETRON 4 MG/1
4 TABLET, ORALLY DISINTEGRATING ORAL EVERY 12 HOURS PRN
COMMUNITY
Start: 2019-07-05

## 2019-07-24 RX ORDER — BACLOFEN 10 MG/1
10 TABLET ORAL 4 TIMES DAILY
COMMUNITY
Start: 2019-07-09

## 2019-07-24 RX ORDER — LEVOCETIRIZINE DIHYDROCHLORIDE 5 MG/1
5 TABLET, FILM COATED ORAL EVERY EVENING
COMMUNITY
Start: 2019-07-09

## 2019-07-24 RX ORDER — FLUDROCORTISONE ACETATE 0.1 MG/1
0.1 TABLET ORAL 3 TIMES DAILY
COMMUNITY
Start: 2019-07-09

## 2019-07-24 RX ORDER — MIDODRINE HYDROCHLORIDE 5 MG/1
5 TABLET ORAL 3 TIMES DAILY
COMMUNITY
Start: 2019-07-09
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
## 2019-07-30 ENCOUNTER — APPOINTMENT (OUTPATIENT)
Dept: PREADMISSION TESTING | Facility: HOSPITAL | Age: 54
End: 2019-07-30

## 2019-07-30 DIAGNOSIS — T85.610A MALFUNCTION OF INTRATHECAL INFUSION PUMP, INITIAL ENCOUNTER: ICD-10-CM

## 2019-07-30 LAB
ANION GAP SERPL CALCULATED.3IONS-SCNC: 4 MMOL/L (ref 4–13)
BASOPHILS # BLD AUTO: 0.01 10*3/MM3 (ref 0–0.2)
BASOPHILS NFR BLD AUTO: 0.3 % (ref 0–2)
BUN BLD-MCNC: 8 MG/DL (ref 5–21)
BUN/CREAT SERPL: 25 (ref 7–25)
CALCIUM SPEC-SCNC: 9.2 MG/DL (ref 8.4–10.4)
CHLORIDE SERPL-SCNC: 108 MMOL/L (ref 98–110)
CO2 SERPL-SCNC: 31 MMOL/L (ref 24–31)
CREAT BLD-MCNC: 0.32 MG/DL (ref 0.5–1.4)
DEPRECATED RDW RBC AUTO: 49.1 FL (ref 40–54)
EOSINOPHIL # BLD AUTO: 0.08 10*3/MM3 (ref 0–0.7)
EOSINOPHIL NFR BLD AUTO: 2.5 % (ref 0–4)
ERYTHROCYTE [DISTWIDTH] IN BLOOD BY AUTOMATED COUNT: 13.7 % (ref 12–15)
GFR SERPL CREATININE-BSD FRML MDRD: >150 ML/MIN/1.73
GLUCOSE BLD-MCNC: 95 MG/DL (ref 70–100)
HCT VFR BLD AUTO: 33.9 % (ref 37–47)
HGB BLD-MCNC: 10.5 G/DL (ref 12–16)
IMM GRANULOCYTES # BLD AUTO: 0.01 10*3/MM3 (ref 0–0.05)
IMM GRANULOCYTES NFR BLD AUTO: 0.3 % (ref 0–5)
LYMPHOCYTES # BLD AUTO: 0.89 10*3/MM3 (ref 0.72–4.86)
LYMPHOCYTES NFR BLD AUTO: 27.9 % (ref 15–45)
MCH RBC QN AUTO: 30.2 PG (ref 28–32)
MCHC RBC AUTO-ENTMCNC: 31 G/DL (ref 33–36)
MCV RBC AUTO: 97.4 FL (ref 82–98)
MONOCYTES # BLD AUTO: 0.19 10*3/MM3 (ref 0.19–1.3)
MONOCYTES NFR BLD AUTO: 6 % (ref 4–12)
NEUTROPHILS # BLD AUTO: 2.01 10*3/MM3 (ref 1.87–8.4)
NEUTROPHILS NFR BLD AUTO: 63 % (ref 39–78)
NRBC BLD AUTO-RTO: 0 /100 WBC (ref 0–0.2)
PLATELET # BLD AUTO: 102 10*3/MM3 (ref 130–400)
PMV BLD AUTO: 13 FL (ref 6–12)
POTASSIUM BLD-SCNC: 3.6 MMOL/L (ref 3.5–5.3)
RBC # BLD AUTO: 3.48 10*6/MM3 (ref 4.2–5.4)
SODIUM BLD-SCNC: 143 MMOL/L (ref 135–145)
WBC NRBC COR # BLD: 3.19 10*3/MM3 (ref 4.8–10.8)

## 2019-07-30 PROCEDURE — 80048 BASIC METABOLIC PNL TOTAL CA: CPT | Performed by: NEUROLOGICAL SURGERY

## 2019-07-30 PROCEDURE — 36415 COLL VENOUS BLD VENIPUNCTURE: CPT

## 2019-07-30 PROCEDURE — 85025 COMPLETE CBC W/AUTO DIFF WBC: CPT | Performed by: NEUROLOGICAL SURGERY

## 2019-07-30 RX ORDER — TIZANIDINE 4 MG/1
4 TABLET ORAL NIGHTLY PRN
COMMUNITY

## 2019-08-06 ENCOUNTER — ANESTHESIA (OUTPATIENT)
Dept: PERIOP | Facility: HOSPITAL | Age: 54
End: 2019-08-06

## 2019-08-06 ENCOUNTER — HOSPITAL ENCOUNTER (OUTPATIENT)
Facility: HOSPITAL | Age: 54
Discharge: HOME | End: 2019-08-07
Attending: NEUROLOGICAL SURGERY | Admitting: NEUROLOGICAL SURGERY

## 2019-08-06 ENCOUNTER — ANESTHESIA EVENT (OUTPATIENT)
Dept: PERIOP | Facility: HOSPITAL | Age: 54
End: 2019-08-06

## 2019-08-06 DIAGNOSIS — T85.610A MALFUNCTION OF INTRATHECAL INFUSION PUMP, INITIAL ENCOUNTER: ICD-10-CM

## 2019-08-06 LAB
ABO GROUP BLD: NORMAL
BLD GP AB SCN SERPL QL: NEGATIVE
RH BLD: NEGATIVE
T&S EXPIRATION DATE: NORMAL

## 2019-08-06 PROCEDURE — 62360 INSERT SPINE INFUSION DEVICE: CPT | Performed by: NEUROLOGICAL SURGERY

## 2019-08-06 PROCEDURE — A9270 NON-COVERED ITEM OR SERVICE: HCPCS | Performed by: NURSE PRACTITIONER

## 2019-08-06 PROCEDURE — 63710000001 VITAMIN C 500 MG TABLET: Performed by: NURSE PRACTITIONER

## 2019-08-06 PROCEDURE — 63710000001 AMITRIPTYLINE 25 MG TABLET: Performed by: NURSE PRACTITIONER

## 2019-08-06 PROCEDURE — 25010000002 HEPARIN (PORCINE) PER 1000 UNITS: Performed by: NURSE PRACTITIONER

## 2019-08-06 PROCEDURE — C1787 PATIENT PROGR, NEUROSTIM: HCPCS | Performed by: NEUROLOGICAL SURGERY

## 2019-08-06 PROCEDURE — 63710000001 ESCITALOPRAM 10 MG TABLET: Performed by: NURSE PRACTITIONER

## 2019-08-06 PROCEDURE — 63710000001 ATORVASTATIN 40 MG TABLET: Performed by: NURSE PRACTITIONER

## 2019-08-06 PROCEDURE — 63710000001 GUAIFENESIN 600 MG TABLET SUSTAINED-RELEASE 12 HOUR: Performed by: NURSE PRACTITIONER

## 2019-08-06 PROCEDURE — 25010000002 FENTANYL CITRATE (PF) 100 MCG/2ML SOLUTION: Performed by: NURSE ANESTHETIST, CERTIFIED REGISTERED

## 2019-08-06 PROCEDURE — 94799 UNLISTED PULMONARY SVC/PX: CPT

## 2019-08-06 PROCEDURE — 25010000002 NEOSTIGMINE PER 0.5 MG: Performed by: NURSE ANESTHETIST, CERTIFIED REGISTERED

## 2019-08-06 PROCEDURE — 63710000001 SENNA-DOCUSATE 8.6-50 MG TABLET: Performed by: NURSE PRACTITIONER

## 2019-08-06 PROCEDURE — 25010000002 DEXAMETHASONE PER 1 MG: Performed by: ANESTHESIOLOGY

## 2019-08-06 PROCEDURE — 86900 BLOOD TYPING SEROLOGIC ABO: CPT | Performed by: NEUROLOGICAL SURGERY

## 2019-08-06 PROCEDURE — 86850 RBC ANTIBODY SCREEN: CPT | Performed by: NEUROLOGICAL SURGERY

## 2019-08-06 PROCEDURE — 94640 AIRWAY INHALATION TREATMENT: CPT

## 2019-08-06 PROCEDURE — 25010000002 CEFAZOLIN PER 500 MG: Performed by: NURSE PRACTITIONER

## 2019-08-06 PROCEDURE — 63710000001 OXYCODONE-ACETAMINOPHEN 5-325 MG TABLET: Performed by: NURSE PRACTITIONER

## 2019-08-06 PROCEDURE — 25010000002 MIDAZOLAM PER 1 MG: Performed by: ANESTHESIOLOGY

## 2019-08-06 PROCEDURE — 25010000002 PROPOFOL 10 MG/ML EMULSION: Performed by: NURSE ANESTHETIST, CERTIFIED REGISTERED

## 2019-08-06 PROCEDURE — 25010000002 DEXAMETHASONE PER 1 MG: Performed by: NURSE ANESTHETIST, CERTIFIED REGISTERED

## 2019-08-06 PROCEDURE — 63710000001 BACLOFEN 10 MG TABLET: Performed by: NURSE PRACTITIONER

## 2019-08-06 PROCEDURE — 86901 BLOOD TYPING SEROLOGIC RH(D): CPT | Performed by: NEUROLOGICAL SURGERY

## 2019-08-06 PROCEDURE — 99024 POSTOP FOLLOW-UP VISIT: CPT | Performed by: NURSE PRACTITIONER

## 2019-08-06 PROCEDURE — 63710000001 FLUDROCORTISONE 0.1 MG TABLET: Performed by: NURSE PRACTITIONER

## 2019-08-06 PROCEDURE — C1772 INFUSION PUMP, PROGRAMMABLE: HCPCS | Performed by: NEUROLOGICAL SURGERY

## 2019-08-06 PROCEDURE — 25010000002 ONDANSETRON PER 1 MG: Performed by: NURSE ANESTHETIST, CERTIFIED REGISTERED

## 2019-08-06 PROCEDURE — 63710000001 POTASSIUM CHLORIDE 10 MEQ CAPSULE CONTROLLED-RELEASE: Performed by: NURSE PRACTITIONER

## 2019-08-06 PROCEDURE — 63710000001 ALPRAZOLAM 0.5 MG TABLET: Performed by: NURSE PRACTITIONER

## 2019-08-06 PROCEDURE — 63710000001 CALCIUM CARB-CHOLECALCIFEROL 600-800 MG-UNIT TABLET: Performed by: NURSE PRACTITIONER

## 2019-08-06 PROCEDURE — 63710000001 LISINOPRIL 5 MG TABLET: Performed by: NURSE PRACTITIONER

## 2019-08-06 PROCEDURE — 63710000001 CARBAMAZEPINE 200 MG TABLET: Performed by: NURSE PRACTITIONER

## 2019-08-06 PROCEDURE — 63710000001 ZINC GLUCONATE 50 MG TABLET: Performed by: NURSE PRACTITIONER

## 2019-08-06 DEVICE — PUMP NEURO PROGRAM SYNCHROMED2 FLTR 20ML: Type: IMPLANTABLE DEVICE | Status: FUNCTIONAL

## 2019-08-06 RX ORDER — MIDAZOLAM HYDROCHLORIDE 1 MG/ML
1 INJECTION INTRAMUSCULAR; INTRAVENOUS
Status: DISCONTINUED | OUTPATIENT
Start: 2019-08-06 | End: 2019-08-06 | Stop reason: HOSPADM

## 2019-08-06 RX ORDER — ASCORBIC ACID 500 MG
500 TABLET ORAL DAILY
Status: DISCONTINUED | OUTPATIENT
Start: 2019-08-06 | End: 2019-08-07 | Stop reason: HOSPADM

## 2019-08-06 RX ORDER — LABETALOL HYDROCHLORIDE 5 MG/ML
5 INJECTION, SOLUTION INTRAVENOUS
Status: DISCONTINUED | OUTPATIENT
Start: 2019-08-06 | End: 2019-08-06 | Stop reason: HOSPADM

## 2019-08-06 RX ORDER — METOCLOPRAMIDE HYDROCHLORIDE 5 MG/ML
5 INJECTION INTRAMUSCULAR; INTRAVENOUS
Status: DISCONTINUED | OUTPATIENT
Start: 2019-08-06 | End: 2019-08-06 | Stop reason: HOSPADM

## 2019-08-06 RX ORDER — CARBAMAZEPINE 200 MG/1
200 TABLET ORAL 3 TIMES DAILY
Status: DISCONTINUED | OUTPATIENT
Start: 2019-08-06 | End: 2019-08-07 | Stop reason: HOSPADM

## 2019-08-06 RX ORDER — NITROGLYCERIN 0.4 MG/1
0.4 TABLET SUBLINGUAL
Status: DISCONTINUED | OUTPATIENT
Start: 2019-08-06 | End: 2019-08-07 | Stop reason: HOSPADM

## 2019-08-06 RX ORDER — ESCITALOPRAM OXALATE 10 MG/1
20 TABLET ORAL DAILY
Status: DISCONTINUED | OUTPATIENT
Start: 2019-08-06 | End: 2019-08-07 | Stop reason: HOSPADM

## 2019-08-06 RX ORDER — MAGNESIUM HYDROXIDE 1200 MG/15ML
1000 LIQUID ORAL ONCE
Status: DISCONTINUED | OUTPATIENT
Start: 2019-08-06 | End: 2019-08-07 | Stop reason: HOSPADM

## 2019-08-06 RX ORDER — DEXAMETHASONE SODIUM PHOSPHATE 4 MG/ML
4 INJECTION, SOLUTION INTRA-ARTICULAR; INTRALESIONAL; INTRAMUSCULAR; INTRAVENOUS; SOFT TISSUE ONCE AS NEEDED
Status: COMPLETED | OUTPATIENT
Start: 2019-08-06 | End: 2019-08-06

## 2019-08-06 RX ORDER — SODIUM CHLORIDE 0.9 % (FLUSH) 0.9 %
3 SYRINGE (ML) INJECTION EVERY 12 HOURS SCHEDULED
Status: DISCONTINUED | OUTPATIENT
Start: 2019-08-06 | End: 2019-08-07 | Stop reason: HOSPADM

## 2019-08-06 RX ORDER — FUROSEMIDE 20 MG/1
10 TABLET ORAL EVERY EVENING
Status: DISCONTINUED | OUTPATIENT
Start: 2019-08-06 | End: 2019-08-07 | Stop reason: HOSPADM

## 2019-08-06 RX ORDER — MIDODRINE HYDROCHLORIDE 2.5 MG/1
5 TABLET ORAL
Status: DISCONTINUED | OUTPATIENT
Start: 2019-08-06 | End: 2019-08-07 | Stop reason: HOSPADM

## 2019-08-06 RX ORDER — PREGABALIN 75 MG/1
150 CAPSULE ORAL DAILY
Status: DISCONTINUED | OUTPATIENT
Start: 2019-08-06 | End: 2019-08-07 | Stop reason: HOSPADM

## 2019-08-06 RX ORDER — FLUDROCORTISONE ACETATE 0.1 MG/1
0.1 TABLET ORAL 3 TIMES DAILY
Status: DISCONTINUED | OUTPATIENT
Start: 2019-08-06 | End: 2019-08-07 | Stop reason: HOSPADM

## 2019-08-06 RX ORDER — FLUMAZENIL 0.1 MG/ML
0.2 INJECTION INTRAVENOUS AS NEEDED
Status: DISCONTINUED | OUTPATIENT
Start: 2019-08-06 | End: 2019-08-06 | Stop reason: HOSPADM

## 2019-08-06 RX ORDER — IPRATROPIUM BROMIDE AND ALBUTEROL SULFATE 2.5; .5 MG/3ML; MG/3ML
3 SOLUTION RESPIRATORY (INHALATION) ONCE AS NEEDED
Status: DISCONTINUED | OUTPATIENT
Start: 2019-08-06 | End: 2019-08-06 | Stop reason: HOSPADM

## 2019-08-06 RX ORDER — LIDOCAINE HYDROCHLORIDE 40 MG/ML
SOLUTION TOPICAL AS NEEDED
Status: DISCONTINUED | OUTPATIENT
Start: 2019-08-06 | End: 2019-08-06 | Stop reason: SURG

## 2019-08-06 RX ORDER — SODIUM CHLORIDE 0.9 % (FLUSH) 0.9 %
3 SYRINGE (ML) INJECTION AS NEEDED
Status: DISCONTINUED | OUTPATIENT
Start: 2019-08-06 | End: 2019-08-06 | Stop reason: HOSPADM

## 2019-08-06 RX ORDER — LIDOCAINE HYDROCHLORIDE 20 MG/ML
INJECTION, SOLUTION INFILTRATION; PERINEURAL AS NEEDED
Status: DISCONTINUED | OUTPATIENT
Start: 2019-08-06 | End: 2019-08-06 | Stop reason: SURG

## 2019-08-06 RX ORDER — ACETAMINOPHEN 650 MG/1
650 SUPPOSITORY RECTAL EVERY 4 HOURS PRN
Status: DISCONTINUED | OUTPATIENT
Start: 2019-08-06 | End: 2019-08-07 | Stop reason: HOSPADM

## 2019-08-06 RX ORDER — POTASSIUM CHLORIDE 750 MG/1
10 CAPSULE, EXTENDED RELEASE ORAL DAILY
Status: DISCONTINUED | OUTPATIENT
Start: 2019-08-06 | End: 2019-08-07 | Stop reason: HOSPADM

## 2019-08-06 RX ORDER — FENTANYL CITRATE 50 UG/ML
INJECTION, SOLUTION INTRAMUSCULAR; INTRAVENOUS AS NEEDED
Status: DISCONTINUED | OUTPATIENT
Start: 2019-08-06 | End: 2019-08-06 | Stop reason: SURG

## 2019-08-06 RX ORDER — ACETAMINOPHEN 325 MG/1
650 TABLET ORAL EVERY 4 HOURS PRN
Status: DISCONTINUED | OUTPATIENT
Start: 2019-08-06 | End: 2019-08-07 | Stop reason: HOSPADM

## 2019-08-06 RX ORDER — ALBUTEROL SULFATE 1.25 MG/3ML
0.63 SOLUTION RESPIRATORY (INHALATION) EVERY 6 HOURS PRN
Status: DISCONTINUED | OUTPATIENT
Start: 2019-08-06 | End: 2019-08-07 | Stop reason: HOSPADM

## 2019-08-06 RX ORDER — ONDANSETRON 2 MG/ML
4 INJECTION INTRAMUSCULAR; INTRAVENOUS AS NEEDED
Status: DISCONTINUED | OUTPATIENT
Start: 2019-08-06 | End: 2019-08-06 | Stop reason: HOSPADM

## 2019-08-06 RX ORDER — ONDANSETRON 2 MG/ML
4 INJECTION INTRAMUSCULAR; INTRAVENOUS EVERY 6 HOURS PRN
Status: DISCONTINUED | OUTPATIENT
Start: 2019-08-06 | End: 2019-08-07 | Stop reason: HOSPADM

## 2019-08-06 RX ORDER — ATORVASTATIN CALCIUM 40 MG/1
40 TABLET, FILM COATED ORAL NIGHTLY
Status: DISCONTINUED | OUTPATIENT
Start: 2019-08-06 | End: 2019-08-07 | Stop reason: HOSPADM

## 2019-08-06 RX ORDER — FUROSEMIDE 20 MG/1
30 TABLET ORAL DAILY
Status: DISCONTINUED | OUTPATIENT
Start: 2019-08-07 | End: 2019-08-07 | Stop reason: HOSPADM

## 2019-08-06 RX ORDER — SENNA AND DOCUSATE SODIUM 50; 8.6 MG/1; MG/1
2 TABLET, FILM COATED ORAL NIGHTLY
Status: DISCONTINUED | OUTPATIENT
Start: 2019-08-06 | End: 2019-08-07 | Stop reason: HOSPADM

## 2019-08-06 RX ORDER — MORPHINE SULFATE 2 MG/ML
2 INJECTION, SOLUTION INTRAMUSCULAR; INTRAVENOUS
Status: DISCONTINUED | OUTPATIENT
Start: 2019-08-06 | End: 2019-08-06 | Stop reason: HOSPADM

## 2019-08-06 RX ORDER — NALOXONE HCL 0.4 MG/ML
0.04 VIAL (ML) INJECTION AS NEEDED
Status: DISCONTINUED | OUTPATIENT
Start: 2019-08-06 | End: 2019-08-06 | Stop reason: HOSPADM

## 2019-08-06 RX ORDER — METOPROLOL SUCCINATE 25 MG/1
25 TABLET, EXTENDED RELEASE ORAL
Status: DISCONTINUED | OUTPATIENT
Start: 2019-08-06 | End: 2019-08-07 | Stop reason: HOSPADM

## 2019-08-06 RX ORDER — SODIUM CHLORIDE, SODIUM LACTATE, POTASSIUM CHLORIDE, CALCIUM CHLORIDE 600; 310; 30; 20 MG/100ML; MG/100ML; MG/100ML; MG/100ML
1000 INJECTION, SOLUTION INTRAVENOUS CONTINUOUS
Status: DISCONTINUED | OUTPATIENT
Start: 2019-08-06 | End: 2019-08-06

## 2019-08-06 RX ORDER — FENTANYL CITRATE 50 UG/ML
25 INJECTION, SOLUTION INTRAMUSCULAR; INTRAVENOUS AS NEEDED
Status: DISCONTINUED | OUTPATIENT
Start: 2019-08-06 | End: 2019-08-06 | Stop reason: HOSPADM

## 2019-08-06 RX ORDER — OXYCODONE HYDROCHLORIDE AND ACETAMINOPHEN 5; 325 MG/1; MG/1
1 TABLET ORAL EVERY 4 HOURS PRN
Status: DISCONTINUED | OUTPATIENT
Start: 2019-08-06 | End: 2019-08-07 | Stop reason: HOSPADM

## 2019-08-06 RX ORDER — HYDRALAZINE HYDROCHLORIDE 20 MG/ML
5 INJECTION INTRAMUSCULAR; INTRAVENOUS
Status: DISCONTINUED | OUTPATIENT
Start: 2019-08-06 | End: 2019-08-06 | Stop reason: HOSPADM

## 2019-08-06 RX ORDER — TIZANIDINE 4 MG/1
4 TABLET ORAL NIGHTLY PRN
Status: DISCONTINUED | OUTPATIENT
Start: 2019-08-06 | End: 2019-08-07 | Stop reason: HOSPADM

## 2019-08-06 RX ORDER — DEXAMETHASONE SODIUM PHOSPHATE 4 MG/ML
INJECTION, SOLUTION INTRA-ARTICULAR; INTRALESIONAL; INTRAMUSCULAR; INTRAVENOUS; SOFT TISSUE AS NEEDED
Status: DISCONTINUED | OUTPATIENT
Start: 2019-08-06 | End: 2019-08-06 | Stop reason: SURG

## 2019-08-06 RX ORDER — ZINC GLUCONATE 50 MG
50 TABLET ORAL DAILY
Status: DISCONTINUED | OUTPATIENT
Start: 2019-08-06 | End: 2019-08-07 | Stop reason: HOSPADM

## 2019-08-06 RX ORDER — OXYCODONE AND ACETAMINOPHEN 10; 325 MG/1; MG/1
1 TABLET ORAL EVERY 4 HOURS PRN
Status: DISCONTINUED | OUTPATIENT
Start: 2019-08-06 | End: 2019-08-07 | Stop reason: HOSPADM

## 2019-08-06 RX ORDER — OXYCODONE AND ACETAMINOPHEN 10; 325 MG/1; MG/1
1 TABLET ORAL ONCE AS NEEDED
Status: DISCONTINUED | OUTPATIENT
Start: 2019-08-06 | End: 2019-08-06 | Stop reason: HOSPADM

## 2019-08-06 RX ORDER — IPRATROPIUM BROMIDE AND ALBUTEROL SULFATE 2.5; .5 MG/3ML; MG/3ML
3 SOLUTION RESPIRATORY (INHALATION)
Status: DISCONTINUED | OUTPATIENT
Start: 2019-08-06 | End: 2019-08-07 | Stop reason: HOSPADM

## 2019-08-06 RX ORDER — BACLOFEN 10 MG/1
10 TABLET ORAL 4 TIMES DAILY
Status: DISCONTINUED | OUTPATIENT
Start: 2019-08-06 | End: 2019-08-07 | Stop reason: HOSPADM

## 2019-08-06 RX ORDER — BUPIVACAINE HCL/0.9 % NACL/PF 0.1 %
2 PLASTIC BAG, INJECTION (ML) EPIDURAL EVERY 8 HOURS
Status: DISCONTINUED | OUTPATIENT
Start: 2019-08-06 | End: 2019-08-07 | Stop reason: HOSPADM

## 2019-08-06 RX ORDER — SODIUM CHLORIDE 0.9 % (FLUSH) 0.9 %
3-10 SYRINGE (ML) INJECTION AS NEEDED
Status: DISCONTINUED | OUTPATIENT
Start: 2019-08-06 | End: 2019-08-07 | Stop reason: HOSPADM

## 2019-08-06 RX ORDER — AMITRIPTYLINE HYDROCHLORIDE 25 MG/1
25 TABLET, FILM COATED ORAL NIGHTLY
Status: DISCONTINUED | OUTPATIENT
Start: 2019-08-06 | End: 2019-08-07 | Stop reason: HOSPADM

## 2019-08-06 RX ORDER — BUPIVACAINE HCL/0.9 % NACL/PF 0.1 %
2 PLASTIC BAG, INJECTION (ML) EPIDURAL ONCE
Status: COMPLETED | OUTPATIENT
Start: 2019-08-06 | End: 2019-08-06

## 2019-08-06 RX ORDER — PROPOFOL 10 MG/ML
VIAL (ML) INTRAVENOUS AS NEEDED
Status: DISCONTINUED | OUTPATIENT
Start: 2019-08-06 | End: 2019-08-06 | Stop reason: SURG

## 2019-08-06 RX ORDER — SODIUM CHLORIDE 0.9 % (FLUSH) 0.9 %
1-10 SYRINGE (ML) INJECTION AS NEEDED
Status: DISCONTINUED | OUTPATIENT
Start: 2019-08-06 | End: 2019-08-06 | Stop reason: HOSPADM

## 2019-08-06 RX ORDER — ONDANSETRON 4 MG/1
4 TABLET, FILM COATED ORAL EVERY 6 HOURS PRN
Status: DISCONTINUED | OUTPATIENT
Start: 2019-08-06 | End: 2019-08-07 | Stop reason: HOSPADM

## 2019-08-06 RX ORDER — HEPARIN SODIUM 5000 [USP'U]/ML
5000 INJECTION, SOLUTION INTRAVENOUS; SUBCUTANEOUS EVERY 12 HOURS SCHEDULED
Status: DISCONTINUED | OUTPATIENT
Start: 2019-08-06 | End: 2019-08-07 | Stop reason: HOSPADM

## 2019-08-06 RX ORDER — MIDAZOLAM HYDROCHLORIDE 1 MG/ML
2 INJECTION INTRAMUSCULAR; INTRAVENOUS
Status: DISCONTINUED | OUTPATIENT
Start: 2019-08-06 | End: 2019-08-06 | Stop reason: HOSPADM

## 2019-08-06 RX ORDER — ONDANSETRON 2 MG/ML
INJECTION INTRAMUSCULAR; INTRAVENOUS AS NEEDED
Status: DISCONTINUED | OUTPATIENT
Start: 2019-08-06 | End: 2019-08-06 | Stop reason: SURG

## 2019-08-06 RX ORDER — GLYCOPYRROLATE 0.2 MG/ML
INJECTION INTRAMUSCULAR; INTRAVENOUS AS NEEDED
Status: DISCONTINUED | OUTPATIENT
Start: 2019-08-06 | End: 2019-08-06 | Stop reason: SURG

## 2019-08-06 RX ORDER — PANTOPRAZOLE SODIUM 40 MG/1
40 TABLET, DELAYED RELEASE ORAL
Status: DISCONTINUED | OUTPATIENT
Start: 2019-08-07 | End: 2019-08-07 | Stop reason: SDUPTHER

## 2019-08-06 RX ORDER — ALPRAZOLAM 0.5 MG/1
0.5 TABLET ORAL 3 TIMES DAILY PRN
Status: DISCONTINUED | OUTPATIENT
Start: 2019-08-06 | End: 2019-08-07 | Stop reason: HOSPADM

## 2019-08-06 RX ORDER — ACETAMINOPHEN 500 MG
1000 TABLET ORAL ONCE
Status: DISCONTINUED | OUTPATIENT
Start: 2019-08-06 | End: 2019-08-06 | Stop reason: HOSPADM

## 2019-08-06 RX ORDER — ROCURONIUM BROMIDE 10 MG/ML
INJECTION, SOLUTION INTRAVENOUS AS NEEDED
Status: DISCONTINUED | OUTPATIENT
Start: 2019-08-06 | End: 2019-08-06 | Stop reason: SURG

## 2019-08-06 RX ORDER — CETIRIZINE HYDROCHLORIDE 10 MG/1
10 TABLET ORAL DAILY
Status: DISCONTINUED | OUTPATIENT
Start: 2019-08-06 | End: 2019-08-07 | Stop reason: HOSPADM

## 2019-08-06 RX ORDER — SODIUM CHLORIDE 0.9 % (FLUSH) 0.9 %
3 SYRINGE (ML) INJECTION EVERY 12 HOURS SCHEDULED
Status: DISCONTINUED | OUTPATIENT
Start: 2019-08-06 | End: 2019-08-06 | Stop reason: HOSPADM

## 2019-08-06 RX ORDER — SODIUM CHLORIDE, SODIUM LACTATE, POTASSIUM CHLORIDE, CALCIUM CHLORIDE 600; 310; 30; 20 MG/100ML; MG/100ML; MG/100ML; MG/100ML
100 INJECTION, SOLUTION INTRAVENOUS CONTINUOUS
Status: DISCONTINUED | OUTPATIENT
Start: 2019-08-06 | End: 2019-08-06

## 2019-08-06 RX ORDER — GUAIFENESIN 600 MG/1
1200 TABLET, EXTENDED RELEASE ORAL DAILY
Status: DISCONTINUED | OUTPATIENT
Start: 2019-08-06 | End: 2019-08-07 | Stop reason: HOSPADM

## 2019-08-06 RX ORDER — MAGNESIUM HYDROXIDE 1200 MG/15ML
LIQUID ORAL AS NEEDED
Status: DISCONTINUED | OUTPATIENT
Start: 2019-08-06 | End: 2019-08-06 | Stop reason: HOSPADM

## 2019-08-06 RX ORDER — LISINOPRIL 5 MG/1
5 TABLET ORAL DAILY
Status: DISCONTINUED | OUTPATIENT
Start: 2019-08-06 | End: 2019-08-07 | Stop reason: HOSPADM

## 2019-08-06 RX ADMIN — HEPARIN SODIUM 5000 UNITS: 5000 INJECTION INTRAVENOUS; SUBCUTANEOUS at 21:22

## 2019-08-06 RX ADMIN — IPRATROPIUM BROMIDE AND ALBUTEROL SULFATE 3 ML: 2.5; .5 SOLUTION RESPIRATORY (INHALATION) at 21:15

## 2019-08-06 RX ADMIN — SODIUM CHLORIDE, PRESERVATIVE FREE 3 ML: 5 INJECTION INTRAVENOUS at 21:23

## 2019-08-06 RX ADMIN — MIDAZOLAM HYDROCHLORIDE 2 MG: 1 INJECTION, SOLUTION INTRAMUSCULAR; INTRAVENOUS at 14:34

## 2019-08-06 RX ADMIN — BACLOFEN 10 MG: 10 TABLET ORAL at 21:21

## 2019-08-06 RX ADMIN — ESCITALOPRAM 20 MG: 10 TABLET, FILM COATED ORAL at 18:29

## 2019-08-06 RX ADMIN — ALPRAZOLAM 0.5 MG: 0.5 TABLET ORAL at 22:37

## 2019-08-06 RX ADMIN — BACLOFEN 10 MG: 10 TABLET ORAL at 18:29

## 2019-08-06 RX ADMIN — OXYCODONE HYDROCHLORIDE AND ACETAMINOPHEN 500 MG: 500 TABLET ORAL at 18:29

## 2019-08-06 RX ADMIN — DEXAMETHASONE SODIUM PHOSPHATE 4 MG: 4 INJECTION, SOLUTION INTRAMUSCULAR; INTRAVENOUS at 15:44

## 2019-08-06 RX ADMIN — DEXAMETHASONE SODIUM PHOSPHATE 4 MG: 4 INJECTION, SOLUTION INTRAMUSCULAR; INTRAVENOUS at 14:34

## 2019-08-06 RX ADMIN — FENTANYL CITRATE 100 MCG: 50 INJECTION, SOLUTION INTRAMUSCULAR; INTRAVENOUS at 14:43

## 2019-08-06 RX ADMIN — FLUDROCORTISONE ACETATE 0.1 MG: 0.1 TABLET ORAL at 21:21

## 2019-08-06 RX ADMIN — LIDOCAINE HYDROCHLORIDE 100 MG: 20 INJECTION, SOLUTION INFILTRATION; PERINEURAL at 14:44

## 2019-08-06 RX ADMIN — DESMOPRESSIN ACETATE 40 MG: 0.2 TABLET ORAL at 21:21

## 2019-08-06 RX ADMIN — POTASSIUM CHLORIDE 10 MEQ: 10 CAPSULE, COATED, EXTENDED RELEASE ORAL at 18:30

## 2019-08-06 RX ADMIN — ONDANSETRON HYDROCHLORIDE 4 MG: 2 SOLUTION INTRAMUSCULAR; INTRAVENOUS at 15:44

## 2019-08-06 RX ADMIN — LISINOPRIL 5 MG: 5 TABLET ORAL at 18:29

## 2019-08-06 RX ADMIN — SENNOSIDES AND DOCUSATE SODIUM 2 TABLET: 8.6; 5 TABLET ORAL at 21:21

## 2019-08-06 RX ADMIN — LIDOCAINE HYDROCHLORIDE 1 EACH: 40 SOLUTION TOPICAL at 14:45

## 2019-08-06 RX ADMIN — SODIUM CHLORIDE, POTASSIUM CHLORIDE, SODIUM LACTATE AND CALCIUM CHLORIDE 100 ML/HR: 600; 310; 30; 20 INJECTION, SOLUTION INTRAVENOUS at 14:32

## 2019-08-06 RX ADMIN — Medication 3 MG: at 15:44

## 2019-08-06 RX ADMIN — Medication 1 TABLET: at 18:29

## 2019-08-06 RX ADMIN — OXYCODONE HYDROCHLORIDE AND ACETAMINOPHEN 1 TABLET: 5; 325 TABLET ORAL at 18:29

## 2019-08-06 RX ADMIN — CARBAMAZEPINE 200 MG: 200 TABLET ORAL at 21:22

## 2019-08-06 RX ADMIN — PROPOFOL 150 MG: 10 INJECTION, EMULSION INTRAVENOUS at 14:44

## 2019-08-06 RX ADMIN — Medication 2 G: at 14:49

## 2019-08-06 RX ADMIN — GLYCOPYRROLATE 0.4 MG: 0.2 INJECTION, SOLUTION INTRAMUSCULAR; INTRAVENOUS at 15:44

## 2019-08-06 RX ADMIN — Medication 50 MG: at 18:29

## 2019-08-06 RX ADMIN — ROCURONIUM BROMIDE 25 MG: 10 INJECTION INTRAVENOUS at 14:44

## 2019-08-06 RX ADMIN — CEFAZOLIN 2 G: 10 INJECTION, POWDER, FOR SOLUTION INTRAVENOUS; PARENTERAL at 22:36

## 2019-08-06 RX ADMIN — AMITRIPTYLINE HYDROCHLORIDE 25 MG: 25 TABLET, FILM COATED ORAL at 21:21

## 2019-08-06 RX ADMIN — GUAIFENESIN 1200 MG: 600 TABLET, EXTENDED RELEASE ORAL at 18:29

## 2019-08-06 NOTE — OP NOTE
NEUROSURGERY OPERATIVE NOTE    Kathie Lynn  OR Date: 8/6/2019    Pre-op Diagnosis:   Malfunction of intrathecal infusion pump, initial encounter [T85.610A]    Post-op Diagnosis:     Post-Op Diagnosis Codes:     * Malfunction of intrathecal infusion pump, initial encounter [T85.610A]          Surgeon(s):  Preston Abdalla MD    Anesthesia: General    Staff:   Circulator: Raul Bolton, RN  Scrub Person: Natividad Roche; Raul Ng; Leesa Rosales; Jai Castañeda; Amanda Blankenship  Assistant: Erica Lares    Procedure(s):  PAIN PUMP REVISION, left, spine    86771 -implantation or placement of a device for intrathecal or epidural drug infusion; subcutaneous reservoir    INDICATIONS: Kathie Lynn is a 53 y.o. female who presents with pain pump bending nerve life.      Based on these findings, we have decided to perform revision of her Synchromed II intrathecal baclofen pump.    The risks and benefits of the procedure were discussed. The patient accepted and understood all potential risks and complications including overdose, withdrawal,  damage to the spinal cord, fracture of tubing or failure to reimplant new tubing, retained catheter in the spinal canal, bowel or bladder incontinence, difficulty walking or weakness.  After considering options, the patient requested that we proceed with the procedure.    Prior to the procedure the patient had their pain pump refilled by the pain management doctor and settings were returned to the lowest recent setting to prevent overdose.      PROCEDURE: The patient was brought to the operating room and general anesthesia with endotracheal intubation was performed. The patient was positioned supine on a standard operating table. The back was prepped and draped.     The old incision was opened sharply using a #10 blade.  Bovie cautery was used to dissect through old scar and identify the pump.  Care was taken not to Bovie directly on the pump tubing.  Once  fully exposed the pump capsule was entered. This was found to be dry.    A finger was used to free the pump from its capsule taking care not to damage the tubing.  Stabilizing sutures were removed with Metzenbaum scissors.  The old pump was detached from the tubing and brisk backflow CSF was noted.  Approximately 1-1/2 mL or 20 drops were collected and a small amount of tissue was removed from the cap.     On the back table 18 ml of drug was removed from the patient's old pump.  Air bubbles were removed and the total amount was transplanted into the new pump.      The pocket scar was cut along the lateral wall to enlarge it slightly.  The new pump was securely connected to the old tubing and a good fit was ensured.  Again the pocket was checked for strict hemostasis and irrigated with approximately 1 L of bacitracin irrigation.  The old pump was placed into the expanded pocket and the pump was secured in place with 1 Prolene suture.  The pocket scar was closed with 2-0 Vicryl's and the skin incision was closed with 3-0 Vicryl's.  A 4-0 running Monocryl was used to close the epidermis and Skin Affix was applied.  Once dry, Telfa was placed over the incision and an abdominal binder was placed.    Throughout the procedure, there were no intraoperative complications.  All counts were noted to be correct at the end of the case.      Estimated Blood Loss: minimal    Implants:   Implant Name Type Inv. Item Serial No.  Lot No. LRB No. Used   PUMP NEURO PROGRAM SYNCHROMED2 FLTR 20ML - [id number] - [id number] Implant PUMP NEURO PROGRAM SYNCHROMED2 FLTR 20ML [id number] [employer]  Left 1       Specimens:                Specimens     ID Source Type Tests Collected By Collected At Frozen?      1 Specimen Not Sent Specimen Not Sent · SPECIMEN NOT SENT   Preston Abdalla MD 8/6/19 4548      Description: old pump not sent per md.            Drains:   Gastrostomy/Enterostomy Gastrostomy LUQ (Active)   Surrounding  Skin Non reddened 8/6/2019  2:04 PM   Drain Status Clamped 8/6/2019  2:04 PM   Dressing Status Clean;Dry;Intact 8/6/2019  2:04 PM   Dressing Type Dry dressing;Split gauze 8/6/2019  2:04 PM

## 2019-08-06 NOTE — PROGRESS NOTES
"Neurosurgery Daily Progress Note    Assessment:   Kathie Lynn is a 53 y.o. with a significant comorbidity severe spinal cord injury and cervical fusion. She presents with a known problem of analgesic pump reaching end of life. Physical exam findings of age a cervical quadriplegia.  Her imaging shows volume loss in her bilateral frontal lobes and prior spinal fusion.      DDX:     Malfunction of intrathecal infusion pump    Patient Active Problem List   Diagnosis   • Malfunction of intrathecal infusion pump   • Chronic pain syndrome   • Quadriplegia (CMS/HCC)   • BMI 22.0-22.9, adult   • Cigarette smoker     Plan:   Neuro: Stable.  Neuro at baseline   S/P intrathecal pump revision /replacement   Neurochecks every 4   Abdominal binder on at all times  CV: JENNIFER.  Pulm: JENNIFER.  Maintaining O2 sat.  Continuous pulse oximetry.  Incentive spirometry  : Requires intermittent I/O caths to void  FEN: JENNIFER.  Requires thickened liquids and puréed oral diet  GI: JENNIFER.  ID: 23-hour postoperative prophylactic antibiotics  Heme:  DVT prophylaxis; SCDs  Pain: JENNIFER.  Tolerable at present  Dispo: Pending completion of antibiotics    Chief complaint:   None    Subjective  Symptoms stable.  Doing well    Temp:  [97.2 °F (36.2 °C)-97.6 °F (36.4 °C)] 97.2 °F (36.2 °C)  Heart Rate:  [64-80] 72  Resp:  [14-16] 14  BP: ()/(53-82) 99/57    Objective:  General Appearance:  Comfortable, well-appearing, in no acute distress and not in pain.    Vital signs: (most recent): Blood pressure 99/57, pulse 72, temperature 97.2 °F (36.2 °C), temperature source Temporal, resp. rate 14, height 160 cm (63\"), weight 59.2 kg (130 lb 8.2 oz), SpO2 93 %, not currently breastfeeding.      Neurologic Exam     Mental Status   Oriented to person, place, and time.   Speech: speech is normal     Alert and awake.  Follow simple commands.     Cranial Nerves     CN II   Visual fields full to confrontation.     CN III, IV, VI   Pupils are equal, round, and reactive " to light.  Extraocular motions are normal.     CN V   Right facial sensation deficit: none  Left facial sensation deficit: none    CN VII   Facial expression full, symmetric.     CN VIII   Hearing: intact    CN IX, X   Palate: symmetric    CN XI   Right sternocleidomastoid strength: normal  Left sternocleidomastoid strength: normal    CN XII   Tongue deviation: none    Motor Exam     Strength   Strength 5/5 throughout.   Right biceps: 4/5  Left biceps: 4/5  Right triceps: 2/5  Left triceps: 2/5  Right wrist flexion: 2/5  Left wrist flexion: 2/5  Right interossei: 2/5  Left interossei: 2/5  Right iliopsoas: 0/5  Left iliopsoas: 0/5  Right quadriceps: 0/5  Left quadriceps: 0/5  Right hamstrin/5  Left hamstrin/5  Right anterior tibial: 0/5  Left anterior tibial: 0/5  Right gastroc: 0/5  Left gastroc: 0/5    Sensory Exam   Right arm light touch: normal  Left arm light touch: normal  Lowest sensation to pinprick on right: C7  Lowest sensation to pinprick on left: C7  Lowest sensation to vibration on right: C7  Lowest sensation to vibration on left: C7    Gait, Coordination, and Reflexes     Gait  Gait: (Wheelchair-bound)    Reflexes   Reflexes 2+ except as noted.     Drains:   Gastrostomy/Enterostomy Gastrostomy LUQ (Active)   Surrounding Skin Dry;Intact;Non reddened 2019  4:25 PM   Drain Status Clamped 2019  4:25 PM   Dressing Status Clean;Dry;Intact 2019  2:04 PM   Dressing Type Dry dressing;Split gauze 2019  2:04 PM     Imaging Results (last 24 hours)     ** No results found for the last 24 hours. **        Lab Results (last 24 hours)     ** No results found for the last 24 hours. **        [id number]  Bartolo Ndiaye, APRN
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
## 2019-08-06 NOTE — NURSING NOTE
19 ml drug removed from old pump, placed in new pump, witnessed by ANTOINE Bolton RN, CARLOS Roche CST, Dr. Abdalla.

## 2019-08-06 NOTE — ANESTHESIA PROCEDURE NOTES
Airway  Urgency: elective    Airway not difficult    General Information and Staff    Patient location during procedure: OR  CRNA: Real Vidal CRNA    Indications and Patient Condition  Indications for airway management: airway protection    Preoxygenated: yes  Mask difficulty assessment: 1 - vent by mask    Final Airway Details  Final airway type: endotracheal airway      Successful airway: ETT  Cuffed: yes   Successful intubation technique: direct laryngoscopy  Facilitating devices/methods: cricoid pressure  Endotracheal tube insertion site: oral  Blade: Starkey  Blade size: 2  ETT size (mm): 7.5  Cormack-Lehane Classification: grade IIa - partial view of glottis  Placement verified by: chest auscultation and capnometry   Cuff volume (mL): 8  Measured from: teeth  ETT to teeth (cm): 21  Number of attempts at approach: 1

## 2019-08-06 NOTE — ANESTHESIA POSTPROCEDURE EVALUATION
"Patient: Kathie NEAL Lynn    Procedure Summary     Date:  08/06/19 Room / Location:   [facility]  /  [facility]    Anesthesia Start:  1438 Anesthesia Stop:  1557    Procedure:  PAIN PUMP REVISION, left, spine (Left ) Diagnosis:       Malfunction of intrathecal infusion pump, initial encounter      (Malfunction of intrathecal infusion pump, initial encounter [T85.610A])    Surgeon:  Preston Abdalla MD Provider:  Real Vidal CRNA    Anesthesia Type:  general ASA Status:  3          Anesthesia Type: general  Last vitals  BP   147/74 (08/06/19 1700)   Temp   97.6 °F (36.4 °C) (08/06/19 1700)   Pulse   67 (08/06/19 1700)   Resp   16 (08/06/19 1700)     SpO2   100 % (08/06/19 1700)     Post Anesthesia Care and Evaluation    Patient location during evaluation: PACU  Patient participation: complete - patient participated  Level of consciousness: awake and alert  Pain management: adequate  Airway patency: patent  Anesthetic complications: No anesthetic complications  PONV Status: none  Cardiovascular status: acceptable and hemodynamically stable  Respiratory status: acceptable  Hydration status: acceptable    Comments: Blood pressure 147/74, pulse 67, temperature 97.6 °F (36.4 °C), temperature source Oral, resp. rate 16, height 160 cm (63\"), weight 59.2 kg (130 lb 8.2 oz), SpO2 100 %, not currently breastfeeding.    Patient discharged from PACU based upon Brian score. Please see RN notes for further details      "

## 2019-08-07 VITALS
DIASTOLIC BLOOD PRESSURE: 99 MMHG | BODY MASS INDEX: 23.12 KG/M2 | HEIGHT: 63 IN | HEART RATE: 91 BPM | SYSTOLIC BLOOD PRESSURE: 160 MMHG | OXYGEN SATURATION: 99 % | TEMPERATURE: 98.2 F | RESPIRATION RATE: 18 BRPM | WEIGHT: 130.51 LBS

## 2019-08-07 PROCEDURE — A9270 NON-COVERED ITEM OR SERVICE: HCPCS | Performed by: NURSE PRACTITIONER

## 2019-08-07 PROCEDURE — 63710000001 ZINC GLUCONATE 50 MG TABLET: Performed by: NURSE PRACTITIONER

## 2019-08-07 PROCEDURE — 25010000002 CEFAZOLIN PER 500 MG: Performed by: NURSE PRACTITIONER

## 2019-08-07 PROCEDURE — 63710000001 CARBAMAZEPINE 200 MG TABLET: Performed by: NURSE PRACTITIONER

## 2019-08-07 PROCEDURE — 63710000001 PREGABALIN 75 MG CAPSULE: Performed by: NURSE PRACTITIONER

## 2019-08-07 PROCEDURE — 63710000001 METOPROLOL SUCCINATE XL 25 MG TABLET SUSTAINED-RELEASE 24 HOUR: Performed by: NURSE PRACTITIONER

## 2019-08-07 PROCEDURE — 63710000001 ESCITALOPRAM 10 MG TABLET: Performed by: NURSE PRACTITIONER

## 2019-08-07 PROCEDURE — 63710000001 CALCIUM CARB-CHOLECALCIFEROL 600-800 MG-UNIT TABLET: Performed by: NURSE PRACTITIONER

## 2019-08-07 PROCEDURE — P9612 CATHETERIZE FOR URINE SPEC: HCPCS

## 2019-08-07 PROCEDURE — 99024 POSTOP FOLLOW-UP VISIT: CPT | Performed by: NURSE PRACTITIONER

## 2019-08-07 PROCEDURE — 63710000001 OXYCODONE-ACETAMINOPHEN 5-325 MG TABLET: Performed by: NURSE PRACTITIONER

## 2019-08-07 PROCEDURE — A9270 NON-COVERED ITEM OR SERVICE: HCPCS | Performed by: NEUROLOGICAL SURGERY

## 2019-08-07 PROCEDURE — 63710000001 GUAIFENESIN 600 MG TABLET SUSTAINED-RELEASE 12 HOUR: Performed by: NURSE PRACTITIONER

## 2019-08-07 PROCEDURE — 63710000001 LANSOPRAZOLE 3 MG/ML SUSPENSION: Performed by: NEUROLOGICAL SURGERY

## 2019-08-07 PROCEDURE — 63710000001 BACLOFEN 10 MG TABLET: Performed by: NURSE PRACTITIONER

## 2019-08-07 PROCEDURE — 94799 UNLISTED PULMONARY SVC/PX: CPT

## 2019-08-07 PROCEDURE — 63710000001 MIDODRINE 2.5 MG TABLET: Performed by: NURSE PRACTITIONER

## 2019-08-07 PROCEDURE — 63710000001 LISINOPRIL 5 MG TABLET: Performed by: NURSE PRACTITIONER

## 2019-08-07 PROCEDURE — 63710000001 CETIRIZINE 10 MG TABLET: Performed by: NURSE PRACTITIONER

## 2019-08-07 PROCEDURE — 63710000001 VITAMIN C 500 MG TABLET: Performed by: NURSE PRACTITIONER

## 2019-08-07 PROCEDURE — 63710000001 ALPRAZOLAM 0.5 MG TABLET: Performed by: NURSE PRACTITIONER

## 2019-08-07 PROCEDURE — 25010000002 HEPARIN (PORCINE) PER 1000 UNITS: Performed by: NURSE PRACTITIONER

## 2019-08-07 PROCEDURE — 25010000002 ONDANSETRON PER 1 MG: Performed by: NURSE PRACTITIONER

## 2019-08-07 PROCEDURE — 63710000001 POTASSIUM CHLORIDE 10 MEQ CAPSULE CONTROLLED-RELEASE: Performed by: NURSE PRACTITIONER

## 2019-08-07 PROCEDURE — 63710000001 FLUDROCORTISONE 0.1 MG TABLET: Performed by: NURSE PRACTITIONER

## 2019-08-07 RX ORDER — LANSOPRAZOLE
30 KIT
Status: DISCONTINUED | OUTPATIENT
Start: 2019-08-07 | End: 2019-08-07 | Stop reason: HOSPADM

## 2019-08-07 RX ADMIN — OXYCODONE HYDROCHLORIDE AND ACETAMINOPHEN 500 MG: 500 TABLET ORAL at 08:39

## 2019-08-07 RX ADMIN — CETIRIZINE HYDROCHLORIDE 10 MG: 10 TABLET, FILM COATED ORAL at 08:35

## 2019-08-07 RX ADMIN — ALPRAZOLAM 0.5 MG: 0.5 TABLET ORAL at 08:35

## 2019-08-07 RX ADMIN — OXYCODONE HYDROCHLORIDE AND ACETAMINOPHEN 1 TABLET: 5; 325 TABLET ORAL at 08:34

## 2019-08-07 RX ADMIN — SODIUM CHLORIDE, PRESERVATIVE FREE 3 ML: 5 INJECTION INTRAVENOUS at 08:42

## 2019-08-07 RX ADMIN — ONDANSETRON HYDROCHLORIDE 4 MG: 2 SOLUTION INTRAMUSCULAR; INTRAVENOUS at 07:18

## 2019-08-07 RX ADMIN — IPRATROPIUM BROMIDE AND ALBUTEROL SULFATE 3 ML: 2.5; .5 SOLUTION RESPIRATORY (INHALATION) at 07:24

## 2019-08-07 RX ADMIN — LISINOPRIL 5 MG: 5 TABLET ORAL at 08:38

## 2019-08-07 RX ADMIN — HEPARIN SODIUM 5000 UNITS: 5000 INJECTION INTRAVENOUS; SUBCUTANEOUS at 08:39

## 2019-08-07 RX ADMIN — METOPROLOL SUCCINATE 25 MG: 25 TABLET, FILM COATED, EXTENDED RELEASE ORAL at 08:38

## 2019-08-07 RX ADMIN — BACLOFEN 10 MG: 10 TABLET ORAL at 08:35

## 2019-08-07 RX ADMIN — ESCITALOPRAM 20 MG: 10 TABLET, FILM COATED ORAL at 08:36

## 2019-08-07 RX ADMIN — CARBAMAZEPINE 200 MG: 200 TABLET ORAL at 08:35

## 2019-08-07 RX ADMIN — FLUDROCORTISONE ACETATE 0.1 MG: 0.1 TABLET ORAL at 08:36

## 2019-08-07 RX ADMIN — GUAIFENESIN 1200 MG: 600 TABLET, EXTENDED RELEASE ORAL at 08:38

## 2019-08-07 RX ADMIN — POTASSIUM CHLORIDE 10 MEQ: 10 CAPSULE, COATED, EXTENDED RELEASE ORAL at 08:39

## 2019-08-07 RX ADMIN — PREGABALIN 150 MG: 75 CAPSULE ORAL at 08:39

## 2019-08-07 RX ADMIN — CEFAZOLIN 2 G: 10 INJECTION, POWDER, FOR SOLUTION INTRAVENOUS; PARENTERAL at 06:22

## 2019-08-07 RX ADMIN — MIDODRINE HYDROCHLORIDE 5 MG: 2.5 TABLET ORAL at 08:35

## 2019-08-07 RX ADMIN — Medication 1 TABLET: at 08:35

## 2019-08-07 RX ADMIN — LANSOPRAZOLE 30 MG: KIT at 05:39

## 2019-08-07 RX ADMIN — Medication 50 MG: at 08:39

## 2019-08-07 NOTE — DISCHARGE INSTRUCTIONS
[facility] Neurosurgery    Postoperative care following insertion and/or revision of implantable device  Dear Patient,  You have recently undergone surgery (intrathecal pump revision) and are now ready to go home. These written instructions are intended to help you to recover quickly.  • If you have ANY QUESTIONS about your condition prior to discharge please ask Dr. Abdalla. In particular, if you have concerns about going home discuss them now. We do not want you to go until you are completely comfortable leaving the hospital.   • If you have ANY QUESTIONS about your condition after you go home call your doctor. The number is [phone] which is answered 24 hours a day. During regular working hours a  will connect you to your doctor, one of his partners, or one of our nurses. At night or on weekends the answering service will connect you with the physician on call. DO NOT HESITATE to call. We want to help you with any problems.     Deep vein thrombosis/ pulmonary embolus  Some patients who undergo surgery develop blood clots in the veins of the legs. These clots can cause pain or swelling in the legs, or may cause no obvious problem. They can break free from the legs and travel to the lungs causing shortness of breath and/or chest pain.you develop pain or swelling in your legs after surgery, call your doctor. If you develop breathing problems or chest pain after surgery, call [phone].    Neurological Deficit  Neurological deficits are problems with brain function like speech difficulty, weakness, numbness, imbalance, etc. These deficits may be present before or after spine surgery. Prior to discharge your doctor will make sure that all treatment needed to help you recover from such deficits has been instituted. He will also make sure that these deficits are stable or improving. After you go home, if you think any of your spine problems are getting worse, not better, it may be a sign of bleeding,  infection, or other problems. Call your doctor. He will order tests and prescribe treatment as needed.    Activity Restrictions  In general after surgery you will be on restricted activities for several weeks to several months depending on the nature of your operation. For six weeks you should avoid heavy lifting (over 8 lbs or roughly a gallon of milk), bending, or stooping. You may be released by Dr. Abdalla earlier. You may return to driving when you feel comfortable enough that you can safely handle your vehicle AND you are not taking narcotic pain medications. This may be as early as 10 - 14 days, but could be longer as instructed by your neurosurgeon. After surgery you may gradually increase your activities, as you are able to tolerate. Walking is an excellent low impact exercise to begin after surgery. You should try to make regular walks of 15 to 30 minutes part of your postoperative recovery as you become able to do so. It typically requires a period of days to a few weeks to reach this level of activity and should be done in a gradual fashion. Your return to work will depend on your job requirements. In general, you may return to light duty work as soon as you feel comfortable and are not taking routine narcotic pain medications.    Medication  It is important to take your medication EXACTLY as prescribed. Some patients are reluctant to take pain medication. It is perfectly fine to take pain medication for several weeks after surgery. We want to eliminate pain whenever possible. Many pain medications can cause nausea (sick to your stomach), constipation (inability to poop), or itching. Nausea may be minimized by taking the medication with food. Constipation can be relieved by taking stool softeners and/ or laxatives that you can purchase over the counter as needed.    It is important to realize that no pain after surgery is an unrealistic expectation.  Pain medication will never reduce your pain score to  zero.  The goal of pain medicine is to reduce your pain to the point you can move, take care of yourself, and participate in therapy.  Make sure to work with your caregiver to determine what is an adequate level of pain control to promote healthy movement and then take your medication to reach this goal.      If required, please taper your pain medications to avoid long term addiction.  Week 1: Take your pain medication no more than ever 4 hours as needed for severe pain.  Week 2: Take your pain medication no more than ever 6 hours as needed for severe pain.  Week 3: Take your pain medication no more than ever 8 hours as needed for severe pain.  Week 4: Take your pain medication no more than ever 12 hours as needed for severe pain.  Week 5: Take your pain medication no more than ever 24 hours as needed for severe pain.  By Week 6 you should be off of all pain medication.     Wound Care  Your incision is held together with dissolvable sutures that do not need to be removed.     Seventy-two hours after surgery it is OK to get the wound wet, so you can take a shower or bath.     You do not need to put any medication (like Neosporin or Vitamin E) on the wound. Scrubbing the wound should be avoided until the staples nondissolvable sutures come out.     Heating pads have the potential to cause very serious burns, especially in patients using narcotic pain medications (e.g. Oxycodone, Oxycontin, etc.) Do not use heating pads during your recovery.      No nicotine products, including second-hand smoke, gum or patches. Nicotine will delay healing and increase the likelihood of a surgical complication. For help quitting, call the Quitline: 1-424.625.6736     Potential wound problems include the following:  • Infection--If the wound becomes red, tender, swollen, or warm it may be infected. Infection is often accompanied by fever. If you think your wound might be infected you should call your doctor. Often you can send us a  picture of the wound so we can better evaluate it.   • Drainage--Fluid should not drain from your wound. If it does, call your doctor. Colored fluid may indicate infection. Clear fluid may indicate leakage of spinal fluid.   • Dehiscence--If the wound does not heal properly it may open up along the staple line. This is called dehiscence. Call us immediately.   • Sutures--Occasionally, one of the buried threads (sutures) may work through the skin. If you think this has happened call your doctor.   • Swelling--Spinal fluid or blood may collect under the skin. This is usually harmless, but needs to be evaluated. Call your doctor.     How to contact your doctor  Dr. Abdalla and his team did your surgery and, therefore, are likely to know more about your condition than any other physicians. We are immediately available to help you with any problems after surgery. Please call us for any concerns at the following numbers:  • Doctor’s office:  [phone] (answered 24 hours a day)   • [facility] : [phone] (alternative emergency number for on-call neurosurgeon)     Specific instructions related to your surgery  Diet: no restrictions, eat a heart healthy diet. If you are diabetic, tightly controlling your blood sugar over the next 2 weeks is crucial in controlling infection.     Activity: as tolerated, no heavy lifting or strenuous exercise for at least 2 weeks.    Brace/collar instructions: Continue to wear abdominal binder until follow-up appointment.    Follow up:   Follow up with with Bartolo GALVAN in 2 weeks for post op wound check in the neurosurgery clinic ([phone]). We will schedule this appointment for you.            Sincerely,        Jamie Abdalla MD, PhD, MPH

## 2019-08-07 NOTE — DISCHARGE SUMMARY
Date of Discharge:  8/7/2019    Discharge Diagnosis:   Patient Active Problem List   Diagnosis   • Malfunction of intrathecal infusion pump   • Chronic pain syndrome   • Quadriplegia (CMS/[facility])   • BMI 22.0-22.9, adult   • Cigarette smoker     1. Malfunction of intrathecal infusion pump, initial encounter      Presenting Problem/History of Present Illness  Malfunction of intrathecal infusion pump, initial encounter [T85.610A]  Malfunction of intrathecal infusion pump, initial encounter [T85.610A]  Malfunction of intrathecal infusion pump, initial encounter [T85.610A]   1. Malfunction of intrathecal infusion pump, initial encounter      Hospital Course  Patient is a 53 y.o. female presented with a significant comorbidity severe spinal cord injury and cervical fusion. She presents with a known problem of analgesic pump reaching end of life. Physical exam findings of age a cervical quadriplegia.  Her imaging shows volume loss in her bilateral frontal lobes and prior spinal fusion.    On 8/7/19 the patient was brought to the main operating room where she underwent an uneventful PAIN PUMP REVISION, left, spine per Dr. Abdalla.  Postoperatively she did extremely well and her neurological exam was unchanged.  She was kept overnight/ in the hospital for close neurologic monitoring, airway observation, pain control, and intravenous prophylactic antibiotics.  She has remained neurologically at baseline, and deemed safe for discharge home.  She will be discharged home with family.  Additional instructions provided.     Procedures Performed  Procedure(s):  PAIN PUMP REVISION, left, spine     Consults:   Consults     No orders found for last 30 day(s).        Pertinent Test Results: No radiology results for the last 30 days.    Condition on Discharge:  Stable    Vital Signs  Temp:  [97.2 °F (36.2 °C)-98.5 °F (36.9 °C)] 98.2 °F (36.8 °C)  Heart Rate:  [59-91] 91  Resp:  [14-18] 18  BP: ()/(53-99) 160/99    Physical  Exam:   Physical Exam   Constitutional: She is oriented to person, place, and time. She appears well-developed and well-nourished.  Non-toxic appearance. She does not have a sickly appearance. She does not appear ill. No distress.   HENT:   Head: Normocephalic and atraumatic.   Right Ear: Hearing normal.   Left Ear: Hearing normal.   Mouth/Throat: Mucous membranes are normal.   Eyes: Conjunctivae and EOM are normal. Pupils are equal, round, and reactive to light.   Neck: Trachea normal and full passive range of motion without pain. Neck supple.   Cardiovascular: Normal rate and regular rhythm.   Pulmonary/Chest: Effort normal. No accessory muscle usage. No apnea, no tachypnea and no bradypnea. No respiratory distress.   Abdominal: Soft. Normal appearance.   Neurological: She is alert and oriented to person, place, and time. She has normal strength.   Skin: Skin is warm, dry and intact.   Psychiatric: She has a normal mood and affect. Her speech is normal and behavior is normal.   Nursing note and vitals reviewed.       Neurologic Exam     Mental Status   Oriented to person, place, and time.   Speech: speech is normal     Alert and awake.  Follow simple commands.     Cranial Nerves     CN II   Visual fields full to confrontation.     CN III, IV, VI   Pupils are equal, round, and reactive to light.  Extraocular motions are normal.     CN V   Right facial sensation deficit: none  Left facial sensation deficit: none    CN VII   Facial expression full, symmetric.     CN VIII   Hearing: intact    CN IX, X   Palate: symmetric    CN XI   Right sternocleidomastoid strength: normal  Left sternocleidomastoid strength: normal    CN XII   Tongue deviation: none    Motor Exam     Strength   Strength 5/5 throughout.   Right biceps: 4/5  Left biceps: 4/5  Right triceps: 2/5  Left triceps: 2/5  Right wrist flexion: 2/5  Left wrist flexion: 2/5  Right interossei: 2/5  Left interossei: 2/5  Right iliopsoas: 0/5  Left iliopsoas:  0/5  Right quadriceps: 0/5  Left quadriceps: 0/5  Right hamstrin/5  Left hamstrin/5  Right anterior tibial: 0/5  Left anterior tibial: 0/5  Right gastroc: 0/5  Left gastroc: 0/5    Sensory Exam   Right arm light touch: normal  Left arm light touch: normal  Lowest sensation to pinprick on right: C7  Lowest sensation to pinprick on left: C7  Lowest sensation to vibration on right: C7  Lowest sensation to vibration on left: C7    Gait, Coordination, and Reflexes     Gait  Gait: (Wheelchair-bound)    Reflexes   Reflexes 2+ except as noted.     Incision: Clean, dry, and intact    Discharge Disposition  Home or Self Care    Discharge Medications     Discharge Medications      Continue These Medications      Instructions Start Date   albuterol 0.63 MG/3ML nebulizer solution  Commonly known as:  ACCUNEB   Take 1 ampule by nebulization every 6 hours as needed for Wheezing      ALPRAZolam 0.5 MG tablet  Commonly known as:  XANAX   0.5 mg, Oral, 3 Times Daily PRN      amitriptyline 25 MG tablet  Commonly known as:  ELAVIL   25 mg, Oral, Nightly      ascorbic acid 500 MG tablet  Commonly known as:  VITAMIN C   500 mg, Oral, Daily      aspirin 81 MG tablet   81 mg, Oral, Daily      atorvastatin 40 MG tablet  Commonly known as:  LIPITOR   TAKE ONE TABLET BY MOUTH AT BEDTIME      baclofen 10 MG tablet  Commonly known as:  LIORESAL   10 mg, Oral, 4 Times Daily      calcium carbonate-vitamin d 600-400 MG-UNIT per tablet   1 tablet, Oral, Daily      carBAMazepine 200 MG tablet  Commonly known as:  TEGretol   200 mg, Oral, 3 Times Daily      cyanocobalamin 1000 MCG/ML injection   1,000 mcg, Intramuscular, Every 28 Days      escitalopram 20 MG tablet  Commonly known as:  LEXAPRO   20 mg, Oral, Daily      fludrocortisone 0.1 MG tablet   0.1 mg, Oral, 3 Times Daily      furosemide 20 MG tablet  Commonly known as:  LASIX   TAKE 1 & 1\2 TABLETS BY MOUTH EVERY MORNING & TAKE 1\2 TABLET EVERYEVENING      guaiFENesin 600 MG 12 hr  tablet  Commonly known as:  MUCINEX   1,200 mg, Oral, Daily      ipratropium-albuterol  MCG/ACT inhaler  Commonly known as:  COMBIVENT RESPIMAT   1 puff, Inhalation, 4 Times Daily - RT      levocetirizine 5 MG tablet  Commonly known as:  XYZAL   5 mg, Oral, Every Evening      lisinopril 5 MG tablet  Commonly known as:  PRINIVIL,ZESTRIL   5 mg, Oral, Daily      LYRICA 150 MG capsule  Generic drug:  pregabalin   150 mg, Oral, Daily      metoprolol succinate XL 25 MG 24 hr tablet  Commonly known as:  TOPROL-XL   TAKE ONE TABLET BY MOUTH DAILY -TAKE WITH FOOD OR MILK      midodrine 5 MG tablet  Commonly known as:  PROAMATINE   5 mg, Oral, 3 Times Daily      NEXIUM 40 MG packet  Generic drug:  esomeprazole   40 mg, Oral, Every Morning Before Breakfast      NITROSTAT 0.4 MG SL tablet  Generic drug:  nitroglycerin   0.4 mg, Sublingual      ondansetron ODT 4 MG disintegrating tablet  Commonly known as:  ZOFRAN-ODT   4 mg, Oral, Every 12 Hours PRN      oxyCODONE-acetaminophen  MG per tablet  Commonly known as:  PERCOCET   1 tablet, Oral, Every 6 Hours PRN      potassium chloride 10 MEQ CR capsule  Commonly known as:  MICRO-K   10 mEq, Oral, Daily      sodium chloride 0.9 % irrigation  Commonly known as:  NS   1,000 mL, Irrigation, Once      tiZANidine 4 MG tablet  Commonly known as:  ZANAFLEX   4 mg, Oral, Nightly PRN      Zinc 50 MG tablet   1 tablet, Oral, Daily           Discharge Diet:   Diet Instructions     Advance Diet As Tolerated           Activity at Discharge:   Activity Instructions     Activity as Tolerated      Bathing Restrictions      May resume shower in 48 hours.  No tub bathing.    Type of Restriction:  Bathing    Bathing Restrictions:   No Tub Bath  No Shower            Follow-up Appointments  Future Appointments   Date Time Provider Department Center   8/21/2019  3:15 PM Bartolo Ndiaye APRN [facility] None     Additional Instructions for the Follow-ups that You Need to Schedule     Discharge  Follow-up with Specified Provider: Bartolo GALVAN; 2 Weeks   As directed      To:  Bartolo GALVAN    Follow Up:  2 Weeks    Follow Up Details:  wound check             Test Results Pending at Discharge     COLE Martin  08/07/19  8:46 AM    Time: Discharge 35 min

## 2019-08-07 NOTE — PLAN OF CARE
Problem: Patient Care Overview  Goal: Plan of Care Review  Outcome: Ongoing (interventions implemented as appropriate)   08/07/19 7382   Coping/Psychosocial   Plan of Care Reviewed With patient   Plan of Care Review   Progress improving   OTHER   Outcome Summary Pt requested PRN Xanax last night. She states that she takes it on a schedule at home. A&O x4. Cathed osotomy with 400mL output. 3L O2 via NC,  and SCDs in place. Chronic pressure ulcer to coccyx packed; HOPE-pt wishes. VSS.        Problem: Surgery Nonspecified (Adult)  Goal: Signs and Symptoms of Listed Potential Problems Will be Absent, Minimized or Managed (Surgery Nonspecified)  Outcome: Ongoing (interventions implemented as appropriate)    Goal: Anesthesia/Sedation Recovery  Outcome: Ongoing (interventions implemented as appropriate)

## 2019-08-07 NOTE — PLAN OF CARE
Problem: Patient Care Overview  Goal: Plan of Care Review  Outcome: Ongoing (interventions implemented as appropriate)   08/07/19 1100   Coping/Psychosocial   Plan of Care Reviewed With patient   Plan of Care Review   Progress improving   OTHER   Outcome Summary Pt a& o x4. Minimal c/o pain, given prn pain meds with good relief. I & o cath'd via ostomy. VSS. Will cont to monitor.        Problem: Surgery Nonspecified (Adult)  Goal: Signs and Symptoms of Listed Potential Problems Will be Absent, Minimized or Managed (Surgery Nonspecified)  Outcome: Ongoing (interventions implemented as appropriate)    Goal: Anesthesia/Sedation Recovery  Outcome: Ongoing (interventions implemented as appropriate)

## 2019-08-15 NOTE — PROGRESS NOTES
Chief complaint:   Chief Complaint   Patient presents with   • Post-op     Post op follow up for pump placement.      Subjective     HPI:   Interval History: Kathie Lynn is a 53 y.o.  female who presents today for post operative follow-up from a PAIN PUMP REVISION, left, spine - Left on 2019 per Dr. Abdalla.  Compliant with abdominal binder.  No complaints of pain near or around incisional site.  No concerns for postoperative incisional infection.  No additional concerns at this time.    PFSH:  Past Medical History:   Diagnosis Date   • Anxiety    • Burst fracture of cervical vertebra (CMS/[facility])    • Calculus of bladder    • Calculus of kidney    • Cigarette smoker    • COPD (chronic obstructive pulmonary disease) (CMS/[facility])    • Depression    • Dysuria    • Hypertension    • MI (myocardial infarction) (CMS/[facility])    • Neurogenic bladder    • Pressure ulcer     SACRAL AREA   • Spinal cord injury, cervical region (CMS/[facility])    • UTI (urinary tract infection)      Past Surgical History:   Procedure Laterality Date   • AUGMENTATION MAMMAPLASTY     • BLADDER AUGMENTATION     • CERVICAL SPINE POSTERIOR      C6-C7 by Dr. Hull - [facility]   •  SECTION     • COCCYX FRACTURE SURGERY     • COLONOSCOPY  2008   • GASTROSTOMY FEEDING TUBE INSERTION     • PAIN PUMP INSERTION/REVISION  2012    Performed by Dr. David Blanc   • PAIN PUMP INSERTION/REVISION Left 2019    Procedure: PAIN PUMP REVISION, left, spine;  Surgeon: Preston Abdalla MD;  Location: [facility] OR;  Service: Neurosurgery   • ULNAR NERVE DECOMPRESSION Bilateral     Performed by Dr. David Blanc      Objective      Current Outpatient Medications   Medication Sig Dispense Refill   • ADVAIR DISKUS 250-50 MCG/DOSE DISKUS      • albuterol (ACCUNEB) 0.63 MG/3ML nebulizer solution Take 1 ampule by nebulization every 6 hours as needed for Wheezing     • ALPRAZolam (XANAX) 0.5 MG tablet Take 0.5 mg by mouth 3 (Three) Times  a Day As Needed.     • amitriptyline (ELAVIL) 25 MG tablet Take 25 mg by mouth every night.     • ascorbic acid (VITAMIN C) 500 MG tablet Take 500 mg by mouth Daily.     • aspirin 81 MG tablet Take 81 mg by mouth Daily.     • atorvastatin (LIPITOR) 40 MG tablet TAKE ONE TABLET BY MOUTH AT BEDTIME 30 tablet 2   • baclofen (LIORESAL) 10 MG tablet Take 10 mg by mouth 4 (Four) Times a Day.     • calcium carbonate-vitamin d 600-400 MG-UNIT per tablet Take 1 tablet by mouth Daily.     • carBAMazepine (TEGretol) 200 MG tablet Take 200 mg by mouth 3 (three) times a day.     • celecoxib (CeleBREX) 200 MG capsule      • cyanocobalamin 1000 MCG/ML injection Inject 1,000 mcg into the appropriate muscle as directed by prescriber Every 28 (Twenty-Eight) Days.     • escitalopram (LEXAPRO) 20 MG tablet Take 20 mg by mouth Daily.     • esomeprazole (nexIUM) 40 MG capsule      • fludrocortisone 0.1 MG tablet Take 0.1 mg by mouth 3 (Three) Times a Day.     • furosemide (LASIX) 20 MG tablet TAKE 1 & 1\2 TABLETS BY MOUTH EVERY MORNING & TAKE 1\2 TABLET EVERYEVENING 60 tablet 5   • guaiFENesin (MUCINEX) 600 MG 12 hr tablet Take 1,200 mg by mouth Daily.     • ipratropium-albuterol (COMBIVENT RESPIMAT)  MCG/ACT inhaler Inhale 1 puff 4 (Four) Times a Day.     • levocetirizine (XYZAL) 5 MG tablet Take 5 mg by mouth Every Evening.     • lisinopril (PRINIVIL,ZESTRIL) 5 MG tablet Take 5 mg by mouth Daily.     • LYRICA 150 MG capsule Take 150 mg by mouth Daily.     • metoprolol succinate XL (TOPROL-XL) 25 MG 24 hr tablet TAKE ONE TABLET BY MOUTH DAILY -TAKE WITH FOOD OR MILK 30 tablet 0   • midodrine (PROAMATINE) 5 MG tablet Take 5 mg by mouth 3 (Three) Times a Day.     • NEXIUM 40 MG packet Take 40 mg by mouth Every Morning Before Breakfast.     • nitroglycerin (NITROSTAT) 0.4 MG SL tablet Place 0.4 mg under the tongue.     • ondansetron ODT (ZOFRAN-ODT) 4 MG disintegrating tablet Take 4 mg by mouth Every 12 (Twelve) Hours As Needed.    "  • oxyCODONE-acetaminophen (PERCOCET)  MG per tablet Take 1 tablet by mouth every 6 (six) hours as needed for moderate pain (4-6).     • potassium chloride (MICRO-K) 10 MEQ CR capsule Take 10 mEq by mouth Daily.     • sodium chloride (NS) 0.9 % irrigation Irrigate with 1,000 mL to the affected area as directed by provider 1 (One) Time.     • tiZANidine (ZANAFLEX) 4 MG tablet Take 4 mg by mouth At Night As Needed for Muscle Spasms.     • Zinc 50 MG tablet Take 1 tablet by mouth Daily.     • nitrofurantoin, macrocrystal-monohydrate, (MACROBID) 100 MG capsule        No current facility-administered medications for this visit.      Vital Signs  Ht 160 cm (63\")   Wt 59 kg (130 lb)   BMI 23.03 kg/m²   Physical Exam   Constitutional: She is oriented to person, place, and time.   Eyes: EOM are normal. Pupils are equal, round, and reactive to light.   Neurological: She is oriented to person, place, and time. She has normal strength.   Psychiatric: Her speech is normal.     Neurologic Exam     Mental Status   Oriented to person, place, and time.   Speech: speech is normal   Level of consciousness: alert    Cranial Nerves     CN II   Visual fields full to confrontation.     CN III, IV, VI   Pupils are equal, round, and reactive to light.  Extraocular motions are normal.     CN V   Right facial sensation deficit: none  Left facial sensation deficit: none    CN VII   Facial expression full, symmetric.     CN VIII   Hearing: intact    CN IX, X   Palate: symmetric    CN XI   Right sternocleidomastoid strength: normal  Left sternocleidomastoid strength: normal    CN XII   Tongue deviation: none    Motor Exam     Strength   Strength 5/5 throughout.   Right biceps: 4/5  Left biceps: 4/5  Right triceps: 2/5  Left triceps: 2/5  Right wrist flexion: 2/5  Left wrist flexion: 2/5  Right interossei: 2/5  Left interossei: 2/5  Right iliopsoas: 0/5  Left iliopsoas: 0/5  Right quadriceps: 0/5  Left quadriceps: 0/5  Right hamstring: " 0/5  Left hamstrin/5  Right anterior tibial: 0/5  Left anterior tibial: 0/5  Right gastroc: 0/5  Left gastroc: 0/5    Sensory Exam   Right arm light touch: normal  Left arm light touch: normal  Lowest sensation to pinprick on right: C7  Lowest sensation to pinprick on left: C7  Lowest sensation to vibration on right: C7  Lowest sensation to vibration on left: C7    Gait, Coordination, and Reflexes     Gait  Gait: (Wheelchair-bound)    Reflexes   Reflexes 2+ except as noted.     Incision: Scan on 2019  3:53 PM by Bartolo Ndiaye APRN: Post op abdomen from pain pump incision   (Consent to obtain photo of postoperative site for documentation purposes only obtained verbally by Ms. Lynn)    Results Review: No new imaging      Assessment/Plan:   1. S/P insertion of intrathecal pump    2. Cigarette smoker    3. BMI 22.0-22.9, adult      Ms. Lynn has done well since we last saw her.  No complaints of pain near around incisional site or concern for postoperative incision.  Her incision is clean, dry, intact.  No drainage or discharge noted.  No signs of soft tissue infection.  We discussed signs and symptoms of dermal infections.  I advised patient to call to return for any additional concerns.    Kathie was seen today for post-op.    Diagnoses and all orders for this visit:    S/P insertion of intrathecal pump    Cigarette smoker    BMI 22.0-22.9, adult      Return if symptoms worsen or fail to improve.    I discussed the patients findings and my recommendations with patient    COLE Martin

## 2019-08-21 ENCOUNTER — OFFICE VISIT (OUTPATIENT)
Dept: NEUROSURGERY | Facility: CLINIC | Age: 54
End: 2019-08-21

## 2019-08-21 VITALS — BODY MASS INDEX: 23.04 KG/M2 | HEIGHT: 63 IN | WEIGHT: 130 LBS

## 2019-08-21 DIAGNOSIS — F17.210 CIGARETTE SMOKER: ICD-10-CM

## 2019-08-21 DIAGNOSIS — Z98.890 S/P INSERTION OF INTRATHECAL PUMP: Primary | ICD-10-CM

## 2019-08-21 PROCEDURE — 99213 OFFICE O/P EST LOW 20 MIN: CPT | Performed by: NURSE PRACTITIONER

## 2019-08-21 RX ORDER — ESOMEPRAZOLE MAGNESIUM 40 MG/1
CAPSULE, DELAYED RELEASE ORAL
COMMUNITY
Start: 2019-08-07

## 2019-08-21 RX ORDER — CELECOXIB 200 MG/1
CAPSULE ORAL
COMMUNITY
Start: 2019-08-07

## 2019-08-21 RX ORDER — NITROFURANTOIN 25; 75 MG/1; MG/1
CAPSULE ORAL
COMMUNITY
Start: 2019-08-02

## 2019-08-21 NOTE — PATIENT INSTRUCTIONS
Health Risks of Smoking  Smoking cigarettes is very bad for your health. Tobacco smoke has over 200 known poisons in it. It contains the poisonous gases nitrogen oxide and carbon monoxide. There are over 60 chemicals in tobacco smoke that cause cancer.  Smoking is difficult to quit because a chemical in tobacco, called nicotine, causes addiction or dependence. When you smoke and inhale, nicotine is absorbed rapidly into the bloodstream through your lungs. Both inhaled and non-inhaled nicotine may be addictive.  What are the risks of cigarette smoke?  Cigarette smokers have an increased risk of many serious medical problems, including:  · Lung cancer.  · Lung disease, such as pneumonia, bronchitis, and emphysema.  · Chest pain (angina) and heart attack because the heart is not getting enough oxygen.  · Heart disease and peripheral blood vessel disease.  · High blood pressure (hypertension).  · Stroke.  · Oral cancer, including cancer of the lip, mouth, or voice box.  · Bladder cancer.  · Pancreatic cancer.  · Cervical cancer.  · Pregnancy complications, including premature birth.  · Stillbirths and smaller  babies, birth defects, and genetic damage to sperm.  · Early menopause.  · Lower estrogen level for women.  · Infertility.  · Facial wrinkles.  · Blindness.  · Increased risk of broken bones (fractures).  · Senile dementia.  · Stomach ulcers and internal bleeding.  · Delayed wound healing and increased risk of complications during surgery.  · Even smoking lightly shortens your life expectancy by several years.    Because of secondhand smoke exposure, children of smokers have an increased risk of the following:  · Sudden infant death syndrome (SIDS).  · Respiratory infections.  · Lung cancer.  · Heart disease.  · Ear infections.    What are the benefits of quitting?  There are many health benefits of quitting smoking. Here are some of them:  · Within days of quitting smoking, your risk of having a heart  attack decreases, your blood flow improves, and your lung capacity improves. Blood pressure, pulse rate, and breathing patterns start returning to normal soon after quitting.  · Within months, your lungs may clear up completely.  · Quitting for 10 years reduces your risk of developing lung cancer and heart disease to almost that of a nonsmoker.  · People who quit may see an improvement in their overall quality of life.    How do I quit smoking?  Smoking is an addiction with both physical and psychological effects, and longtime habits can be hard to change. Your health care provider can recommend:  · Programs and community resources, which may include group support, education, or talk therapy.  · Prescription medicines to help reduce cravings.  · Nicotine replacement products, such as patches, gum, and nasal sprays. Use these products only as directed. Do not replace cigarette smoking with electronic cigarettes, which are commonly called e-cigarettes. The safety of e-cigarettes is not known, and some may contain harmful chemicals.  · A combination of two or more of these methods.    Where to find more information  · American Lung Association: www.lung.org  · American Cancer Society: www.cancer.org  Summary  · Smoking cigarettes is very bad for your health. Cigarette smokers have an increased risk of many serious medical problems, including several cancers, heart disease, and stroke.  · Smoking is an addiction with both physical and psychological effects, and longtime habits can be hard to change.  · By stopping right away, you can greatly reduce the risk of medical problems for you and your family.  · To help you quit smoking, your health care provider can recommend programs, community resources, prescription medicines, and nicotine replacement products such as patches, gum, and nasal sprays.  This information is not intended to replace advice given to you by your health care provider. Make sure you discuss any  questions you have with your health care provider.  Document Released: 01/25/2006 Document Revised: 12/22/2017 Document Reviewed: 12/22/2017  [employer] Interactive Patient Education © 2019 [employer]

## 2019-10-15 ENCOUNTER — TRANSCRIBE ORDERS (OUTPATIENT)
Dept: ADMINISTRATIVE | Facility: HOSPITAL | Age: 54
End: 2019-10-15

## 2019-10-15 DIAGNOSIS — F17.210 CIGARETTE SMOKER: ICD-10-CM

## 2019-10-15 DIAGNOSIS — Z72.0 TOBACCO USER: Primary | ICD-10-CM

## 2020-06-01 ENCOUNTER — OFFICE VISIT (OUTPATIENT)
Dept: UROLOGY | Facility: CLINIC | Age: 55
End: 2020-06-01

## 2020-06-01 VITALS — WEIGHT: 108 LBS | HEIGHT: 65 IN | TEMPERATURE: 97.8 F | BODY MASS INDEX: 17.99 KG/M2

## 2020-06-01 DIAGNOSIS — N31.9 NEUROGENIC BLADDER: Primary | ICD-10-CM

## 2020-06-01 PROCEDURE — 99203 OFFICE O/P NEW LOW 30 MIN: CPT | Performed by: UROLOGY

## 2020-07-07 ENCOUNTER — HOSPITAL ENCOUNTER (OUTPATIENT)
Dept: GENERAL RADIOLOGY | Facility: HOSPITAL | Age: 55
Discharge: HOME | End: 2020-07-07

## 2020-07-07 ENCOUNTER — HOSPITAL ENCOUNTER (OUTPATIENT)
Dept: ULTRASOUND IMAGING | Facility: HOSPITAL | Age: 55
Discharge: HOME | End: 2020-07-07
Admitting: UROLOGY

## 2020-07-07 DIAGNOSIS — N31.9 NEUROGENIC BLADDER: ICD-10-CM

## 2020-07-07 PROCEDURE — 76775 US EXAM ABDO BACK WALL LIM: CPT

## 2020-07-07 PROCEDURE — 74018 RADEX ABDOMEN 1 VIEW: CPT

## 2021-04-13 ENCOUNTER — TRANSCRIBE ORDERS (OUTPATIENT)
Dept: ADMINISTRATIVE | Facility: HOSPITAL | Age: 56
End: 2021-04-13

## 2021-04-13 ENCOUNTER — HOSPITAL ENCOUNTER (OUTPATIENT)
Dept: GENERAL RADIOLOGY | Facility: HOSPITAL | Age: 56
Discharge: HOME | End: 2021-04-13
Admitting: NURSE PRACTITIONER

## 2021-04-13 DIAGNOSIS — R19.03 ABDOMINAL MASS, RIGHT LOWER QUADRANT: Primary | ICD-10-CM

## 2021-04-13 DIAGNOSIS — R19.03 ABDOMINAL MASS, RIGHT LOWER QUADRANT: ICD-10-CM

## 2021-04-13 PROCEDURE — 74018 RADEX ABDOMEN 1 VIEW: CPT

## 2021-05-03 ENCOUNTER — OFFICE VISIT (OUTPATIENT)
Dept: UROLOGY | Facility: CLINIC | Age: 56
End: 2021-05-03

## 2021-05-03 ENCOUNTER — TELEPHONE (OUTPATIENT)
Dept: ONCOLOGY | Facility: CLINIC | Age: 56
End: 2021-05-03

## 2021-05-03 VITALS — TEMPERATURE: 97.4 F | HEIGHT: 65 IN | BODY MASS INDEX: 19.99 KG/M2 | WEIGHT: 120 LBS

## 2021-05-03 DIAGNOSIS — N31.9 NEUROGENIC BLADDER: ICD-10-CM

## 2021-05-03 DIAGNOSIS — R31.9 URINARY TRACT INFECTION WITH HEMATURIA, SITE UNSPECIFIED: Primary | ICD-10-CM

## 2021-05-03 DIAGNOSIS — N39.0 URINARY TRACT INFECTION WITH HEMATURIA, SITE UNSPECIFIED: Primary | ICD-10-CM

## 2021-05-03 LAB
BILIRUB BLD-MCNC: NEGATIVE MG/DL
CLARITY, POC: ABNORMAL
COLOR UR: YELLOW
GLUCOSE UR STRIP-MCNC: NEGATIVE MG/DL
KETONES UR QL: ABNORMAL
LEUKOCYTE EST, POC: ABNORMAL
NITRITE UR-MCNC: NEGATIVE MG/ML
PH UR: 6.5 [PH] (ref 5–8)
PROT UR STRIP-MCNC: ABNORMAL MG/DL
RBC # UR STRIP: ABNORMAL /UL
SP GR UR: 1.01 (ref 1–1.03)
UROBILINOGEN UR QL: NORMAL

## 2021-05-03 PROCEDURE — 87086 URINE CULTURE/COLONY COUNT: CPT | Performed by: PHYSICIAN ASSISTANT

## 2021-05-03 PROCEDURE — 87077 CULTURE AEROBIC IDENTIFY: CPT | Performed by: PHYSICIAN ASSISTANT

## 2021-05-03 PROCEDURE — 99213 OFFICE O/P EST LOW 20 MIN: CPT | Performed by: PHYSICIAN ASSISTANT

## 2021-05-03 PROCEDURE — 87186 SC STD MICRODIL/AGAR DIL: CPT | Performed by: PHYSICIAN ASSISTANT

## 2021-05-03 RX ORDER — MONTELUKAST SODIUM 10 MG/1
TABLET ORAL
COMMUNITY
Start: 2021-04-27

## 2021-05-03 RX ORDER — PENTOXIFYLLINE 400 MG/1
TABLET, EXTENDED RELEASE ORAL
COMMUNITY
Start: 2021-04-27

## 2021-05-03 NOTE — TELEPHONE ENCOUNTER
Caller: REINALDO CASTANEDA    Relationship to patient: SELF    Best call back number: [phone]    Type of visit: NEW PATIENT WITH DR LARSEN    Requested date: PATIENT REQUESTS 3:00 OR LATER    If rescheduling, when is the original appointment:  5/4/20

## 2021-05-04 ENCOUNTER — APPOINTMENT (OUTPATIENT)
Dept: LAB | Facility: HOSPITAL | Age: 56
End: 2021-05-04

## 2021-05-05 PROBLEM — B96.29 URINARY TRACT INFECTION DUE TO EXTENDED-SPECTRUM BETA LACTAMASE (ESBL) PRODUCING ESCHERICHIA COLI: Status: ACTIVE | Noted: 2021-05-05

## 2021-05-05 PROBLEM — N39.0 URINARY TRACT INFECTION DUE TO EXTENDED-SPECTRUM BETA LACTAMASE (ESBL) PRODUCING ESCHERICHIA COLI: Status: ACTIVE | Noted: 2021-05-05

## 2021-05-05 PROBLEM — Z16.12 URINARY TRACT INFECTION DUE TO EXTENDED-SPECTRUM BETA LACTAMASE (ESBL) PRODUCING ESCHERICHIA COLI: Status: ACTIVE | Noted: 2021-05-05

## 2021-05-05 LAB — BACTERIA SPEC AEROBE CULT: ABNORMAL

## 2021-05-05 RX ORDER — SODIUM CHLORIDE 9 MG/ML
250 INJECTION, SOLUTION INTRAVENOUS ONCE
Start: 2021-05-07 | End: 2021-05-10

## 2021-05-06 ENCOUNTER — TELEPHONE (OUTPATIENT)
Dept: UROLOGY | Facility: CLINIC | Age: 56
End: 2021-05-06

## 2021-05-06 NOTE — TELEPHONE ENCOUNTER
Pt called back, gave results from UTI and advised that she would have to do 10 days of IV antibiotics. Pt stated that she will not do that due to her staying in bed because she has wounds. She stated that she lives an hour away and will not do IV antibiotics for 10 days straight. I advised pt that this was her best treatment for her UTI. Pt stated that there would have to be another treatment for her to use. Advised pt that I would speak with Martin Ross PA-C and get back with her.

## 2021-05-07 ENCOUNTER — APPOINTMENT (OUTPATIENT)
Dept: ONCOLOGY | Facility: HOSPITAL | Age: 56
End: 2021-05-07

## 2021-05-07 ENCOUNTER — TELEPHONE (OUTPATIENT)
Dept: UROLOGY | Facility: CLINIC | Age: 56
End: 2021-05-07

## 2021-05-07 NOTE — TELEPHONE ENCOUNTER
Marika with [facility] called stated that she will need some extra information before they will be able to start the process of getting Mrs Lynn set up for Home Health. She will need Demographics, Insurance Cards, Providers note with why pt will need home health orders and the IV antibiotics, how many days, as well as a picc line order if pt does not already have one. If there is any questions pls call [phone]

## 2021-05-08 ENCOUNTER — APPOINTMENT (OUTPATIENT)
Dept: ONCOLOGY | Facility: HOSPITAL | Age: 56
End: 2021-05-08

## 2021-05-09 ENCOUNTER — APPOINTMENT (OUTPATIENT)
Dept: ONCOLOGY | Facility: HOSPITAL | Age: 56
End: 2021-05-09

## 2021-05-10 ENCOUNTER — APPOINTMENT (OUTPATIENT)
Dept: ONCOLOGY | Facility: HOSPITAL | Age: 56
End: 2021-05-10

## 2021-05-10 ENCOUNTER — TELEPHONE (OUTPATIENT)
Dept: UROLOGY | Facility: CLINIC | Age: 56
End: 2021-05-10

## 2021-05-10 DIAGNOSIS — N39.0 URINARY TRACT INFECTION WITHOUT HEMATURIA, SITE UNSPECIFIED: Primary | ICD-10-CM

## 2021-05-10 NOTE — TELEPHONE ENCOUNTER
"Contacted pt to advise her that [employer] had contacted the office and advised that the medication would be covered. I asked the pt if she had a care taker for when she starts the antibiotic and she stated yes, her  and a friend. Pt then stated that she is not feeling the best and thinks it would be better for her to go to the ER. I advised the pt that if that is what she would like to to coming to [facility] we would be able to make sure her PICC line was put in the the antibiotic could be stated. Pt stated that she [place] [facility] [place]. Pt asked for us to call that ER to tell them what she needed to have done. I advised the pt that we are not able to tell another doctor at a different facility \"what she has to have\". Pt stated that she would think about which hospital ER to go to and contact us once she does.   "

## 2021-05-11 ENCOUNTER — APPOINTMENT (OUTPATIENT)
Dept: ONCOLOGY | Facility: HOSPITAL | Age: 56
End: 2021-05-11

## 2021-05-11 ENCOUNTER — TELEPHONE (OUTPATIENT)
Dept: UROLOGY | Facility: CLINIC | Age: 56
End: 2021-05-11

## 2021-05-11 NOTE — TELEPHONE ENCOUNTER
Left message for pt to contact office. Last time I was able to speak with the pt 05/10/21 she stated that she was going to [facility] due to her not feeling well from her UTI. Tried to contact the pt to see if she was hospitalized or if they were able to send her home.

## 2021-05-11 NOTE — TELEPHONE ENCOUNTER
Spoke with pt and she stated that [facility] did admit [place] last night. Pt stated that she requested the doctor contact Martin Ross PA-C so that he would know what is going on and if they were giving her the correct antibiotic. I advised the pt that we don't give the orders to the doctors about what she needs to be taking for her infection. Pt stated that she would still like the doctor to speak with Martin Ross PA-C. I advised her if the doctor taking care of her wants to reach out to Martin Ross PA-C they can contact the office. Pt did state that they will be putting in a PICC line this afternoon.

## 2021-05-11 NOTE — TELEPHONE ENCOUNTER
PT WAS RETURNING A CALL. STATES THAT [facility] DID ADMIT HER, I SPOKE WITH JIM AND HE SAID TO SEND YOU A MESSAGE AND THAT YOU WOULD REACH OUT TO PT TO CHECK ON HER. I STATED THIS TO PT AND SHE SAID THAT WOULD BE GOOD.

## 2021-05-12 ENCOUNTER — APPOINTMENT (OUTPATIENT)
Dept: ONCOLOGY | Facility: HOSPITAL | Age: 56
End: 2021-05-12

## 2021-05-13 ENCOUNTER — TELEPHONE (OUTPATIENT)
Dept: ONCOLOGY | Facility: CLINIC | Age: 56
End: 2021-05-13

## 2021-05-13 ENCOUNTER — APPOINTMENT (OUTPATIENT)
Dept: ONCOLOGY | Facility: HOSPITAL | Age: 56
End: 2021-05-13

## 2021-05-13 NOTE — TELEPHONE ENCOUNTER
Caller: PT    Relationship to patient: PT    Best call back number: [phone]    Chief complaint:PT REQUESTING A 330 PM APPT OR LATER FOR NEW PT DUE TO TRANSPORTATION    Type of visit: NEW PT LAB AND FU    Requested date: NA    If rescheduling, when is the original appointment: 5/18/21    Additional notes:

## 2021-05-14 ENCOUNTER — APPOINTMENT (OUTPATIENT)
Dept: ONCOLOGY | Facility: HOSPITAL | Age: 56
End: 2021-05-14

## 2021-05-15 ENCOUNTER — APPOINTMENT (OUTPATIENT)
Dept: ONCOLOGY | Facility: HOSPITAL | Age: 56
End: 2021-05-15

## 2021-05-16 ENCOUNTER — APPOINTMENT (OUTPATIENT)
Dept: ONCOLOGY | Facility: HOSPITAL | Age: 56
End: 2021-05-16

## 2021-05-18 ENCOUNTER — APPOINTMENT (OUTPATIENT)
Dept: LAB | Facility: HOSPITAL | Age: 56
End: 2021-05-18

## 2021-05-24 ENCOUNTER — TELEPHONE (OUTPATIENT)
Dept: UROLOGY | Facility: CLINIC | Age: 56
End: 2021-05-24

## 2021-05-24 ENCOUNTER — APPOINTMENT (OUTPATIENT)
Dept: LAB | Facility: HOSPITAL | Age: 56
End: 2021-05-24

## 2021-05-24 NOTE — TELEPHONE ENCOUNTER
Pt called to advise that she is out of the hospital and was only in for 4-5 days. Pt stated that she finished up all the antibiotics while she was in the hosptial. Pt stated they checked her urine before she left, and was advised that the UTI was gone. Pt states that her urine has an odor. Pt believes that Martin Ross PA-C was going to put her on a maintenance meds for her frequent UTI's. Pt stated that she has Cipro but has not taken any of this due to wanting to make sure this is what Martin Ross PA-C wants her to take.

## 2021-06-15 ENCOUNTER — APPOINTMENT (OUTPATIENT)
Dept: LAB | Facility: HOSPITAL | Age: 56
End: 2021-06-15

## 2023-05-04 ENCOUNTER — TELEPHONE (OUTPATIENT)
Dept: NEUROLOGY | Age: 58
End: 2023-05-04

## 2023-05-04 NOTE — TELEPHONE ENCOUNTER
1st attempt to call patient to schedule NP appt. Voicemail full, unable to leave message. Patient can be scheduled next available with CATY Gallegos or [employer].

## 2023-05-23 ENCOUNTER — TELEPHONE (OUTPATIENT)
Dept: NEUROLOGY | Age: 58
End: 2023-05-23

## 2023-05-23 NOTE — TELEPHONE ENCOUNTER
Please mail upcoming new patient appointment information to [id number], [facility], [address] [place] [address]   Thank you

## 2023-06-07 ENCOUNTER — TRANSCRIBE ORDERS (OUTPATIENT)
Dept: ADMINISTRATIVE | Facility: HOSPITAL | Age: 58
End: 2023-06-07
Payer: MEDICARE

## 2023-06-07 DIAGNOSIS — M48.02 SPINAL STENOSIS IN CERVICAL REGION: Primary | ICD-10-CM

## 2023-06-07 DIAGNOSIS — M54.6 PAIN IN THORACIC SPINE: ICD-10-CM

## 2023-06-22 ENCOUNTER — OFFICE VISIT (OUTPATIENT)
Dept: NEUROLOGY | Age: 58
End: 2023-06-22
Payer: COMMERCIAL

## 2023-06-22 VITALS
OXYGEN SATURATION: 96 % | WEIGHT: 134 LBS | HEART RATE: 71 BPM | DIASTOLIC BLOOD PRESSURE: 61 MMHG | SYSTOLIC BLOOD PRESSURE: 88 MMHG | BODY MASS INDEX: 22.33 KG/M2 | HEIGHT: 65 IN

## 2023-06-22 DIAGNOSIS — R20.2 PARESTHESIA: Primary | ICD-10-CM

## 2023-06-22 DIAGNOSIS — G62.9 NEUROPATHY: ICD-10-CM

## 2023-06-22 DIAGNOSIS — R29.898 BILATERAL ARM WEAKNESS: ICD-10-CM

## 2023-06-22 DIAGNOSIS — M62.838 MUSCLE SPASTICITY: ICD-10-CM

## 2023-06-22 DIAGNOSIS — R52 BURNING PAIN: ICD-10-CM

## 2023-06-22 LAB
CRP SERPL HS-MCNC: 1.61 MG/DL (ref 0–0.5)
ERYTHROCYTE [SEDIMENTATION RATE] IN BLOOD BY WESTERGREN METHOD: 43 MM/HR (ref 0–25)
RHEUMATOID FACT SER NEPH-ACNC: 12 IU/ML
TSH SERPL DL<=0.005 MIU/L-ACNC: 0.98 UIU/ML (ref 0.35–5.5)
VIT B12 SERPL-MCNC: 1140 PG/ML (ref 211–946)

## 2023-06-22 PROCEDURE — 99203 OFFICE O/P NEW LOW 30 MIN: CPT | Performed by: NURSE PRACTITIONER

## 2023-06-22 RX ORDER — DULOXETIN HYDROCHLORIDE 30 MG/1
30 CAPSULE, DELAYED RELEASE ORAL DAILY
Qty: 30 CAPSULE | Refills: 3 | Status: SHIPPED | OUTPATIENT
Start: 2023-06-22

## 2023-06-22 RX ORDER — DULOXETIN HYDROCHLORIDE 30 MG/1
30 CAPSULE, DELAYED RELEASE ORAL DAILY
Qty: 30 CAPSULE | Refills: 3 | Status: SHIPPED | OUTPATIENT
Start: 2023-06-22 | End: 2023-06-22

## 2023-06-22 NOTE — PROGRESS NOTES
REVIEW OF SYSTEMS    Constitutional: []Fever []Sweat []Chills [] Recent Injury [x] Denies all unless marked  HEENT:[]Headache  [] Head Injury/Hearing Loss  [] Sore Throat  [] Ear Ache/Dizziness  [x] Denies all unless marked  Spine:  [] Neck pain  [x] Back pain  [] Sciaticia  [x] Denies all unless marked  Cardiovascular:[]Heart Disease []Chest Pain [] Palpitations  [x] Denies all unless marked  Pulmonary: []Shortness of Breath []Cough   [x] Denies all unless marke  Gastrointestinal: []Nausea  []Vomiting  []Abdominal Pain  []Constipation  []Diarrhea  []Dark Bloody Stools  [x] Denies all unless marked  Psychiatric/Behavioral:[] Depression [] Anxiety [x] Denies all unless marked  Genitourinary:   [] Frequency  [] Urgency  [] Incontinence [] Pain with Urination  [x] Denies all unless marked  Extremities: [x]Pain  []Swelling  [x] Denies all unless marked  Musculoskeletal: [x] Muscle Pain  [] Joint Pain  [] Arthritis [] Muscle Cramps [] Muscle Twitches  [x] Denies all unless marked  Sleep: [] Insomnia [] Snoring [] Restless Legs [] Sleep Apnea  [] Daytime Sleepiness  [x] Denies all unless marked  Skin:[] Rash [] Skin Discoloration [x] Denies all unless marked   Neurological: []Visual Disturbance/Memory Loss [] Loss of Balance [] Slurred Speech/Weakness [] Seizures  [] Vertigo/Dizziness [x] Denies all unless marked
[] Neck pain  [x] Back pain  [] Sciaticia  [x] Denies all unless marked  Cardiovascular:[]Heart Disease []Chest Pain [] Palpitations  [x] Denies all unless marked  Pulmonary: []Shortness of Breath []Cough   [x] Denies all unless marke  Gastrointestinal: []Nausea  []Vomiting  []Abdominal Pain  []Constipation  []Diarrhea  []Dark Bloody Stools  [x] Denies all unless marked  Psychiatric/Behavioral:[] Depression [] Anxiety [x] Denies all unless marked  Genitourinary:   [] Frequency  [] Urgency  [] Incontinence [] Pain with Urination  [x] Denies all unless marked  Extremities: [x]Pain  []Swelling  [x] Denies all unless marked  Musculoskeletal: [x] Muscle Pain  [] Joint Pain  [] Arthritis [] Muscle Cramps [] Muscle Twitches  [x] Denies all unless marked  Sleep: [] Insomnia [] Snoring [] Restless Legs [] Sleep Apnea  [] Daytime Sleepiness  [x] Denies all unless marked  Skin:[] Rash [] Skin Discoloration [x] Denies all unless marked   Neurological: []Visual Disturbance/Memory Loss [] Loss of Balance [] Slurred Speech/Weakness [] Seizures  [] Vertigo/Dizziness [x] Denies all unless marked    The MA has completed the ROS with the patient. I have reviewed it in its' entirety with the patient and agree with the documentation. PHYSICAL EXAM  BP 88/61   Pulse 71   Ht 5' 5\" (1.651 m)   Wt 134 lb (60.8 kg)   SpO2 96%   BMI 22.30 kg/m²       Constitutional - No acute distress    HEENT- Conjunctiva normal.  No scars, masses, or lesions over external nose or ears, no neck masses noted, no jugular vein distension, no bruit  Cardiac- Regular rate and rhythm  Pulmonary- Good expansion, normal effort without use of accessory muscles  Musculoskeletal - No significant wasting of muscles noted, no bony deformities  Extremities - No clubbing, cyanosis or edema  Skin - Warm, dry, and intact.   No rash, erythema, or pallor  Psychiatric - Mood, affect, and behavior appear normal      NEUROLOGICAL EXAM     Mental status   [x] Awake, alert,

## 2023-06-25 LAB — NUCLEAR IGG SER QL IA: NORMAL

## 2023-06-26 LAB
ALBUMIN SERPL-MCNC: 3.76 G/DL (ref 3.75–5.01)
ALPHA1 GLOB SERPL ELPH-MCNC: 0.43 G/DL (ref 0.19–0.46)
ALPHA2 GLOB SERPL ELPH-MCNC: 0.88 G/DL (ref 0.48–1.05)
B-GLOBULIN SERPL ELPH-MCNC: 1.13 G/DL (ref 0.48–1.1)
GAMMA GLOB SERPL ELPH-MCNC: 1.4 G/DL (ref 0.62–1.51)
INTERPRETATION SERPL IFE-IMP: ABNORMAL
PROT SERPL-MCNC: 7.6 G/DL (ref 6.3–8.2)
PROTEIN ELECTROPHORESIS, SERUM: ABNORMAL

## 2023-06-27 DIAGNOSIS — R77.8 ABNORMAL SPEP: Primary | ICD-10-CM

## 2023-06-28 ENCOUNTER — TELEPHONE (OUTPATIENT)
Dept: NEUROLOGY | Age: 58
End: 2023-06-28

## 2023-07-13 ENCOUNTER — HOSPITAL ENCOUNTER (OUTPATIENT)
Dept: NEUROLOGY | Age: 58
Discharge: HOME | End: 2023-07-13
Payer: COMMERCIAL

## 2023-07-13 DIAGNOSIS — R52 BURNING PAIN: ICD-10-CM

## 2023-07-13 DIAGNOSIS — R20.2 PARESTHESIA: ICD-10-CM

## 2023-07-13 PROCEDURE — 95911 NRV CNDJ TEST 9-10 STUDIES: CPT

## 2023-07-13 PROCEDURE — 95911 NRV CNDJ TEST 9-10 STUDIES: CPT | Performed by: PSYCHIATRY & NEUROLOGY

## 2023-07-13 PROCEDURE — 95886 MUSC TEST DONE W/N TEST COMP: CPT

## 2023-07-24 PROBLEM — M62.838 SPASM OF MUSCLE: Status: ACTIVE | Noted: 2023-07-24

## 2023-07-31 PROBLEM — R25.2 SPASTICITY: Status: ACTIVE | Noted: 2023-07-31

## 2023-08-28 ENCOUNTER — HOSPITAL ENCOUNTER (OUTPATIENT)
Dept: MRI IMAGING | Facility: HOSPITAL | Age: 58
Discharge: HOME | End: 2023-08-28
Payer: MEDICARE

## 2023-08-28 DIAGNOSIS — M48.02 SPINAL STENOSIS IN CERVICAL REGION: ICD-10-CM

## 2023-08-28 DIAGNOSIS — M54.6 PAIN IN THORACIC SPINE: ICD-10-CM

## 2023-08-28 PROCEDURE — 72141 MRI NECK SPINE W/O DYE: CPT

## 2023-08-28 PROCEDURE — 72146 MRI CHEST SPINE W/O DYE: CPT

## 2023-09-27 ENCOUNTER — OFFICE VISIT (OUTPATIENT)
Dept: NEUROLOGY | Age: 58
End: 2023-09-27
Payer: COMMERCIAL

## 2023-09-27 VITALS
OXYGEN SATURATION: 97 % | DIASTOLIC BLOOD PRESSURE: 56 MMHG | HEIGHT: 65 IN | HEART RATE: 66 BPM | BODY MASS INDEX: 21.83 KG/M2 | WEIGHT: 131 LBS | SYSTOLIC BLOOD PRESSURE: 83 MMHG

## 2023-09-27 DIAGNOSIS — R52 BURNING PAIN: ICD-10-CM

## 2023-09-27 DIAGNOSIS — R20.2 PARESTHESIA: Primary | ICD-10-CM

## 2023-09-27 DIAGNOSIS — R29.898 BILATERAL ARM WEAKNESS: ICD-10-CM

## 2023-09-27 PROCEDURE — 99214 OFFICE O/P EST MOD 30 MIN: CPT | Performed by: NURSE PRACTITIONER

## 2023-09-27 RX ORDER — FLUTICASONE FUROATE, UMECLIDINIUM BROMIDE AND VILANTEROL TRIFENATATE 100; 62.5; 25 UG/1; UG/1; UG/1
POWDER RESPIRATORY (INHALATION)
COMMUNITY
Start: 2023-09-11

## 2023-09-27 RX ORDER — ZOLPIDEM TARTRATE 10 MG/1
TABLET ORAL
COMMUNITY
Start: 2023-09-25

## 2023-09-27 RX ORDER — DULOXETIN HYDROCHLORIDE 60 MG/1
60 CAPSULE, DELAYED RELEASE ORAL DAILY
Qty: 30 CAPSULE | Refills: 3 | Status: SHIPPED | OUTPATIENT
Start: 2023-09-27

## 2023-09-27 RX ORDER — ONDANSETRON 4 MG/1
TABLET, FILM COATED ORAL
COMMUNITY
Start: 2023-08-01

## 2023-09-27 NOTE — PROGRESS NOTES
REVIEW OF SYSTEMS    Constitutional: []Fever []Sweat []Chills [] Recent Injury [x] Denies all unless marked  HEENT:[]Headache  [] Head Injury/Hearing Loss  [] Sore Throat  [] Ear Ache/Dizziness  [x] Denies all unless marked  Spine:  [] Neck pain  [] Back pain  [] Sciaticia  [x] Denies all unless marked  Cardiovascular:[]Heart Disease []Chest Pain [] Palpitations  [x] Denies all unless marked  Pulmonary: []Shortness of Breath []Cough   [x] Denies all unless marke  Gastrointestinal: []Nausea  []Vomiting  []Abdominal Pain  []Constipation  []Diarrhea  []Dark Bloody Stools  [x] Denies all unless marked  Psychiatric/Behavioral:[] Depression [] Anxiety [x] Denies all unless marked  Genitourinary:   [] Frequency  [] Urgency  [] Incontinence [] Pain with Urination  [x] Denies all unless marked  Extremities: [x]Pain  []Swelling  [x] Denies all unless marked  Musculoskeletal: [] Muscle Pain  [x] Joint Pain  [] Arthritis [] Muscle Cramps [] Muscle Twitches  [x] Denies all unless marked  Sleep: [] Insomnia [] Snoring [] Restless Legs [] Sleep Apnea  [] Daytime Sleepiness  [x] Denies all unless marked  Skin:[] Rash [] Skin Discoloration [x] Denies all unless marked   Neurological: []Visual Disturbance/Memory Loss [] Loss of Balance [] Slurred Speech/Weakness [] Seizures  [] Vertigo/Dizziness [x] Denies all unless marked
She may need to follow with pain management for this. ICD-10-CM    1. Paresthesia  R20.2 DULoxetine (CYMBALTA) 60 MG extended release capsule      2. Burning pain  R52 DULoxetine (CYMBALTA) 60 MG extended release capsule      3. Bilateral arm weakness  R29.898             PLAN:  Increase Cymbalta to 60 mg daily. Side effects discussed. Compound pain cream PRN to points of tenderness on arms (bilateral elbows)   Botox for upper extremity spasticity felt to be low yield, may impair function. Continue Lyrica as prescribed. Could consider increasing this but would defer to pain management. PT for bilateral upper extremity weakness and spasticity BUE at assisted living facility. 5. Patient will require follow up to evaluate benefit of medication, side effects, and disease progression. 6. Return in about 3 months (around 12/27/2023) for Follow up, sooner with worsening symptoms. KELLI Gee    I spent 33 minutes caring for Richard Hilliard on this date of service. This time includes time spent by me in the following activities: preparing for the visit, reviewing tests, performing a medically appropriate examination and/or evaluation , counseling and educating the patient/family/caregiver, ordering medications, tests, or procedures, documenting information in the medical record and care coordination. This dictation was generated by voice recognition computer software. Although all attempts were made to edit the dictation for accuracy, there may be errors in the transcription that are not intended.

## 2024-01-01 ENCOUNTER — HOSPITAL ENCOUNTER (INPATIENT)
Facility: HOSPITAL | Age: 59
LOS: 5 days | End: 2024-12-13
Attending: INTERNAL MEDICINE | Admitting: STUDENT IN AN ORGANIZED HEALTH CARE EDUCATION/TRAINING PROGRAM
Payer: MEDICARE

## 2024-01-01 ENCOUNTER — APPOINTMENT (OUTPATIENT)
Dept: GENERAL RADIOLOGY | Facility: HOSPITAL | Age: 59
End: 2024-01-01
Payer: MEDICARE

## 2024-01-01 VITALS
BODY MASS INDEX: 21.23 KG/M2 | HEART RATE: 66 BPM | HEIGHT: 65 IN | RESPIRATION RATE: 14 BRPM | OXYGEN SATURATION: 96 % | TEMPERATURE: 98.1 F | SYSTOLIC BLOOD PRESSURE: 117 MMHG | WEIGHT: 127.4 LBS | DIASTOLIC BLOOD PRESSURE: 55 MMHG

## 2024-01-01 DIAGNOSIS — J18.9 PNEUMONIA OF LEFT LOWER LOBE DUE TO INFECTIOUS ORGANISM: Primary | ICD-10-CM

## 2024-01-01 DIAGNOSIS — J96.21 ACUTE ON CHRONIC RESPIRATORY FAILURE WITH HYPOXIA: ICD-10-CM

## 2024-01-01 DIAGNOSIS — L89.90 PRESSURE ULCERS OF SKIN OF MULTIPLE TOPOGRAPHIC SITES: ICD-10-CM

## 2024-01-01 LAB
ALBUMIN SERPL-MCNC: 2.7 G/DL (ref 3.5–5.2)
ALBUMIN/GLOB SERPL: 0.6 G/DL
ALP SERPL-CCNC: 129 U/L (ref 39–117)
ALT SERPL W P-5'-P-CCNC: 28 U/L (ref 1–33)
ANION GAP SERPL CALCULATED.3IONS-SCNC: 11 MMOL/L (ref 5–15)
ANION GAP SERPL CALCULATED.3IONS-SCNC: 7 MMOL/L (ref 5–15)
ANION GAP SERPL CALCULATED.3IONS-SCNC: 8 MMOL/L (ref 5–15)
ANISOCYTOSIS BLD QL: ABNORMAL
ARTERIAL PATENCY WRIST A: ABNORMAL
AST SERPL-CCNC: 32 U/L (ref 1–32)
ATMOSPHERIC PRESS: 751 MMHG
B PARAPERT DNA SPEC QL NAA+PROBE: NOT DETECTED
B PERT DNA SPEC QL NAA+PROBE: NOT DETECTED
BACTERIA SPEC AEROBE CULT: ABNORMAL
BACTERIA SPEC AEROBE CULT: ABNORMAL
BACTERIA SPEC AEROBE CULT: NORMAL
BACTERIA SPEC AEROBE CULT: NORMAL
BASE EXCESS BLDA CALC-SCNC: 10.1 MMOL/L (ref 0–2)
BASO STIPL COARSE BLD QL SMEAR: ABNORMAL
BASOPHILS # BLD MANUAL: 0 10*3/MM3 (ref 0–0.2)
BASOPHILS # BLD MANUAL: 0 10*3/MM3 (ref 0–0.2)
BASOPHILS # BLD MANUAL: 0.08 10*3/MM3 (ref 0–0.2)
BASOPHILS NFR BLD MANUAL: 0 % (ref 0–1.5)
BASOPHILS NFR BLD MANUAL: 0 % (ref 0–1.5)
BASOPHILS NFR BLD MANUAL: 1 % (ref 0–1.5)
BDY SITE: ABNORMAL
BILIRUB SERPL-MCNC: 0.2 MG/DL (ref 0–1.2)
BODY TEMPERATURE: 37
BUN SERPL-MCNC: 36 MG/DL (ref 8–23)
BUN SERPL-MCNC: 40 MG/DL (ref 8–23)
BUN SERPL-MCNC: 49 MG/DL (ref 8–23)
BUN/CREAT SERPL: 128.6 (ref 7–25)
BUN/CREAT SERPL: 153.8 (ref 7–25)
BUN/CREAT SERPL: 158.1 (ref 7–25)
C PNEUM DNA NPH QL NAA+NON-PROBE: NOT DETECTED
CALCIUM SPEC-SCNC: 8.9 MG/DL (ref 8.6–10.5)
CALCIUM SPEC-SCNC: 9.4 MG/DL (ref 8.6–10.5)
CALCIUM SPEC-SCNC: 9.5 MG/DL (ref 8.6–10.5)
CARBAMAZEPINE SERPL-MCNC: 6.7 MCG/ML (ref 4–12)
CHLORIDE SERPL-SCNC: 100 MMOL/L (ref 98–107)
CHLORIDE SERPL-SCNC: 103 MMOL/L (ref 98–107)
CHLORIDE SERPL-SCNC: 111 MMOL/L (ref 98–107)
CLUMPED PLATELETS: PRESENT
CO2 SERPL-SCNC: 27 MMOL/L (ref 22–29)
CO2 SERPL-SCNC: 29 MMOL/L (ref 22–29)
CO2 SERPL-SCNC: 32 MMOL/L (ref 22–29)
CREAT SERPL-MCNC: 0.26 MG/DL (ref 0.57–1)
CREAT SERPL-MCNC: 0.28 MG/DL (ref 0.57–1)
CREAT SERPL-MCNC: 0.31 MG/DL (ref 0.57–1)
D-LACTATE SERPL-SCNC: 1.2 MMOL/L (ref 0.5–2)
DEPRECATED RDW RBC AUTO: 55.9 FL (ref 37–54)
DEPRECATED RDW RBC AUTO: 57.2 FL (ref 37–54)
DEPRECATED RDW RBC AUTO: 57.6 FL (ref 37–54)
DEVELOPER EXPIRATION DATE: NORMAL
DEVELOPER LOT NUMBER: 275
EGFRCR SERPLBLD CKD-EPI 2021: 119.9 ML/MIN/1.73
EGFRCR SERPLBLD CKD-EPI 2021: 122.9 ML/MIN/1.73
EGFRCR SERPLBLD CKD-EPI 2021: 125.1 ML/MIN/1.73
EOSINOPHIL # BLD MANUAL: 0 10*3/MM3 (ref 0–0.4)
EOSINOPHIL # BLD MANUAL: 0 10*3/MM3 (ref 0–0.4)
EOSINOPHIL # BLD MANUAL: 0.21 10*3/MM3 (ref 0–0.4)
EOSINOPHIL NFR BLD MANUAL: 0 % (ref 0.3–6.2)
EOSINOPHIL NFR BLD MANUAL: 0 % (ref 0.3–6.2)
EOSINOPHIL NFR BLD MANUAL: 3 % (ref 0.3–6.2)
ERYTHROCYTE [DISTWIDTH] IN BLOOD BY AUTOMATED COUNT: 17.2 % (ref 12.3–15.4)
ERYTHROCYTE [DISTWIDTH] IN BLOOD BY AUTOMATED COUNT: 17.4 % (ref 12.3–15.4)
ERYTHROCYTE [DISTWIDTH] IN BLOOD BY AUTOMATED COUNT: 17.4 % (ref 12.3–15.4)
EXPIRATION DATE: NORMAL
FECAL OCCULT BLOOD SCREEN, POC: NEGATIVE
FERRITIN SERPL-MCNC: 169.6 NG/ML (ref 13–150)
FLUAV SUBTYP SPEC NAA+PROBE: NOT DETECTED
FLUBV RNA ISLT QL NAA+PROBE: NOT DETECTED
FOLATE SERPL-MCNC: >20 NG/ML (ref 4.78–24.2)
GAS FLOW AIRWAY: 5 LPM
GEN 5 1HR TROPONIN T REFLEX: 31 NG/L
GIANT PLATELETS: ABNORMAL
GLOBULIN UR ELPH-MCNC: 4.9 GM/DL
GLUCOSE BLDC GLUCOMTR-MCNC: 130 MG/DL (ref 70–130)
GLUCOSE BLDC GLUCOMTR-MCNC: 135 MG/DL (ref 70–130)
GLUCOSE BLDC GLUCOMTR-MCNC: 198 MG/DL (ref 70–130)
GLUCOSE SERPL-MCNC: 124 MG/DL (ref 65–99)
GLUCOSE SERPL-MCNC: 150 MG/DL (ref 65–99)
GLUCOSE SERPL-MCNC: 93 MG/DL (ref 65–99)
GRAM STN SPEC: ABNORMAL
HADV DNA SPEC NAA+PROBE: NOT DETECTED
HCO3 BLDA-SCNC: 37 MMOL/L (ref 20–26)
HCOV 229E RNA SPEC QL NAA+PROBE: NOT DETECTED
HCOV HKU1 RNA SPEC QL NAA+PROBE: NOT DETECTED
HCOV NL63 RNA SPEC QL NAA+PROBE: NOT DETECTED
HCOV OC43 RNA SPEC QL NAA+PROBE: NOT DETECTED
HCT VFR BLD AUTO: 28.4 % (ref 34–46.6)
HCT VFR BLD AUTO: 29.9 % (ref 34–46.6)
HCT VFR BLD AUTO: 31.3 % (ref 34–46.6)
HGB BLD-MCNC: 8 G/DL (ref 12–15.9)
HGB BLD-MCNC: 8.4 G/DL (ref 12–15.9)
HGB BLD-MCNC: 8.9 G/DL (ref 12–15.9)
HMPV RNA NPH QL NAA+NON-PROBE: NOT DETECTED
HOLD SPECIMEN: NORMAL
HOLD SPECIMEN: NORMAL
HPIV1 RNA ISLT QL NAA+PROBE: NOT DETECTED
HPIV2 RNA SPEC QL NAA+PROBE: NOT DETECTED
HPIV3 RNA NPH QL NAA+PROBE: NOT DETECTED
HPIV4 P GENE NPH QL NAA+PROBE: NOT DETECTED
HYPOCHROMIA BLD QL: ABNORMAL
IRON 24H UR-MRATE: 12 MCG/DL (ref 37–145)
IRON SATN MFR SERPL: 4 % (ref 20–50)
LYMPHOCYTES # BLD MANUAL: 0.77 10*3/MM3 (ref 0.7–3.1)
LYMPHOCYTES # BLD MANUAL: 0.91 10*3/MM3 (ref 0.7–3.1)
LYMPHOCYTES # BLD MANUAL: 1.09 10*3/MM3 (ref 0.7–3.1)
LYMPHOCYTES NFR BLD MANUAL: 2 % (ref 5–12)
LYMPHOCYTES NFR BLD MANUAL: 5 % (ref 5–12)
LYMPHOCYTES NFR BLD MANUAL: 5 % (ref 5–12)
Lab: 275
Lab: ABNORMAL
M PNEUMO IGG SER IA-ACNC: NOT DETECTED
MCH RBC QN AUTO: 25.2 PG (ref 26.6–33)
MCH RBC QN AUTO: 25.3 PG (ref 26.6–33)
MCH RBC QN AUTO: 25.6 PG (ref 26.6–33)
MCHC RBC AUTO-ENTMCNC: 28.1 G/DL (ref 31.5–35.7)
MCHC RBC AUTO-ENTMCNC: 28.2 G/DL (ref 31.5–35.7)
MCHC RBC AUTO-ENTMCNC: 28.4 G/DL (ref 31.5–35.7)
MCV RBC AUTO: 89.3 FL (ref 79–97)
MCV RBC AUTO: 90.1 FL (ref 79–97)
MCV RBC AUTO: 90.2 FL (ref 79–97)
METAMYELOCYTES NFR BLD MANUAL: 1 % (ref 0–0)
METAMYELOCYTES NFR BLD MANUAL: 1 % (ref 0–0)
MICROCYTES BLD QL: ABNORMAL
MODALITY: ABNORMAL
MONOCYTES # BLD: 0.14 10*3/MM3 (ref 0.1–0.9)
MONOCYTES # BLD: 0.38 10*3/MM3 (ref 0.1–0.9)
MONOCYTES # BLD: 0.45 10*3/MM3 (ref 0.1–0.9)
MRSA DNA SPEC QL NAA+PROBE: ABNORMAL
NEGATIVE CONTROL: NEGATIVE
NEUTROPHILS # BLD AUTO: 5.73 10*3/MM3 (ref 1.7–7)
NEUTROPHILS # BLD AUTO: 6.25 10*3/MM3 (ref 1.7–7)
NEUTROPHILS # BLD AUTO: 7.44 10*3/MM3 (ref 1.7–7)
NEUTROPHILS NFR BLD MANUAL: 31 % (ref 42.7–76)
NEUTROPHILS NFR BLD MANUAL: 64 % (ref 42.7–76)
NEUTROPHILS NFR BLD MANUAL: 65.7 % (ref 42.7–76)
NEUTS BAND NFR BLD MANUAL: 17.2 % (ref 0–5)
NEUTS BAND NFR BLD MANUAL: 18 % (ref 0–5)
NEUTS BAND NFR BLD MANUAL: 51 % (ref 0–5)
NEUTS VAC BLD QL SMEAR: ABNORMAL
NRBC SPEC MANUAL: 1 /100 WBC (ref 0–0.2)
NT-PROBNP SERPL-MCNC: 2050 PG/ML (ref 0–900)
PCO2 BLDA: 64.3 MM HG (ref 35–45)
PCO2 TEMP ADJ BLD: 64.3 MM HG (ref 35–45)
PH BLDA: 7.37 PH UNITS (ref 7.35–7.45)
PH, TEMP CORRECTED: 7.37 PH UNITS (ref 7.35–7.45)
PLATELET # BLD AUTO: 121 10*3/MM3 (ref 140–450)
PLATELET # BLD AUTO: 131 10*3/MM3 (ref 140–450)
PLATELET # BLD AUTO: 145 10*3/MM3 (ref 140–450)
PMV BLD AUTO: 11.9 FL (ref 6–12)
PMV BLD AUTO: 12 FL (ref 6–12)
PMV BLD AUTO: 12.3 FL (ref 6–12)
PO2 BLDA: 74.7 MM HG (ref 83–108)
PO2 TEMP ADJ BLD: 74.7 MM HG (ref 83–108)
POIKILOCYTOSIS BLD QL SMEAR: ABNORMAL
POIKILOCYTOSIS BLD QL SMEAR: ABNORMAL
POLYCHROMASIA BLD QL SMEAR: ABNORMAL
POSITIVE CONTROL: POSITIVE
POTASSIUM SERPL-SCNC: 4 MMOL/L (ref 3.5–5.2)
POTASSIUM SERPL-SCNC: 4 MMOL/L (ref 3.5–5.2)
POTASSIUM SERPL-SCNC: 4.8 MMOL/L (ref 3.5–5.2)
PROCALCITONIN SERPL-MCNC: 0.3 NG/ML (ref 0–0.25)
PROT SERPL-MCNC: 7.6 G/DL (ref 6–8.5)
QT INTERVAL: 374 MS
QTC INTERVAL: 431 MS
RBC # BLD AUTO: 3.18 10*6/MM3 (ref 3.77–5.28)
RBC # BLD AUTO: 3.32 10*6/MM3 (ref 3.77–5.28)
RBC # BLD AUTO: 3.47 10*6/MM3 (ref 3.77–5.28)
RHINOVIRUS RNA SPEC NAA+PROBE: NOT DETECTED
ROULEAUX BLD QL SMEAR: ABNORMAL
RSV RNA NPH QL NAA+NON-PROBE: NOT DETECTED
SAO2 % BLDCOA: 95.2 % (ref 94–99)
SARS-COV-2 RNA RESP QL NAA+PROBE: NOT DETECTED
SMALL PLATELETS BLD QL SMEAR: ABNORMAL
SMALL PLATELETS BLD QL SMEAR: ABNORMAL
SODIUM SERPL-SCNC: 139 MMOL/L (ref 136–145)
SODIUM SERPL-SCNC: 143 MMOL/L (ref 136–145)
SODIUM SERPL-SCNC: 146 MMOL/L (ref 136–145)
STOMATOCYTES BLD QL SMEAR: ABNORMAL
TARGETS BLD QL SMEAR: ABNORMAL
TIBC SERPL-MCNC: 273 MCG/DL (ref 298–536)
TRANSFERRIN SERPL-MCNC: 183 MG/DL (ref 200–360)
TROPONIN T NUMERIC DELTA: 0 NG/L
TROPONIN T SERPL HS-MCNC: 31 NG/L
VANCOMYCIN TROUGH SERPL-MCNC: 32.6 MCG/ML (ref 5–20)
VARIANT LYMPHS NFR BLD MANUAL: 11.1 % (ref 19.6–45.3)
VARIANT LYMPHS NFR BLD MANUAL: 12 % (ref 19.6–45.3)
VARIANT LYMPHS NFR BLD MANUAL: 12 % (ref 19.6–45.3)
VENTILATOR MODE: ABNORMAL
VIT B12 BLD-MCNC: >2000 PG/ML (ref 211–946)
WBC MORPH BLD: NORMAL
WBC MORPH BLD: NORMAL
WBC NRBC COR # BLD AUTO: 6.92 10*3/MM3 (ref 3.4–10.8)
WBC NRBC COR # BLD AUTO: 7.62 10*3/MM3 (ref 3.4–10.8)
WBC NRBC COR # BLD AUTO: 9.07 10*3/MM3 (ref 3.4–10.8)
WHOLE BLOOD HOLD COAG: NORMAL
WHOLE BLOOD HOLD SPECIMEN: NORMAL

## 2024-01-01 PROCEDURE — 85025 COMPLETE CBC W/AUTO DIFF WBC: CPT | Performed by: STUDENT IN AN ORGANIZED HEALTH CARE EDUCATION/TRAINING PROGRAM

## 2024-01-01 PROCEDURE — 99232 SBSQ HOSP IP/OBS MODERATE 35: CPT | Performed by: CLINICAL NURSE SPECIALIST

## 2024-01-01 PROCEDURE — 25010000002 CEFEPIME PER 500 MG: Performed by: INTERNAL MEDICINE

## 2024-01-01 PROCEDURE — 25010000002 ONDANSETRON PER 1 MG: Performed by: STUDENT IN AN ORGANIZED HEALTH CARE EDUCATION/TRAINING PROGRAM

## 2024-01-01 PROCEDURE — 99497 ADVNCD CARE PLAN 30 MIN: CPT | Performed by: CLINICAL NURSE SPECIALIST

## 2024-01-01 PROCEDURE — 82803 BLOOD GASES ANY COMBINATION: CPT

## 2024-01-01 PROCEDURE — 71045 X-RAY EXAM CHEST 1 VIEW: CPT

## 2024-01-01 PROCEDURE — 25010000002 VANCOMYCIN 1250 MG/250ML SOLUTION: Performed by: PHYSICIAN ASSISTANT

## 2024-01-01 PROCEDURE — 84484 ASSAY OF TROPONIN QUANT: CPT | Performed by: INTERNAL MEDICINE

## 2024-01-01 PROCEDURE — 25010000002 MORPHINE PER 10 MG: Performed by: CLINICAL NURSE SPECIALIST

## 2024-01-01 PROCEDURE — 84484 ASSAY OF TROPONIN QUANT: CPT | Performed by: PHYSICIAN ASSISTANT

## 2024-01-01 PROCEDURE — 93005 ELECTROCARDIOGRAM TRACING: CPT | Performed by: INTERNAL MEDICINE

## 2024-01-01 PROCEDURE — 99223 1ST HOSP IP/OBS HIGH 75: CPT | Performed by: CLINICAL NURSE SPECIALIST

## 2024-01-01 PROCEDURE — 25010000002 CEFEPIME PER 500 MG: Performed by: STUDENT IN AN ORGANIZED HEALTH CARE EDUCATION/TRAINING PROGRAM

## 2024-01-01 PROCEDURE — 25010000002 PIPERACILLIN SOD-TAZOBACTAM PER 1 G: Performed by: PHYSICIAN ASSISTANT

## 2024-01-01 PROCEDURE — 80048 BASIC METABOLIC PNL TOTAL CA: CPT | Performed by: INTERNAL MEDICINE

## 2024-01-01 PROCEDURE — 25010000002 LORAZEPAM PER 2 MG: Performed by: CLINICAL NURSE SPECIALIST

## 2024-01-01 PROCEDURE — 36415 COLL VENOUS BLD VENIPUNCTURE: CPT

## 2024-01-01 PROCEDURE — 83605 ASSAY OF LACTIC ACID: CPT | Performed by: PHYSICIAN ASSISTANT

## 2024-01-01 PROCEDURE — 82948 REAGENT STRIP/BLOOD GLUCOSE: CPT

## 2024-01-01 PROCEDURE — 82607 VITAMIN B-12: CPT | Performed by: STUDENT IN AN ORGANIZED HEALTH CARE EDUCATION/TRAINING PROGRAM

## 2024-01-01 PROCEDURE — 85025 COMPLETE CBC W/AUTO DIFF WBC: CPT | Performed by: PHYSICIAN ASSISTANT

## 2024-01-01 PROCEDURE — 36600 WITHDRAWAL OF ARTERIAL BLOOD: CPT

## 2024-01-01 PROCEDURE — 80053 COMPREHEN METABOLIC PANEL: CPT | Performed by: PHYSICIAN ASSISTANT

## 2024-01-01 PROCEDURE — 80156 ASSAY CARBAMAZEPINE TOTAL: CPT | Performed by: PHYSICIAN ASSISTANT

## 2024-01-01 PROCEDURE — 25010000002 VANCOMYCIN 1250 MG/250ML SOLUTION: Performed by: STUDENT IN AN ORGANIZED HEALTH CARE EDUCATION/TRAINING PROGRAM

## 2024-01-01 PROCEDURE — 85025 COMPLETE CBC W/AUTO DIFF WBC: CPT | Performed by: INTERNAL MEDICINE

## 2024-01-01 PROCEDURE — 87641 MR-STAPH DNA AMP PROBE: CPT | Performed by: STUDENT IN AN ORGANIZED HEALTH CARE EDUCATION/TRAINING PROGRAM

## 2024-01-01 PROCEDURE — 93005 ELECTROCARDIOGRAM TRACING: CPT

## 2024-01-01 PROCEDURE — 36415 COLL VENOUS BLD VENIPUNCTURE: CPT | Performed by: INTERNAL MEDICINE

## 2024-01-01 PROCEDURE — 84145 PROCALCITONIN (PCT): CPT | Performed by: PHYSICIAN ASSISTANT

## 2024-01-01 PROCEDURE — 87040 BLOOD CULTURE FOR BACTERIA: CPT | Performed by: PHYSICIAN ASSISTANT

## 2024-01-01 PROCEDURE — 94799 UNLISTED PULMONARY SVC/PX: CPT

## 2024-01-01 PROCEDURE — 85007 BL SMEAR W/DIFF WBC COUNT: CPT | Performed by: INTERNAL MEDICINE

## 2024-01-01 PROCEDURE — 25010000002 MORPHINE PER 10 MG: Performed by: STUDENT IN AN ORGANIZED HEALTH CARE EDUCATION/TRAINING PROGRAM

## 2024-01-01 PROCEDURE — 85007 BL SMEAR W/DIFF WBC COUNT: CPT | Performed by: PHYSICIAN ASSISTANT

## 2024-01-01 PROCEDURE — 25010000002 ENOXAPARIN PER 10 MG: Performed by: INTERNAL MEDICINE

## 2024-01-01 PROCEDURE — 87070 CULTURE OTHR SPECIMN AEROBIC: CPT | Performed by: PHYSICIAN ASSISTANT

## 2024-01-01 PROCEDURE — 84466 ASSAY OF TRANSFERRIN: CPT | Performed by: STUDENT IN AN ORGANIZED HEALTH CARE EDUCATION/TRAINING PROGRAM

## 2024-01-01 PROCEDURE — 87186 SC STD MICRODIL/AGAR DIL: CPT | Performed by: PHYSICIAN ASSISTANT

## 2024-01-01 PROCEDURE — 94761 N-INVAS EAR/PLS OXIMETRY MLT: CPT

## 2024-01-01 PROCEDURE — 85007 BL SMEAR W/DIFF WBC COUNT: CPT | Performed by: STUDENT IN AN ORGANIZED HEALTH CARE EDUCATION/TRAINING PROGRAM

## 2024-01-01 PROCEDURE — 80048 BASIC METABOLIC PNL TOTAL CA: CPT | Performed by: STUDENT IN AN ORGANIZED HEALTH CARE EDUCATION/TRAINING PROGRAM

## 2024-01-01 PROCEDURE — 94664 DEMO&/EVAL PT USE INHALER: CPT

## 2024-01-01 PROCEDURE — 80202 ASSAY OF VANCOMYCIN: CPT | Performed by: INTERNAL MEDICINE

## 2024-01-01 PROCEDURE — 87205 SMEAR GRAM STAIN: CPT | Performed by: PHYSICIAN ASSISTANT

## 2024-01-01 PROCEDURE — 83540 ASSAY OF IRON: CPT | Performed by: STUDENT IN AN ORGANIZED HEALTH CARE EDUCATION/TRAINING PROGRAM

## 2024-01-01 PROCEDURE — 25810000003 SODIUM CHLORIDE 0.9 % SOLUTION: Performed by: INTERNAL MEDICINE

## 2024-01-01 PROCEDURE — 82728 ASSAY OF FERRITIN: CPT | Performed by: STUDENT IN AN ORGANIZED HEALTH CARE EDUCATION/TRAINING PROGRAM

## 2024-01-01 PROCEDURE — 83880 ASSAY OF NATRIURETIC PEPTIDE: CPT | Performed by: PHYSICIAN ASSISTANT

## 2024-01-01 PROCEDURE — 94640 AIRWAY INHALATION TREATMENT: CPT

## 2024-01-01 PROCEDURE — 93010 ELECTROCARDIOGRAM REPORT: CPT | Performed by: INTERNAL MEDICINE

## 2024-01-01 PROCEDURE — 82270 OCCULT BLOOD FECES: CPT | Performed by: PHYSICIAN ASSISTANT

## 2024-01-01 PROCEDURE — 0202U NFCT DS 22 TRGT SARS-COV-2: CPT | Performed by: PHYSICIAN ASSISTANT

## 2024-01-01 PROCEDURE — 99285 EMERGENCY DEPT VISIT HI MDM: CPT

## 2024-01-01 PROCEDURE — 25010000002 VANCOMYCIN 1250 MG/250ML SOLUTION: Performed by: INTERNAL MEDICINE

## 2024-01-01 PROCEDURE — 87077 CULTURE AEROBIC IDENTIFY: CPT | Performed by: PHYSICIAN ASSISTANT

## 2024-01-01 PROCEDURE — 82746 ASSAY OF FOLIC ACID SERUM: CPT | Performed by: STUDENT IN AN ORGANIZED HEALTH CARE EDUCATION/TRAINING PROGRAM

## 2024-01-01 RX ORDER — IPRATROPIUM BROMIDE AND ALBUTEROL SULFATE 2.5; .5 MG/3ML; MG/3ML
3 SOLUTION RESPIRATORY (INHALATION)
Status: DISCONTINUED | OUTPATIENT
Start: 2024-01-01 | End: 2024-01-01

## 2024-01-01 RX ORDER — MICONAZOLE NITRATE 20 MG/G
1 CREAM TOPICAL EVERY 12 HOURS SCHEDULED
Status: DISCONTINUED | OUTPATIENT
Start: 2024-01-01 | End: 2024-01-01

## 2024-01-01 RX ORDER — ACETAMINOPHEN 325 MG/1
650 TABLET ORAL EVERY 4 HOURS PRN
Status: DISCONTINUED | OUTPATIENT
Start: 2024-01-01 | End: 2024-01-01 | Stop reason: HOSPADM

## 2024-01-01 RX ORDER — OXYCODONE AND ACETAMINOPHEN 10; 325 MG/1; MG/1
1 TABLET ORAL ONCE
Status: DISCONTINUED | OUTPATIENT
Start: 2024-01-01 | End: 2024-01-01

## 2024-01-01 RX ORDER — MIDODRINE HYDROCHLORIDE 2.5 MG/1
5 TABLET ORAL
Status: DISCONTINUED | OUTPATIENT
Start: 2024-01-01 | End: 2024-01-01 | Stop reason: HOSPADM

## 2024-01-01 RX ORDER — ACETAMINOPHEN 160 MG/5ML
650 SOLUTION ORAL ONCE
Status: COMPLETED | OUTPATIENT
Start: 2024-01-01 | End: 2024-01-01

## 2024-01-01 RX ORDER — FLUTICASONE PROPIONATE 50 MCG
2 SPRAY, SUSPENSION (ML) NASAL DAILY
Status: DISCONTINUED | OUTPATIENT
Start: 2024-01-01 | End: 2024-01-01 | Stop reason: HOSPADM

## 2024-01-01 RX ORDER — ASCORBIC ACID 500 MG
500 TABLET ORAL 2 TIMES DAILY
Status: DISCONTINUED | OUTPATIENT
Start: 2024-01-01 | End: 2024-01-01 | Stop reason: HOSPADM

## 2024-01-01 RX ORDER — ALPRAZOLAM 0.5 MG
0.5 TABLET ORAL 3 TIMES DAILY PRN
Status: DISCONTINUED | OUTPATIENT
Start: 2024-01-01 | End: 2024-01-01 | Stop reason: HOSPADM

## 2024-01-01 RX ORDER — GINGER ROOT/GINGER ROOT EXT 262.5 MG
1 CAPSULE ORAL DAILY
Status: DISCONTINUED | OUTPATIENT
Start: 2024-01-01 | End: 2024-01-01 | Stop reason: ALTCHOICE

## 2024-01-01 RX ORDER — MULTIPLE VITAMINS W/ MINERALS TAB 9MG-400MCG
1 TAB ORAL DAILY
COMMUNITY

## 2024-01-01 RX ORDER — ZINC SULFATE 50(220)MG
220 CAPSULE ORAL DAILY
Status: DISCONTINUED | OUTPATIENT
Start: 2024-01-01 | End: 2024-01-01 | Stop reason: HOSPADM

## 2024-01-01 RX ORDER — MORPHINE SULFATE 20 MG/ML
20 SOLUTION ORAL
Status: DISCONTINUED | OUTPATIENT
Start: 2024-01-01 | End: 2024-01-01 | Stop reason: HOSPADM

## 2024-01-01 RX ORDER — BACLOFEN 10 MG/1
10 TABLET ORAL EVERY 6 HOURS SCHEDULED
Status: DISCONTINUED | OUTPATIENT
Start: 2024-01-01 | End: 2024-01-01

## 2024-01-01 RX ORDER — HALOPERIDOL 5 MG/ML
2 INJECTION INTRAMUSCULAR EVERY 4 HOURS PRN
Status: DISCONTINUED | OUTPATIENT
Start: 2024-01-01 | End: 2024-01-01 | Stop reason: HOSPADM

## 2024-01-01 RX ORDER — ZOLPIDEM TARTRATE 5 MG/1
10 TABLET ORAL DAILY
Status: DISCONTINUED | OUTPATIENT
Start: 2024-01-01 | End: 2024-01-01

## 2024-01-01 RX ORDER — FUROSEMIDE 10 MG/ML
20 INJECTION INTRAMUSCULAR; INTRAVENOUS EVERY 6 HOURS PRN
Status: DISCONTINUED | OUTPATIENT
Start: 2024-01-01 | End: 2024-01-01 | Stop reason: HOSPADM

## 2024-01-01 RX ORDER — ONDANSETRON 2 MG/ML
4 INJECTION INTRAMUSCULAR; INTRAVENOUS EVERY 6 HOURS PRN
Status: DISCONTINUED | OUTPATIENT
Start: 2024-01-01 | End: 2024-01-01 | Stop reason: HOSPADM

## 2024-01-01 RX ORDER — ALPRAZOLAM 0.5 MG
0.5 TABLET ORAL 4 TIMES DAILY
Status: DISCONTINUED | OUTPATIENT
Start: 2024-01-01 | End: 2024-01-01 | Stop reason: HOSPADM

## 2024-01-01 RX ORDER — IPRATROPIUM BROMIDE 21 UG/1
1-2 SPRAY, METERED NASAL EVERY 8 HOURS PRN
COMMUNITY

## 2024-01-01 RX ORDER — MORPHINE SULFATE 20 MG/ML
10 SOLUTION ORAL
Status: DISCONTINUED | OUTPATIENT
Start: 2024-01-01 | End: 2024-01-01 | Stop reason: HOSPADM

## 2024-01-01 RX ORDER — SODIUM CHLORIDE 0.9 % (FLUSH) 0.9 %
10 SYRINGE (ML) INJECTION EVERY 12 HOURS SCHEDULED
Status: DISCONTINUED | OUTPATIENT
Start: 2024-01-01 | End: 2024-01-01 | Stop reason: HOSPADM

## 2024-01-01 RX ORDER — SCOLOPAMINE TRANSDERMAL SYSTEM 1 MG/1
1 PATCH, EXTENDED RELEASE TRANSDERMAL
Status: DISCONTINUED | OUTPATIENT
Start: 2024-01-01 | End: 2024-01-01 | Stop reason: HOSPADM

## 2024-01-01 RX ORDER — FENTANYL 25 UG/1
1 PATCH TRANSDERMAL
Status: DISCONTINUED | OUTPATIENT
Start: 2024-01-01 | End: 2024-01-01 | Stop reason: HOSPADM

## 2024-01-01 RX ORDER — GUAIFENESIN 200 MG/10ML
800 LIQUID ORAL 2 TIMES DAILY
COMMUNITY

## 2024-01-01 RX ORDER — PROCHLORPERAZINE 25 MG
25 SUPPOSITORY, RECTAL RECTAL EVERY 12 HOURS PRN
Status: DISCONTINUED | OUTPATIENT
Start: 2024-01-01 | End: 2024-01-01 | Stop reason: HOSPADM

## 2024-01-01 RX ORDER — VANCOMYCIN 1 G/200ML
1000 INJECTION, SOLUTION INTRAVENOUS EVERY 12 HOURS
Status: DISCONTINUED | OUTPATIENT
Start: 2024-01-01 | End: 2024-01-01 | Stop reason: SDUPTHER

## 2024-01-01 RX ORDER — FUROSEMIDE 40 MG/1
40 TABLET ORAL EVERY OTHER DAY
Status: DISCONTINUED | OUTPATIENT
Start: 2024-01-01 | End: 2024-01-01

## 2024-01-01 RX ORDER — ALBUTEROL SULFATE 90 UG/1
2 INHALANT RESPIRATORY (INHALATION) EVERY 6 HOURS PRN
COMMUNITY

## 2024-01-01 RX ORDER — CETIRIZINE HYDROCHLORIDE 10 MG/1
10 TABLET ORAL DAILY
COMMUNITY

## 2024-01-01 RX ORDER — BISACODYL 10 MG
10 SUPPOSITORY, RECTAL RECTAL DAILY PRN
Status: DISCONTINUED | OUTPATIENT
Start: 2024-01-01 | End: 2024-01-01 | Stop reason: HOSPADM

## 2024-01-01 RX ORDER — LORAZEPAM 1 MG/1
2 TABLET ORAL
Status: DISCONTINUED | OUTPATIENT
Start: 2024-01-01 | End: 2024-01-01

## 2024-01-01 RX ORDER — ONDANSETRON 4 MG/1
4 TABLET, ORALLY DISINTEGRATING ORAL EVERY 6 HOURS PRN
Status: DISCONTINUED | OUTPATIENT
Start: 2024-01-01 | End: 2024-01-01 | Stop reason: HOSPADM

## 2024-01-01 RX ORDER — FUROSEMIDE 20 MG/1
20 TABLET ORAL DAILY
COMMUNITY

## 2024-01-01 RX ORDER — DIPHENHYDRAMINE HYDROCHLORIDE AND LIDOCAINE HYDROCHLORIDE AND ALUMINUM HYDROXIDE AND MAGNESIUM HYDRO
5 KIT EVERY 4 HOURS PRN
Status: DISCONTINUED | OUTPATIENT
Start: 2024-01-01 | End: 2024-01-01 | Stop reason: HOSPADM

## 2024-01-01 RX ORDER — ATORVASTATIN CALCIUM 40 MG/1
40 TABLET, FILM COATED ORAL DAILY
COMMUNITY

## 2024-01-01 RX ORDER — DIPHENHYDRAMINE HYDROCHLORIDE 50 MG/ML
25 INJECTION INTRAMUSCULAR; INTRAVENOUS EVERY 6 HOURS PRN
Status: DISCONTINUED | OUTPATIENT
Start: 2024-01-01 | End: 2024-01-01 | Stop reason: HOSPADM

## 2024-01-01 RX ORDER — POLYETHYLENE GLYCOL 3350 17 G/17G
17 POWDER, FOR SOLUTION ORAL DAILY PRN
Status: DISCONTINUED | OUTPATIENT
Start: 2024-01-01 | End: 2024-01-01 | Stop reason: HOSPADM

## 2024-01-01 RX ORDER — OXYCODONE AND ACETAMINOPHEN 10; 325 MG/1; MG/1
1.5 TABLET ORAL EVERY 4 HOURS
Status: DISCONTINUED | OUTPATIENT
Start: 2024-01-01 | End: 2024-01-01

## 2024-01-01 RX ORDER — HALOPERIDOL 5 MG/ML
1 INJECTION INTRAMUSCULAR EVERY 4 HOURS PRN
Status: DISCONTINUED | OUTPATIENT
Start: 2024-01-01 | End: 2024-01-01 | Stop reason: HOSPADM

## 2024-01-01 RX ORDER — ROFLUMILAST 500 UG/1
250 TABLET ORAL DAILY
Status: DISCONTINUED | OUTPATIENT
Start: 2024-01-01 | End: 2024-01-01 | Stop reason: HOSPADM

## 2024-01-01 RX ORDER — SODIUM CHLORIDE 0.9 % (FLUSH) 0.9 %
10 SYRINGE (ML) INJECTION AS NEEDED
Status: DISCONTINUED | OUTPATIENT
Start: 2024-01-01 | End: 2024-01-01 | Stop reason: HOSPADM

## 2024-01-01 RX ORDER — GUAIFENESIN 200 MG/10ML
800 LIQUID ORAL 2 TIMES DAILY
Status: DISCONTINUED | OUTPATIENT
Start: 2024-01-01 | End: 2024-01-01

## 2024-01-01 RX ORDER — IPRATROPIUM BROMIDE AND ALBUTEROL SULFATE 2.5; .5 MG/3ML; MG/3ML
3 SOLUTION RESPIRATORY (INHALATION) EVERY 4 HOURS PRN
Status: DISCONTINUED | OUTPATIENT
Start: 2024-01-01 | End: 2024-01-01 | Stop reason: HOSPADM

## 2024-01-01 RX ORDER — LORAZEPAM 1 MG/1
1 TABLET ORAL
Status: DISCONTINUED | OUTPATIENT
Start: 2024-01-01 | End: 2024-01-01

## 2024-01-01 RX ORDER — SODIUM CHLORIDE 0.9 % (FLUSH) 0.9 %
10 SYRINGE (ML) INJECTION AS NEEDED
Status: DISCONTINUED | OUTPATIENT
Start: 2024-01-01 | End: 2024-01-01

## 2024-01-01 RX ORDER — IPRATROPIUM BROMIDE AND ALBUTEROL SULFATE 2.5; .5 MG/3ML; MG/3ML
3 SOLUTION RESPIRATORY (INHALATION) 2 TIMES DAILY
COMMUNITY

## 2024-01-01 RX ORDER — SENNOSIDES 8.6 MG
650 CAPSULE ORAL EVERY 8 HOURS PRN
COMMUNITY

## 2024-01-01 RX ORDER — SODIUM HYPOCHLORITE 1.25 MG/ML
SOLUTION TOPICAL EVERY 12 HOURS
Status: DISCONTINUED | OUTPATIENT
Start: 2024-01-01 | End: 2024-01-01 | Stop reason: HOSPADM

## 2024-01-01 RX ORDER — POLYVINYL ALCOHOL 14 MG/ML
1 SOLUTION/ DROPS OPHTHALMIC 2 TIMES DAILY
COMMUNITY

## 2024-01-01 RX ORDER — VANCOMYCIN 1.75 G/350ML
1250 INJECTION, SOLUTION INTRAVENOUS EVERY 12 HOURS
Status: DISCONTINUED | OUTPATIENT
Start: 2024-01-01 | End: 2024-01-01

## 2024-01-01 RX ORDER — PREGABALIN 75 MG/1
150 CAPSULE ORAL EVERY 12 HOURS SCHEDULED
Status: DISCONTINUED | OUTPATIENT
Start: 2024-01-01 | End: 2024-01-01 | Stop reason: HOSPADM

## 2024-01-01 RX ORDER — CARBAMAZEPINE 100 MG/5ML
200 SUSPENSION ORAL 2 TIMES DAILY
Status: DISCONTINUED | OUTPATIENT
Start: 2024-01-01 | End: 2024-01-01

## 2024-01-01 RX ORDER — SODIUM CHLORIDE 9 MG/ML
40 INJECTION, SOLUTION INTRAVENOUS AS NEEDED
Status: DISCONTINUED | OUTPATIENT
Start: 2024-01-01 | End: 2024-01-01 | Stop reason: HOSPADM

## 2024-01-01 RX ORDER — CHOLECALCIFEROL (VITAMIN D3) 25 MCG
1000 TABLET ORAL DAILY
Status: DISCONTINUED | OUTPATIENT
Start: 2024-01-01 | End: 2024-01-01 | Stop reason: HOSPADM

## 2024-01-01 RX ORDER — ZOLPIDEM TARTRATE 5 MG/1
10 TABLET ORAL NIGHTLY
Status: DISCONTINUED | OUTPATIENT
Start: 2024-01-01 | End: 2024-01-01 | Stop reason: HOSPADM

## 2024-01-01 RX ORDER — VANCOMYCIN 1.75 G/350ML
1250 INJECTION, SOLUTION INTRAVENOUS EVERY 24 HOURS
Status: DISCONTINUED | OUTPATIENT
Start: 2024-01-01 | End: 2024-01-01

## 2024-01-01 RX ORDER — VANCOMYCIN 1.75 G/350ML
20 INJECTION, SOLUTION INTRAVENOUS ONCE
Status: COMPLETED | OUTPATIENT
Start: 2024-01-01 | End: 2024-01-01

## 2024-01-01 RX ORDER — OXYCODONE AND ACETAMINOPHEN 10; 325 MG/1; MG/1
1 TABLET ORAL EVERY 6 HOURS PRN
Status: DISCONTINUED | OUTPATIENT
Start: 2024-01-01 | End: 2024-01-01 | Stop reason: HOSPADM

## 2024-01-01 RX ORDER — BACLOFEN 10 MG/1
10 TABLET ORAL EVERY 6 HOURS SCHEDULED
Status: DISCONTINUED | OUTPATIENT
Start: 2024-01-01 | End: 2024-01-01 | Stop reason: HOSPADM

## 2024-01-01 RX ORDER — HALOPERIDOL 2 MG/1
2 TABLET ORAL EVERY 4 HOURS PRN
Status: DISCONTINUED | OUTPATIENT
Start: 2024-01-01 | End: 2024-01-01 | Stop reason: HOSPADM

## 2024-01-01 RX ORDER — CARBAMAZEPINE 100 MG/5ML
200 SUSPENSION ORAL 2 TIMES DAILY
COMMUNITY

## 2024-01-01 RX ORDER — ARGININE/GLUTAMINE/CALCIUM BMB 7G-7G-1.5G
1 POWDER IN PACKET (EA) ORAL 2 TIMES DAILY
Status: DISCONTINUED | OUTPATIENT
Start: 2024-01-01 | End: 2024-01-01 | Stop reason: HOSPADM

## 2024-01-01 RX ORDER — DEXTROMETHORPHAN POLISTIREX 30 MG/5ML
30 SUSPENSION ORAL EVERY 6 HOURS PRN
COMMUNITY

## 2024-01-01 RX ORDER — GUAIFENESIN 200 MG/10ML
400 LIQUID ORAL EVERY 6 HOURS
Status: DISCONTINUED | OUTPATIENT
Start: 2024-01-01 | End: 2024-01-01

## 2024-01-01 RX ORDER — MORPHINE SULFATE 2 MG/ML
2 INJECTION, SOLUTION INTRAMUSCULAR; INTRAVENOUS ONCE
Status: COMPLETED | OUTPATIENT
Start: 2024-01-01 | End: 2024-01-01

## 2024-01-01 RX ORDER — BACLOFEN 10 MG/1
10 TABLET ORAL EVERY 12 HOURS SCHEDULED
Status: DISCONTINUED | OUTPATIENT
Start: 2024-01-01 | End: 2024-01-01

## 2024-01-01 RX ORDER — DIPHENOXYLATE HYDROCHLORIDE AND ATROPINE SULFATE 2.5; .025 MG/1; MG/1
1 TABLET ORAL
Status: DISCONTINUED | OUTPATIENT
Start: 2024-01-01 | End: 2024-01-01 | Stop reason: HOSPADM

## 2024-01-01 RX ORDER — FUROSEMIDE 20 MG/1
20 TABLET ORAL EVERY OTHER DAY
Status: DISCONTINUED | OUTPATIENT
Start: 2024-01-01 | End: 2024-01-01 | Stop reason: HOSPADM

## 2024-01-01 RX ORDER — FUROSEMIDE 40 MG/1
40 TABLET ORAL DAILY
COMMUNITY

## 2024-01-01 RX ORDER — ATROPINE SULFATE 10 MG/ML
2 SOLUTION/ DROPS OPHTHALMIC 2 TIMES DAILY PRN
Status: DISCONTINUED | OUTPATIENT
Start: 2024-01-01 | End: 2024-01-01 | Stop reason: HOSPADM

## 2024-01-01 RX ORDER — LORAZEPAM 2 MG/ML
1 INJECTION INTRAMUSCULAR
Status: DISCONTINUED | OUTPATIENT
Start: 2024-01-01 | End: 2024-01-01

## 2024-01-01 RX ORDER — HALOPERIDOL 1 MG/1
1 TABLET ORAL EVERY 4 HOURS PRN
Status: DISCONTINUED | OUTPATIENT
Start: 2024-01-01 | End: 2024-01-01 | Stop reason: HOSPADM

## 2024-01-01 RX ORDER — PENTOXIFYLLINE 400 MG/1
400 TABLET, EXTENDED RELEASE ORAL
Status: DISCONTINUED | OUTPATIENT
Start: 2024-01-01 | End: 2024-01-01 | Stop reason: HOSPADM

## 2024-01-01 RX ORDER — POTASSIUM CHLORIDE 750 MG/1
10 CAPSULE, EXTENDED RELEASE ORAL DAILY
Status: DISCONTINUED | OUTPATIENT
Start: 2024-01-01 | End: 2024-01-01 | Stop reason: HOSPADM

## 2024-01-01 RX ORDER — NITROGLYCERIN 0.4 MG/1
0.4 TABLET SUBLINGUAL
Status: DISCONTINUED | OUTPATIENT
Start: 2024-01-01 | End: 2024-01-01 | Stop reason: HOSPADM

## 2024-01-01 RX ORDER — VANCOMYCIN 1.75 G/350ML
1250 INJECTION, SOLUTION INTRAVENOUS EVERY 24 HOURS
Status: COMPLETED | OUTPATIENT
Start: 2024-01-01 | End: 2024-01-01

## 2024-01-01 RX ORDER — CETIRIZINE HYDROCHLORIDE 10 MG/1
10 TABLET ORAL DAILY
Status: DISCONTINUED | OUTPATIENT
Start: 2024-01-01 | End: 2024-01-01 | Stop reason: HOSPADM

## 2024-01-01 RX ORDER — FUROSEMIDE 40 MG/1
40 TABLET ORAL EVERY OTHER DAY
Status: DISCONTINUED | OUTPATIENT
Start: 2024-01-01 | End: 2024-01-01 | Stop reason: HOSPADM

## 2024-01-01 RX ORDER — BACLOFEN 10 MG/1
10 TABLET ORAL 4 TIMES DAILY
Status: DISCONTINUED | OUTPATIENT
Start: 2024-01-01 | End: 2024-01-01

## 2024-01-01 RX ORDER — CARBAMAZEPINE 100 MG/5ML
200 SUSPENSION ORAL 2 TIMES DAILY
Status: DISCONTINUED | OUTPATIENT
Start: 2024-01-01 | End: 2024-01-01 | Stop reason: HOSPADM

## 2024-01-01 RX ORDER — BUDESONIDE AND FORMOTEROL FUMARATE DIHYDRATE 160; 4.5 UG/1; UG/1
2 AEROSOL RESPIRATORY (INHALATION)
Status: DISCONTINUED | OUTPATIENT
Start: 2024-01-01 | End: 2024-01-01

## 2024-01-01 RX ORDER — OXYCODONE AND ACETAMINOPHEN 10; 325 MG/1; MG/1
1 TABLET ORAL ONCE AS NEEDED
Status: COMPLETED | OUTPATIENT
Start: 2024-01-01 | End: 2024-01-01

## 2024-01-01 RX ORDER — POTASSIUM CHLORIDE 20MEQ/15ML
7.5 LIQUID (ML) ORAL 2 TIMES DAILY
COMMUNITY

## 2024-01-01 RX ORDER — ENOXAPARIN SODIUM 100 MG/ML
40 INJECTION SUBCUTANEOUS DAILY
Status: DISCONTINUED | OUTPATIENT
Start: 2024-01-01 | End: 2024-01-01

## 2024-01-01 RX ORDER — CALCIUM CARBONATE 500(1250)
500 TABLET ORAL DAILY
Status: DISCONTINUED | OUTPATIENT
Start: 2024-01-01 | End: 2024-01-01 | Stop reason: HOSPADM

## 2024-01-01 RX ORDER — LORAZEPAM 2 MG/ML
2 INJECTION INTRAMUSCULAR
Status: DISCONTINUED | OUTPATIENT
Start: 2024-01-01 | End: 2024-01-01

## 2024-01-01 RX ORDER — PROCHLORPERAZINE EDISYLATE 5 MG/ML
5 INJECTION INTRAMUSCULAR; INTRAVENOUS EVERY 6 HOURS PRN
Status: DISCONTINUED | OUTPATIENT
Start: 2024-01-01 | End: 2024-01-01 | Stop reason: HOSPADM

## 2024-01-01 RX ORDER — PROCHLORPERAZINE MALEATE 10 MG
5 TABLET ORAL EVERY 6 HOURS PRN
Status: DISCONTINUED | OUTPATIENT
Start: 2024-01-01 | End: 2024-01-01 | Stop reason: HOSPADM

## 2024-01-01 RX ORDER — ONDANSETRON 4 MG/1
4 TABLET, FILM COATED ORAL DAILY
COMMUNITY

## 2024-01-01 RX ORDER — SODIUM CHLORIDE 9 MG/ML
100 INJECTION, SOLUTION INTRAVENOUS CONTINUOUS
Status: DISPENSED | OUTPATIENT
Start: 2024-01-01 | End: 2024-01-01

## 2024-01-01 RX ORDER — ESCITALOPRAM OXALATE 10 MG/1
20 TABLET ORAL DAILY
Status: DISCONTINUED | OUTPATIENT
Start: 2024-01-01 | End: 2024-01-01 | Stop reason: HOSPADM

## 2024-01-01 RX ORDER — GUAIFENESIN 200 MG/10ML
400 LIQUID ORAL EVERY 6 HOURS
Status: DISCONTINUED | OUTPATIENT
Start: 2024-01-01 | End: 2024-01-01 | Stop reason: HOSPADM

## 2024-01-01 RX ORDER — AMOXICILLIN 250 MG
2 CAPSULE ORAL 2 TIMES DAILY
Status: DISCONTINUED | OUTPATIENT
Start: 2024-01-01 | End: 2024-01-01 | Stop reason: HOSPADM

## 2024-01-01 RX ORDER — DULOXETIN HYDROCHLORIDE 30 MG/1
60 CAPSULE, DELAYED RELEASE ORAL DAILY
Status: DISCONTINUED | OUTPATIENT
Start: 2024-01-01 | End: 2024-01-01 | Stop reason: HOSPADM

## 2024-01-01 RX ORDER — ATORVASTATIN CALCIUM 40 MG/1
40 TABLET, FILM COATED ORAL NIGHTLY
Status: DISCONTINUED | OUTPATIENT
Start: 2024-01-01 | End: 2024-01-01 | Stop reason: HOSPADM

## 2024-01-01 RX ORDER — DIPHENHYDRAMINE HCL 25 MG
25 CAPSULE ORAL EVERY 6 HOURS PRN
Status: DISCONTINUED | OUTPATIENT
Start: 2024-01-01 | End: 2024-01-01 | Stop reason: HOSPADM

## 2024-01-01 RX ORDER — CARBAMAZEPINE 200 MG/1
200 TABLET ORAL 3 TIMES DAILY
Status: DISCONTINUED | OUTPATIENT
Start: 2024-01-01 | End: 2024-01-01

## 2024-01-01 RX ORDER — ACETAMINOPHEN 650 MG/1
650 SUPPOSITORY RECTAL EVERY 4 HOURS PRN
Status: DISCONTINUED | OUTPATIENT
Start: 2024-01-01 | End: 2024-01-01 | Stop reason: HOSPADM

## 2024-01-01 RX ORDER — BISACODYL 5 MG/1
5 TABLET, DELAYED RELEASE ORAL DAILY PRN
Status: DISCONTINUED | OUTPATIENT
Start: 2024-01-01 | End: 2024-01-01 | Stop reason: HOSPADM

## 2024-01-01 RX ORDER — CELECOXIB 200 MG/1
200 CAPSULE ORAL EVERY 12 HOURS SCHEDULED
Status: DISCONTINUED | OUTPATIENT
Start: 2024-01-01 | End: 2024-01-01 | Stop reason: HOSPADM

## 2024-01-01 RX ORDER — MULTIPLE VITAMINS W/ MINERALS TAB 9MG-400MCG
1 TAB ORAL DAILY
Status: DISCONTINUED | OUTPATIENT
Start: 2024-01-01 | End: 2024-01-01 | Stop reason: HOSPADM

## 2024-01-01 RX ORDER — ALPRAZOLAM 0.5 MG
0.5 TABLET ORAL 3 TIMES DAILY PRN
Status: DISCONTINUED | OUTPATIENT
Start: 2024-01-01 | End: 2024-01-01

## 2024-01-01 RX ADMIN — DICLOFENAC SODIUM 4 G: 10 GEL TOPICAL at 09:42

## 2024-01-01 RX ADMIN — DULOXETINE HYDROCHLORIDE 60 MG: 30 CAPSULE, DELAYED RELEASE ORAL at 09:39

## 2024-01-01 RX ADMIN — ACETAMINOPHEN 650 MG: 650 SOLUTION ORAL at 17:37

## 2024-01-01 RX ADMIN — GUAIFENESIN 400 MG: 200 SOLUTION ORAL at 18:30

## 2024-01-01 RX ADMIN — LORAZEPAM 1 MG: 2 INJECTION INTRAMUSCULAR; INTRAVENOUS at 16:12

## 2024-01-01 RX ADMIN — ESCITALOPRAM OXALATE 20 MG: 10 TABLET ORAL at 16:11

## 2024-01-01 RX ADMIN — Medication 10 ML: at 21:32

## 2024-01-01 RX ADMIN — ALPRAZOLAM 0.5 MG: 0.5 TABLET ORAL at 17:18

## 2024-01-01 RX ADMIN — BUDESONIDE AND FORMOTEROL FUMARATE DIHYDRATE 2 PUFF: 160; 4.5 AEROSOL RESPIRATORY (INHALATION) at 06:59

## 2024-01-01 RX ADMIN — OXYCODONE AND ACETAMINOPHEN 1 TABLET: 325; 10 TABLET ORAL at 01:17

## 2024-01-01 RX ADMIN — ATORVASTATIN CALCIUM 40 MG: 40 TABLET, FILM COATED ORAL at 21:15

## 2024-01-01 RX ADMIN — ZOLPIDEM TARTRATE 10 MG: 5 TABLET ORAL at 00:26

## 2024-01-01 RX ADMIN — OXYCODONE AND ACETAMINOPHEN 1 TABLET: 325; 10 TABLET ORAL at 06:35

## 2024-01-01 RX ADMIN — POTASSIUM CHLORIDE 10 MEQ: 750 CAPSULE, EXTENDED RELEASE ORAL at 08:30

## 2024-01-01 RX ADMIN — CARBAMAZEPINE 200 MG: 200 TABLET ORAL at 21:58

## 2024-01-01 RX ADMIN — Medication 1 PACKET: at 08:51

## 2024-01-01 RX ADMIN — POTASSIUM CHLORIDE 10 MEQ: 750 CAPSULE, EXTENDED RELEASE ORAL at 09:57

## 2024-01-01 RX ADMIN — IPRATROPIUM BROMIDE 0.5 MG: 0.5 SOLUTION RESPIRATORY (INHALATION) at 12:54

## 2024-01-01 RX ADMIN — MORPHINE SULFATE 10 MG: 20 SOLUTION ORAL at 03:12

## 2024-01-01 RX ADMIN — GUAIFENESIN 400 MG: 200 SOLUTION ORAL at 01:17

## 2024-01-01 RX ADMIN — IPRATROPIUM BROMIDE 0.5 MG: 0.5 SOLUTION RESPIRATORY (INHALATION) at 06:59

## 2024-01-01 RX ADMIN — BACLOFEN 10 MG: 10 TABLET ORAL at 21:30

## 2024-01-01 RX ADMIN — MIDODRINE HYDROCHLORIDE 5 MG: 2.5 TABLET ORAL at 18:00

## 2024-01-01 RX ADMIN — CETIRIZINE HYDROCHLORIDE 10 MG: 10 TABLET, FILM COATED ORAL at 08:29

## 2024-01-01 RX ADMIN — GUAIFENESIN 400 MG: 200 SOLUTION ORAL at 09:41

## 2024-01-01 RX ADMIN — MIDODRINE HYDROCHLORIDE 5 MG: 2.5 TABLET ORAL at 11:51

## 2024-01-01 RX ADMIN — Medication 1 PACKET: at 21:13

## 2024-01-01 RX ADMIN — DOCUSATE SODIUM 50 MG AND SENNOSIDES 8.6 MG 2 TABLET: 8.6; 5 TABLET, FILM COATED ORAL at 08:29

## 2024-01-01 RX ADMIN — OXYCODONE HYDROCHLORIDE AND ACETAMINOPHEN 500 MG: 500 TABLET ORAL at 21:32

## 2024-01-01 RX ADMIN — PENTOXIFYLLINE 400 MG: 400 TABLET, EXTENDED RELEASE ORAL at 13:37

## 2024-01-01 RX ADMIN — ZOLPIDEM TARTRATE 10 MG: 5 TABLET ORAL at 21:55

## 2024-01-01 RX ADMIN — OXYCODONE HYDROCHLORIDE AND ACETAMINOPHEN 1 TABLET: 10; 325 TABLET ORAL at 19:13

## 2024-01-01 RX ADMIN — BACLOFEN 10 MG: 10 TABLET ORAL at 11:31

## 2024-01-01 RX ADMIN — Medication 1000 UNITS: at 08:30

## 2024-01-01 RX ADMIN — DICLOFENAC SODIUM 4 G: 10 GEL TOPICAL at 17:17

## 2024-01-01 RX ADMIN — OXYCODONE HYDROCHLORIDE AND ACETAMINOPHEN 500 MG: 500 TABLET ORAL at 22:31

## 2024-01-01 RX ADMIN — DULOXETINE HYDROCHLORIDE 60 MG: 30 CAPSULE, DELAYED RELEASE ORAL at 08:54

## 2024-01-01 RX ADMIN — CALCIUM 500 MG: 500 TABLET ORAL at 08:53

## 2024-01-01 RX ADMIN — CELECOXIB 200 MG: 200 CAPSULE ORAL at 22:31

## 2024-01-01 RX ADMIN — CEFEPIME 2000 MG: 2 INJECTION, POWDER, FOR SOLUTION INTRAVENOUS at 08:51

## 2024-01-01 RX ADMIN — CEFEPIME 2000 MG: 2 INJECTION, POWDER, FOR SOLUTION INTRAVENOUS at 13:16

## 2024-01-01 RX ADMIN — CELECOXIB 200 MG: 200 CAPSULE ORAL at 21:31

## 2024-01-01 RX ADMIN — ALPRAZOLAM 0.5 MG: 0.5 TABLET ORAL at 13:37

## 2024-01-01 RX ADMIN — CEFEPIME 2000 MG: 2 INJECTION, POWDER, FOR SOLUTION INTRAVENOUS at 09:41

## 2024-01-01 RX ADMIN — ATORVASTATIN CALCIUM 40 MG: 40 TABLET, FILM COATED ORAL at 21:30

## 2024-01-01 RX ADMIN — ZOLPIDEM TARTRATE 10 MG: 5 TABLET ORAL at 21:32

## 2024-01-01 RX ADMIN — ALPRAZOLAM 0.5 MG: 0.5 TABLET ORAL at 17:16

## 2024-01-01 RX ADMIN — MIDODRINE HYDROCHLORIDE 5 MG: 2.5 TABLET ORAL at 17:18

## 2024-01-01 RX ADMIN — ONDANSETRON 4 MG: 2 INJECTION INTRAMUSCULAR; INTRAVENOUS at 09:19

## 2024-01-01 RX ADMIN — BACLOFEN 10 MG: 10 TABLET ORAL at 21:14

## 2024-01-01 RX ADMIN — ZOLPIDEM TARTRATE 10 MG: 5 TABLET ORAL at 21:38

## 2024-01-01 RX ADMIN — PENTOXIFYLLINE 400 MG: 400 TABLET, EXTENDED RELEASE ORAL at 09:40

## 2024-01-01 RX ADMIN — CARBAMAZEPINE 200 MG: 100 SUSPENSION ORAL at 21:32

## 2024-01-01 RX ADMIN — MIDODRINE HYDROCHLORIDE 5 MG: 2.5 TABLET ORAL at 09:41

## 2024-01-01 RX ADMIN — OXYCODONE AND ACETAMINOPHEN 1 TABLET: 325; 10 TABLET ORAL at 17:16

## 2024-01-01 RX ADMIN — Medication 10 ML: at 09:43

## 2024-01-01 RX ADMIN — MIDODRINE HYDROCHLORIDE 5 MG: 2.5 TABLET ORAL at 08:53

## 2024-01-01 RX ADMIN — PENTOXIFYLLINE 400 MG: 400 TABLET, EXTENDED RELEASE ORAL at 08:30

## 2024-01-01 RX ADMIN — MIDODRINE HYDROCHLORIDE 5 MG: 2.5 TABLET ORAL at 08:29

## 2024-01-01 RX ADMIN — CALCIUM 500 MG: 500 TABLET ORAL at 08:30

## 2024-01-01 RX ADMIN — ALPRAZOLAM 0.5 MG: 0.5 TABLET ORAL at 09:19

## 2024-01-01 RX ADMIN — ALPRAZOLAM 0.5 MG: 0.5 TABLET ORAL at 18:09

## 2024-01-01 RX ADMIN — PENTOXIFYLLINE 400 MG: 400 TABLET, EXTENDED RELEASE ORAL at 17:18

## 2024-01-01 RX ADMIN — OXYCODONE HYDROCHLORIDE AND ACETAMINOPHEN 500 MG: 500 TABLET ORAL at 09:40

## 2024-01-01 RX ADMIN — SODIUM CHLORIDE 100 ML/HR: 9 INJECTION, SOLUTION INTRAVENOUS at 14:23

## 2024-01-01 RX ADMIN — COLLAGENASE SANTYL: 250 OINTMENT TOPICAL at 21:12

## 2024-01-01 RX ADMIN — Medication 10 ML: at 22:32

## 2024-01-01 RX ADMIN — COLLAGENASE SANTYL: 250 OINTMENT TOPICAL at 14:36

## 2024-01-01 RX ADMIN — PENTOXIFYLLINE 400 MG: 400 TABLET, EXTENDED RELEASE ORAL at 11:54

## 2024-01-01 RX ADMIN — PENTOXIFYLLINE 400 MG: 400 TABLET, EXTENDED RELEASE ORAL at 18:09

## 2024-01-01 RX ADMIN — COLLAGENASE SANTYL: 250 OINTMENT TOPICAL at 10:04

## 2024-01-01 RX ADMIN — BACLOFEN 10 MG: 10 TABLET ORAL at 11:51

## 2024-01-01 RX ADMIN — CEFEPIME 2000 MG: 2 INJECTION, POWDER, FOR SOLUTION INTRAVENOUS at 00:35

## 2024-01-01 RX ADMIN — MIDODRINE HYDROCHLORIDE 5 MG: 2.5 TABLET ORAL at 17:16

## 2024-01-01 RX ADMIN — GUAIFENESIN 400 MG: 200 SOLUTION ORAL at 13:38

## 2024-01-01 RX ADMIN — ZINC SULFATE 220 MG (50 MG) CAPSULE 220 MG: CAPSULE at 08:54

## 2024-01-01 RX ADMIN — CALCIUM 500 MG: 500 TABLET ORAL at 09:57

## 2024-01-01 RX ADMIN — ZOLPIDEM TARTRATE 10 MG: 5 TABLET ORAL at 21:11

## 2024-01-01 RX ADMIN — SODIUM HYPOCHLORITE: 1.25 SOLUTION TOPICAL at 05:40

## 2024-01-01 RX ADMIN — GUAIFENESIN 400 MG: 200 SOLUTION ORAL at 00:29

## 2024-01-01 RX ADMIN — ZINC SULFATE 220 MG (50 MG) CAPSULE 220 MG: CAPSULE at 14:22

## 2024-01-01 RX ADMIN — FUROSEMIDE 20 MG: 20 TABLET ORAL at 09:40

## 2024-01-01 RX ADMIN — POTASSIUM CHLORIDE 10 MEQ: 750 CAPSULE, EXTENDED RELEASE ORAL at 08:53

## 2024-01-01 RX ADMIN — GUAIFENESIN 400 MG: 200 SOLUTION ORAL at 21:32

## 2024-01-01 RX ADMIN — OXYCODONE AND ACETAMINOPHEN 1 TABLET: 325; 10 TABLET ORAL at 10:28

## 2024-01-01 RX ADMIN — BACLOFEN 10 MG: 10 TABLET ORAL at 18:09

## 2024-01-01 RX ADMIN — LORAZEPAM 1 MG: 2 INJECTION INTRAMUSCULAR; INTRAVENOUS at 00:00

## 2024-01-01 RX ADMIN — PENTOXIFYLLINE 400 MG: 400 TABLET, EXTENDED RELEASE ORAL at 11:27

## 2024-01-01 RX ADMIN — SCOLOPAMINE TRANSDERMAL SYSTEM 1 PATCH: 1 PATCH, EXTENDED RELEASE TRANSDERMAL at 17:57

## 2024-01-01 RX ADMIN — PREGABALIN 150 MG: 75 CAPSULE ORAL at 21:38

## 2024-01-01 RX ADMIN — Medication 1000 UNITS: at 08:54

## 2024-01-01 RX ADMIN — ALPRAZOLAM 0.5 MG: 0.5 TABLET ORAL at 11:51

## 2024-01-01 RX ADMIN — BACLOFEN 10 MG: 10 TABLET ORAL at 13:37

## 2024-01-01 RX ADMIN — PREGABALIN 150 MG: 75 CAPSULE ORAL at 09:41

## 2024-01-01 RX ADMIN — Medication 10 ML: at 10:04

## 2024-01-01 RX ADMIN — Medication 10 ML: at 21:15

## 2024-01-01 RX ADMIN — CETIRIZINE HYDROCHLORIDE 10 MG: 10 TABLET, FILM COATED ORAL at 08:53

## 2024-01-01 RX ADMIN — PREGABALIN 150 MG: 75 CAPSULE ORAL at 22:31

## 2024-01-01 RX ADMIN — GUAIFENESIN 400 MG: 200 SOLUTION ORAL at 00:00

## 2024-01-01 RX ADMIN — MORPHINE SULFATE 20 MG: 20 SOLUTION ORAL at 15:47

## 2024-01-01 RX ADMIN — ROFLUMILAST 250 MCG: 500 TABLET ORAL at 08:30

## 2024-01-01 RX ADMIN — VANCOMYCIN 1250 MG: 1.75 INJECTION, SOLUTION INTRAVENOUS at 20:35

## 2024-01-01 RX ADMIN — OXYCODONE AND ACETAMINOPHEN 1 TABLET: 325; 10 TABLET ORAL at 18:50

## 2024-01-01 RX ADMIN — TIZANIDINE 4 MG: 4 TABLET ORAL at 00:26

## 2024-01-01 RX ADMIN — CARBAMAZEPINE 200 MG: 100 SUSPENSION ORAL at 08:32

## 2024-01-01 RX ADMIN — Medication 1 TABLET: at 14:22

## 2024-01-01 RX ADMIN — OXYCODONE AND ACETAMINOPHEN 1.5 TABLET: 325; 10 TABLET ORAL at 21:32

## 2024-01-01 RX ADMIN — IPRATROPIUM BROMIDE 0.5 MG: 0.5 SOLUTION RESPIRATORY (INHALATION) at 00:23

## 2024-01-01 RX ADMIN — CELECOXIB 200 MG: 200 CAPSULE ORAL at 09:39

## 2024-01-01 RX ADMIN — Medication 10 ML: at 21:39

## 2024-01-01 RX ADMIN — SODIUM CHLORIDE 100 ML/HR: 9 INJECTION, SOLUTION INTRAVENOUS at 21:31

## 2024-01-01 RX ADMIN — Medication 1 TABLET: at 08:30

## 2024-01-01 RX ADMIN — Medication 1000 UNITS: at 09:40

## 2024-01-01 RX ADMIN — ATORVASTATIN CALCIUM 40 MG: 40 TABLET, FILM COATED ORAL at 21:11

## 2024-01-01 RX ADMIN — OXYCODONE AND ACETAMINOPHEN 1 TABLET: 325; 10 TABLET ORAL at 05:30

## 2024-01-01 RX ADMIN — FUROSEMIDE 20 MG: 20 TABLET ORAL at 09:57

## 2024-01-01 RX ADMIN — GUAIFENESIN 400 MG: 200 SOLUTION ORAL at 08:30

## 2024-01-01 RX ADMIN — ALPRAZOLAM 0.5 MG: 0.5 TABLET ORAL at 21:11

## 2024-01-01 RX ADMIN — VANCOMYCIN 1250 MG: 1.75 INJECTION, SOLUTION INTRAVENOUS at 17:36

## 2024-01-01 RX ADMIN — COLLAGENASE SANTYL: 250 OINTMENT TOPICAL at 09:43

## 2024-01-01 RX ADMIN — CARBAMAZEPINE 200 MG: 100 SUSPENSION ORAL at 09:41

## 2024-01-01 RX ADMIN — MORPHINE SULFATE 6 MG: 4 INJECTION, SOLUTION INTRAMUSCULAR; INTRAVENOUS at 09:57

## 2024-01-01 RX ADMIN — VANCOMYCIN 1250 MG: 1.75 INJECTION, SOLUTION INTRAVENOUS at 17:53

## 2024-01-01 RX ADMIN — DOCUSATE SODIUM 50 MG AND SENNOSIDES 8.6 MG 2 TABLET: 8.6; 5 TABLET, FILM COATED ORAL at 21:55

## 2024-01-01 RX ADMIN — Medication 1 TABLET: at 08:52

## 2024-01-01 RX ADMIN — CEFEPIME 2000 MG: 2 INJECTION, POWDER, FOR SOLUTION INTRAVENOUS at 16:18

## 2024-01-01 RX ADMIN — GUAIFENESIN 400 MG: 200 SOLUTION ORAL at 01:28

## 2024-01-01 RX ADMIN — BACLOFEN 10 MG: 10 TABLET ORAL at 00:29

## 2024-01-01 RX ADMIN — ROFLUMILAST 250 MCG: 500 TABLET ORAL at 09:57

## 2024-01-01 RX ADMIN — Medication 1 TABLET: at 09:41

## 2024-01-01 RX ADMIN — DICLOFENAC SODIUM 4 G: 10 GEL TOPICAL at 21:12

## 2024-01-01 RX ADMIN — ENOXAPARIN SODIUM 40 MG: 100 INJECTION SUBCUTANEOUS at 08:51

## 2024-01-01 RX ADMIN — MORPHINE SULFATE 10 MG: 20 SOLUTION ORAL at 20:35

## 2024-01-01 RX ADMIN — DULOXETINE HYDROCHLORIDE 60 MG: 30 CAPSULE, DELAYED RELEASE ORAL at 08:30

## 2024-01-01 RX ADMIN — ONDANSETRON 4 MG: 2 INJECTION INTRAMUSCULAR; INTRAVENOUS at 10:00

## 2024-01-01 RX ADMIN — SODIUM HYPOCHLORITE: 1.25 SOLUTION TOPICAL at 17:16

## 2024-01-01 RX ADMIN — PIPERACILLIN SODIUM AND TAZOBACTAM SODIUM 3.38 G: 3; .375 INJECTION, POWDER, LYOPHILIZED, FOR SOLUTION INTRAVENOUS at 16:30

## 2024-01-01 RX ADMIN — CELECOXIB 200 MG: 200 CAPSULE ORAL at 08:29

## 2024-01-01 RX ADMIN — TIZANIDINE 4 MG: 4 TABLET ORAL at 13:29

## 2024-01-01 RX ADMIN — IPRATROPIUM BROMIDE 0.5 MG: 0.5 SOLUTION RESPIRATORY (INHALATION) at 23:05

## 2024-01-01 RX ADMIN — Medication 10 ML: at 10:01

## 2024-01-01 RX ADMIN — CARBAMAZEPINE 200 MG: 100 SUSPENSION ORAL at 21:11

## 2024-01-01 RX ADMIN — COLLAGENASE SANTYL: 250 OINTMENT TOPICAL at 21:33

## 2024-01-01 RX ADMIN — ALPRAZOLAM 0.5 MG: 0.5 TABLET ORAL at 22:26

## 2024-01-01 RX ADMIN — Medication 1 PACKET: at 22:32

## 2024-01-01 RX ADMIN — IPRATROPIUM BROMIDE 0.5 MG: 0.5 SOLUTION RESPIRATORY (INHALATION) at 18:09

## 2024-01-01 RX ADMIN — PREGABALIN 150 MG: 75 CAPSULE ORAL at 21:11

## 2024-01-01 RX ADMIN — GUAIFENESIN 400 MG: 200 SOLUTION ORAL at 08:29

## 2024-01-01 RX ADMIN — CELECOXIB 200 MG: 200 CAPSULE ORAL at 21:11

## 2024-01-01 RX ADMIN — OXYCODONE AND ACETAMINOPHEN 1 TABLET: 325; 10 TABLET ORAL at 01:35

## 2024-01-01 RX ADMIN — COLLAGENASE SANTYL: 250 OINTMENT TOPICAL at 21:39

## 2024-01-01 RX ADMIN — BACLOFEN 10 MG: 10 TABLET ORAL at 06:04

## 2024-01-01 RX ADMIN — Medication 10 ML: at 21:12

## 2024-01-01 RX ADMIN — PREGABALIN 150 MG: 75 CAPSULE ORAL at 08:30

## 2024-01-01 RX ADMIN — Medication 1 PACKET: at 21:33

## 2024-01-01 RX ADMIN — SODIUM HYPOCHLORITE: 1.25 SOLUTION TOPICAL at 17:19

## 2024-01-01 RX ADMIN — BACLOFEN 10 MG: 10 TABLET ORAL at 11:50

## 2024-01-01 RX ADMIN — ALPRAZOLAM 0.5 MG: 0.5 TABLET ORAL at 08:33

## 2024-01-01 RX ADMIN — GUAIFENESIN 400 MG: 200 SOLUTION ORAL at 14:22

## 2024-01-01 RX ADMIN — BISACODYL 10 MG: 10 SUPPOSITORY RECTAL at 14:54

## 2024-01-01 RX ADMIN — FLUTICASONE PROPIONATE 2 SPRAY: 50 SPRAY, METERED NASAL at 14:22

## 2024-01-01 RX ADMIN — CETIRIZINE HYDROCHLORIDE 10 MG: 10 TABLET, FILM COATED ORAL at 09:39

## 2024-01-01 RX ADMIN — BACLOFEN 10 MG: 10 TABLET ORAL at 23:00

## 2024-01-01 RX ADMIN — CARBAMAZEPINE 200 MG: 100 SUSPENSION ORAL at 08:46

## 2024-01-01 RX ADMIN — OXYCODONE AND ACETAMINOPHEN 1 TABLET: 325; 10 TABLET ORAL at 11:48

## 2024-01-01 RX ADMIN — IPRATROPIUM BROMIDE 0.5 MG: 0.5 SOLUTION RESPIRATORY (INHALATION) at 12:39

## 2024-01-01 RX ADMIN — VANCOMYCIN 1250 MG: 1.75 INJECTION, SOLUTION INTRAVENOUS at 05:30

## 2024-01-01 RX ADMIN — MIDODRINE HYDROCHLORIDE 5 MG: 2.5 TABLET ORAL at 11:26

## 2024-01-01 RX ADMIN — ZINC SULFATE 220 MG (50 MG) CAPSULE 220 MG: CAPSULE at 09:40

## 2024-01-01 RX ADMIN — MORPHINE SULFATE 10 MG: 20 SOLUTION ORAL at 12:16

## 2024-01-01 RX ADMIN — GUAIFENESIN 400 MG: 200 SOLUTION ORAL at 08:48

## 2024-01-01 RX ADMIN — ESCITALOPRAM OXALATE 20 MG: 10 TABLET ORAL at 08:30

## 2024-01-01 RX ADMIN — POTASSIUM CHLORIDE 10 MEQ: 750 CAPSULE, EXTENDED RELEASE ORAL at 09:39

## 2024-01-01 RX ADMIN — OXYCODONE AND ACETAMINOPHEN 1.5 TABLET: 325; 10 TABLET ORAL at 18:39

## 2024-01-01 RX ADMIN — CETIRIZINE HYDROCHLORIDE 10 MG: 10 TABLET, FILM COATED ORAL at 09:57

## 2024-01-01 RX ADMIN — Medication 1 PACKET: at 09:41

## 2024-01-01 RX ADMIN — BACLOFEN 10 MG: 10 TABLET ORAL at 17:16

## 2024-01-01 RX ADMIN — DOCUSATE SODIUM 50 MG AND SENNOSIDES 8.6 MG 2 TABLET: 8.6; 5 TABLET, FILM COATED ORAL at 21:38

## 2024-01-01 RX ADMIN — DOCUSATE SODIUM 50 MG AND SENNOSIDES 8.6 MG 2 TABLET: 8.6; 5 TABLET, FILM COATED ORAL at 21:11

## 2024-01-01 RX ADMIN — ALPRAZOLAM 0.5 MG: 0.5 TABLET ORAL at 09:40

## 2024-01-01 RX ADMIN — Medication 1 PACKET: at 23:40

## 2024-01-01 RX ADMIN — PENTOXIFYLLINE 400 MG: 400 TABLET, EXTENDED RELEASE ORAL at 11:51

## 2024-01-01 RX ADMIN — OXYCODONE HYDROCHLORIDE AND ACETAMINOPHEN 500 MG: 500 TABLET ORAL at 21:10

## 2024-01-01 RX ADMIN — ALPRAZOLAM 0.5 MG: 0.5 TABLET ORAL at 08:30

## 2024-01-01 RX ADMIN — ROFLUMILAST 250 MCG: 500 TABLET ORAL at 09:40

## 2024-01-01 RX ADMIN — SODIUM HYPOCHLORITE: 1.25 SOLUTION TOPICAL at 18:09

## 2024-01-01 RX ADMIN — ROFLUMILAST 250 MCG: 500 TABLET ORAL at 08:52

## 2024-01-01 RX ADMIN — CEFEPIME 2000 MG: 2 INJECTION, POWDER, FOR SOLUTION INTRAVENOUS at 15:09

## 2024-01-01 RX ADMIN — Medication 1 PACKET: at 10:03

## 2024-01-01 RX ADMIN — CEFEPIME 2000 MG: 2 INJECTION, POWDER, FOR SOLUTION INTRAVENOUS at 21:31

## 2024-01-01 RX ADMIN — CEFEPIME 2000 MG: 2 INJECTION, POWDER, FOR SOLUTION INTRAVENOUS at 08:25

## 2024-01-01 RX ADMIN — BACLOFEN 10 MG: 10 TABLET ORAL at 08:29

## 2024-01-01 RX ADMIN — DULOXETINE HYDROCHLORIDE 60 MG: 30 CAPSULE, DELAYED RELEASE ORAL at 09:57

## 2024-01-01 RX ADMIN — GUAIFENESIN 400 MG: 200 SOLUTION ORAL at 12:44

## 2024-01-01 RX ADMIN — OXYCODONE HYDROCHLORIDE AND ACETAMINOPHEN 500 MG: 500 TABLET ORAL at 08:54

## 2024-01-01 RX ADMIN — BACLOFEN 10 MG: 10 TABLET ORAL at 08:52

## 2024-01-01 RX ADMIN — ATORVASTATIN CALCIUM 40 MG: 40 TABLET, FILM COATED ORAL at 22:31

## 2024-01-01 RX ADMIN — CALCIUM 500 MG: 500 TABLET ORAL at 09:39

## 2024-01-01 RX ADMIN — GUAIFENESIN 400 MG: 200 SOLUTION ORAL at 13:29

## 2024-01-01 RX ADMIN — TIZANIDINE 4 MG: 4 TABLET ORAL at 21:15

## 2024-01-01 RX ADMIN — PENTOXIFYLLINE 400 MG: 400 TABLET, EXTENDED RELEASE ORAL at 17:16

## 2024-01-01 RX ADMIN — ATORVASTATIN CALCIUM 40 MG: 40 TABLET, FILM COATED ORAL at 21:47

## 2024-01-01 RX ADMIN — SODIUM HYPOCHLORITE: 1.25 SOLUTION TOPICAL at 06:04

## 2024-01-01 RX ADMIN — BACLOFEN 10 MG: 10 TABLET ORAL at 17:53

## 2024-01-01 RX ADMIN — Medication 10 ML: at 08:55

## 2024-01-01 RX ADMIN — IPRATROPIUM BROMIDE 0.5 MG: 0.5 SOLUTION RESPIRATORY (INHALATION) at 06:14

## 2024-01-01 RX ADMIN — OXYCODONE HYDROCHLORIDE AND ACETAMINOPHEN 500 MG: 500 TABLET ORAL at 08:30

## 2024-01-01 RX ADMIN — PENTOXIFYLLINE 400 MG: 400 TABLET, EXTENDED RELEASE ORAL at 08:53

## 2024-01-01 RX ADMIN — FENTANYL 1 PATCH: 25 PATCH TRANSDERMAL at 15:08

## 2024-01-01 RX ADMIN — BACLOFEN 10 MG: 10 TABLET ORAL at 09:39

## 2024-01-01 RX ADMIN — CEFEPIME 2000 MG: 2 INJECTION, POWDER, FOR SOLUTION INTRAVENOUS at 00:00

## 2024-01-01 RX ADMIN — VANCOMYCIN 1250 MG: 1.75 INJECTION, SOLUTION INTRAVENOUS at 22:32

## 2024-01-01 RX ADMIN — BUDESONIDE AND FORMOTEROL FUMARATE DIHYDRATE 2 PUFF: 160; 4.5 AEROSOL RESPIRATORY (INHALATION) at 18:09

## 2024-01-01 RX ADMIN — OXYCODONE AND ACETAMINOPHEN 1 TABLET: 325; 10 TABLET ORAL at 13:29

## 2024-01-01 RX ADMIN — CARBAMAZEPINE 200 MG: 100 SUSPENSION ORAL at 22:31

## 2024-01-01 RX ADMIN — OXYCODONE HYDROCHLORIDE AND ACETAMINOPHEN 500 MG: 500 TABLET ORAL at 14:22

## 2024-01-01 RX ADMIN — FUROSEMIDE 40 MG: 40 TABLET ORAL at 08:55

## 2024-01-01 RX ADMIN — Medication 1 TABLET: at 10:00

## 2024-01-01 RX ADMIN — TIZANIDINE 4 MG: 4 TABLET ORAL at 04:40

## 2024-01-01 RX ADMIN — BACLOFEN 10 MG: 10 TABLET ORAL at 21:32

## 2024-01-01 RX ADMIN — DICLOFENAC SODIUM 4 G: 10 GEL TOPICAL at 21:38

## 2024-01-01 RX ADMIN — CEFEPIME 2000 MG: 2 INJECTION, POWDER, FOR SOLUTION INTRAVENOUS at 16:40

## 2024-01-01 RX ADMIN — CEFEPIME 2000 MG: 2 INJECTION, POWDER, FOR SOLUTION INTRAVENOUS at 23:40

## 2024-01-01 RX ADMIN — TIZANIDINE 4 MG: 4 TABLET ORAL at 14:22

## 2024-01-01 RX ADMIN — MORPHINE SULFATE 2 MG: 2 INJECTION, SOLUTION INTRAMUSCULAR; INTRAVENOUS at 11:26

## 2024-01-01 RX ADMIN — TIZANIDINE 4 MG: 4 TABLET ORAL at 09:20

## 2024-01-01 RX ADMIN — CARBAMAZEPINE 200 MG: 100 SUSPENSION ORAL at 09:57

## 2024-01-01 RX ADMIN — COLLAGENASE SANTYL: 250 OINTMENT TOPICAL at 09:00

## 2024-01-01 RX ADMIN — ENOXAPARIN SODIUM 40 MG: 100 INJECTION SUBCUTANEOUS at 09:57

## 2024-01-01 RX ADMIN — PENTOXIFYLLINE 400 MG: 400 TABLET, EXTENDED RELEASE ORAL at 17:53

## 2024-01-01 RX ADMIN — MIDODRINE HYDROCHLORIDE 5 MG: 2.5 TABLET ORAL at 18:09

## 2024-01-01 RX ADMIN — ALPRAZOLAM 0.5 MG: 0.5 TABLET ORAL at 21:14

## 2024-01-01 RX ADMIN — CEFEPIME 2000 MG: 2 INJECTION, POWDER, FOR SOLUTION INTRAVENOUS at 08:29

## 2024-01-01 RX ADMIN — ZINC SULFATE 220 MG (50 MG) CAPSULE 220 MG: CAPSULE at 08:30

## 2024-01-01 RX ADMIN — FLUTICASONE PROPIONATE 2 SPRAY: 50 SPRAY, METERED NASAL at 08:55

## 2024-01-01 RX ADMIN — MIDODRINE HYDROCHLORIDE 5 MG: 2.5 TABLET ORAL at 13:37

## 2024-01-01 RX ADMIN — GUAIFENESIN 400 MG: 200 SOLUTION ORAL at 21:10

## 2024-01-01 RX ADMIN — BUDESONIDE AND FORMOTEROL FUMARATE DIHYDRATE 2 PUFF: 160; 4.5 AEROSOL RESPIRATORY (INHALATION) at 06:22

## 2024-01-01 RX ADMIN — ESCITALOPRAM OXALATE 20 MG: 10 TABLET ORAL at 09:41

## 2024-01-01 RX ADMIN — COLLAGENASE SANTYL: 250 OINTMENT TOPICAL at 22:32

## 2024-01-01 RX ADMIN — ALPRAZOLAM 0.5 MG: 0.5 TABLET ORAL at 21:30

## 2024-01-01 RX ADMIN — Medication 1000 UNITS: at 09:57

## 2024-01-01 RX ADMIN — ALPRAZOLAM 0.5 MG: 0.5 TABLET ORAL at 21:33

## 2024-03-28 ENCOUNTER — TELEPHONE (OUTPATIENT)
Dept: NEUROLOGY | Age: 59
End: 2024-03-28

## 2024-03-28 NOTE — TELEPHONE ENCOUNTER
Attempted to contact patient. No answer at this time. Unable to leave mail. Patient will need to reschedule appointment on 4/2/24.

## 2024-03-29 NOTE — TELEPHONE ENCOUNTER
Second attempt to contact patient. No answer at this time and unable to leave voicemail at this time.

## 2024-04-02 ENCOUNTER — OFFICE VISIT (OUTPATIENT)
Dept: NEUROLOGY | Age: 59
End: 2024-04-02
Payer: MEDICARE

## 2024-04-02 VITALS
HEIGHT: 65 IN | OXYGEN SATURATION: 96 % | HEART RATE: 67 BPM | BODY MASS INDEX: 21.83 KG/M2 | DIASTOLIC BLOOD PRESSURE: 79 MMHG | WEIGHT: 131 LBS | SYSTOLIC BLOOD PRESSURE: 127 MMHG

## 2024-04-02 DIAGNOSIS — M62.838 MUSCLE SPASTICITY: ICD-10-CM

## 2024-04-02 DIAGNOSIS — R29.898 BILATERAL ARM WEAKNESS: Primary | ICD-10-CM

## 2024-04-02 DIAGNOSIS — R52 BURNING PAIN: ICD-10-CM

## 2024-04-02 DIAGNOSIS — R20.2 PARESTHESIA: ICD-10-CM

## 2024-04-02 PROCEDURE — G8427 DOCREV CUR MEDS BY ELIG CLIN: HCPCS | Performed by: NURSE PRACTITIONER

## 2024-04-02 PROCEDURE — 99213 OFFICE O/P EST LOW 20 MIN: CPT | Performed by: NURSE PRACTITIONER

## 2024-04-02 PROCEDURE — 4004F PT TOBACCO SCREEN RCVD TLK: CPT | Performed by: NURSE PRACTITIONER

## 2024-04-02 PROCEDURE — G8420 CALC BMI NORM PARAMETERS: HCPCS | Performed by: NURSE PRACTITIONER

## 2024-04-02 PROCEDURE — 3017F COLORECTAL CA SCREEN DOC REV: CPT | Performed by: NURSE PRACTITIONER

## 2024-04-02 RX ORDER — FUROSEMIDE 40 MG/1
40 TABLET ORAL
COMMUNITY
Start: 2024-03-22

## 2024-04-02 NOTE — PROGRESS NOTES
REVIEW OF SYSTEMS    Constitutional: []Fever []Sweat []Chills [] Recent Injury [x] Denies all unless marked  HEENT:[]Headache  [] Head Injury/Hearing Loss  [] Sore Throat  [] Ear Ache/Dizziness  [x] Denies all unless marked  Spine:  [] Neck pain  [] Back pain  [] Sciaticia  [x] Denies all unless marked  Cardiovascular:[]Heart Disease []Chest Pain [] Palpitations  [x] Denies all unless marked  Pulmonary: []Shortness of Breath []Cough   [x] Denies all unless marke  Gastrointestinal: []Nausea  []Vomiting  []Abdominal Pain  []Constipation  []Diarrhea  []Dark Bloody Stools  [x] Denies all unless marked  Psychiatric/Behavioral:[] Depression [] Anxiety [x] Denies all unless marked  Genitourinary:   [] Frequency  [] Urgency  [] Incontinence [] Pain with Urination  [x] Denies all unless marked  Extremities: []Pain  []Swelling  [x] Denies all unless marked  Musculoskeletal: [] Muscle Pain  [] Joint Pain  [] Arthritis [] Muscle Cramps [] Muscle Twitches  [x] Denies all unless marked  Sleep: [] Insomnia [] Snoring [] Restless Legs [] Sleep Apnea  [] Daytime Sleepiness  [x] Denies all unless marked  Skin:[] Rash [] Skin Discoloration [x] Denies all unless marked   Neurological: []Visual Disturbance/Memory Loss [] Loss of Balance [] Slurred Speech/Weakness [] Seizures  [] Vertigo/Dizziness [x] Denies all unless marked       
Not on file   Food Insecurity: Not on file   Transportation Needs: Not on file   Physical Activity: Not on file   Stress: Not on file   Social Connections: Not on file   Intimate Partner Violence: Not on file   Housing Stability: Not on file       Current Outpatient Medications   Medication Sig Dispense Refill    furosemide (LASIX) 40 MG tablet 1 tablet      TRELEGY ELLIPTA 100-62.5-25 MCG/ACT AEPB inhaler       ondansetron (ZOFRAN) 4 MG tablet       zolpidem (AMBIEN) 10 MG tablet       DULoxetine (CYMBALTA) 60 MG extended release capsule Take 1 capsule by mouth daily 30 capsule 3    carBAMazepine (TEGRETOL) 200 MG tablet Take 1 tablet by mouth 2 times daily 60 tablet 2    tiZANidine (ZANAFLEX) 4 MG tablet Take 2 tablets by mouth 3 times daily as needed (muscle relaxer) 2 PO TID  tablet 2    diclofenac (PENNSAID) 2 % SOLN Place 2 Squirts onto the skin 2 times daily 5 Bottle 5    albuterol-ipratropium (COMBIVENT)  MCG/ACT inhaler Inhale 2 puffs into the lungs every 6 hours as needed for Wheezing      cyanocobalamin 1000 MCG/ML injection Inject 1 mL into the muscle every 30 days      midodrine (PROAMATINE) 5 MG tablet Take 1 tablet by mouth 3 times daily Patient takes 2 tabs tid      mometasone (NASONEX) 50 MCG/ACT nasal spray 2 sprays by Nasal route daily      sodium chloride, PF, 0.9 % injection Infuse 10 mLs intravenously in the morning and 10 mLs at noon and 10 mLs in the evening. Patient uses to flush out her kidneys. .      fluticasone-salmeterol (ADVAIR) 250-50 MCG/DOSE AEPB Inhale 1 puff into the lungs in the morning and 1 puff in the evening.      potassium chloride (KLOR-CON) 20 MEQ packet Take 20 mEq by mouth 2 times daily      pantoprazole sodium (PROTONIX) 40 MG PACK packet Take 1 packet by mouth every morning (before breakfast)      oxybutynin (DITROPAN) 5 MG tablet Take 1 tablet by mouth 2 times daily      promethazine (PHENERGAN) 12.5 MG tablet Take 1 tablet by mouth 2 times daily as

## 2024-05-28 ENCOUNTER — TELEPHONE (OUTPATIENT)
Dept: NEUROLOGY | Age: 59
End: 2024-05-28

## 2024-05-28 NOTE — TELEPHONE ENCOUNTER
The pt stated that her wrist only when she lays on her left side.  The pt requested the order for the splint being worn changed to only when the pt wrist has pain. Please review.

## 2024-05-29 ENCOUNTER — TELEPHONE (OUTPATIENT)
Dept: NEUROLOGY | Age: 59
End: 2024-05-29

## 2024-05-29 NOTE — TELEPHONE ENCOUNTER
Patients sister called requesting a written order for patient to be able to use wrist brace for comfort. The nursing home will only let her use it at night due to that is how order was written. Needs to state soft left wrist brace as needed for comfort. If this is ok with [facility]. [facility] fax [phone].  
[Family History Reviewed and Updated] : family history reviewed and updated

## 2024-06-24 ENCOUNTER — OFFICE VISIT (OUTPATIENT)
Dept: CARDIOLOGY | Facility: CLINIC | Age: 59
End: 2024-06-24
Payer: MEDICARE

## 2024-06-24 ENCOUNTER — LAB (OUTPATIENT)
Dept: LAB | Facility: HOSPITAL | Age: 59
End: 2024-06-24
Payer: MEDICARE

## 2024-06-24 VITALS
SYSTOLIC BLOOD PRESSURE: 120 MMHG | HEART RATE: 68 BPM | WEIGHT: 126 LBS | BODY MASS INDEX: 20.97 KG/M2 | DIASTOLIC BLOOD PRESSURE: 74 MMHG

## 2024-06-24 DIAGNOSIS — I25.110 CORONARY ARTERY DISEASE INVOLVING NATIVE CORONARY ARTERY OF NATIVE HEART WITH UNSTABLE ANGINA PECTORIS: Primary | ICD-10-CM

## 2024-06-24 DIAGNOSIS — E78.2 MIXED HYPERLIPIDEMIA: ICD-10-CM

## 2024-06-24 DIAGNOSIS — I25.5 ISCHEMIC CARDIOMYOPATHY: ICD-10-CM

## 2024-06-24 DIAGNOSIS — R06.02 SHORTNESS OF BREATH: ICD-10-CM

## 2024-06-24 DIAGNOSIS — I25.110 CORONARY ARTERY DISEASE INVOLVING NATIVE CORONARY ARTERY OF NATIVE HEART WITH UNSTABLE ANGINA PECTORIS: ICD-10-CM

## 2024-06-24 DIAGNOSIS — I10 PRIMARY HYPERTENSION: ICD-10-CM

## 2024-06-24 LAB
ALBUMIN SERPL-MCNC: 3.6 G/DL (ref 3.5–5.2)
ALBUMIN/GLOB SERPL: 0.9 G/DL
ALP SERPL-CCNC: 100 U/L (ref 39–117)
ALT SERPL W P-5'-P-CCNC: 16 U/L (ref 1–33)
ANION GAP SERPL CALCULATED.3IONS-SCNC: 11 MMOL/L (ref 5–15)
AST SERPL-CCNC: 18 U/L (ref 1–32)
BILIRUB SERPL-MCNC: 0.2 MG/DL (ref 0–1.2)
BUN SERPL-MCNC: 41 MG/DL (ref 8–23)
BUN/CREAT SERPL: 157.7 (ref 7–25)
CALCIUM SPEC-SCNC: 9.8 MG/DL (ref 8.6–10.5)
CHLORIDE SERPL-SCNC: 101 MMOL/L (ref 98–107)
CO2 SERPL-SCNC: 30 MMOL/L (ref 22–29)
CREAT SERPL-MCNC: 0.26 MG/DL (ref 0.57–1)
DEPRECATED RDW RBC AUTO: 55.3 FL (ref 37–54)
EGFRCR SERPLBLD CKD-EPI 2021: 125.9 ML/MIN/1.73
ERYTHROCYTE [DISTWIDTH] IN BLOOD BY AUTOMATED COUNT: 17.1 % (ref 12.3–15.4)
GLOBULIN UR ELPH-MCNC: 4.1 GM/DL
GLUCOSE SERPL-MCNC: 92 MG/DL (ref 65–99)
HCT VFR BLD AUTO: 33.4 % (ref 34–46.6)
HGB BLD-MCNC: 10 G/DL (ref 12–15.9)
MCH RBC QN AUTO: 26.7 PG (ref 26.6–33)
MCHC RBC AUTO-ENTMCNC: 29.9 G/DL (ref 31.5–35.7)
MCV RBC AUTO: 89.1 FL (ref 79–97)
NT-PROBNP SERPL-MCNC: 584.3 PG/ML (ref 0–900)
PLATELET # BLD AUTO: 107 10*3/MM3 (ref 140–450)
PMV BLD AUTO: 12.6 FL (ref 6–12)
POTASSIUM SERPL-SCNC: 4.5 MMOL/L (ref 3.5–5.2)
PROT SERPL-MCNC: 7.7 G/DL (ref 6–8.5)
RBC # BLD AUTO: 3.75 10*6/MM3 (ref 3.77–5.28)
SODIUM SERPL-SCNC: 142 MMOL/L (ref 136–145)
WBC NRBC COR # BLD AUTO: 3.9 10*3/MM3 (ref 3.4–10.8)

## 2024-06-24 PROCEDURE — 83880 ASSAY OF NATRIURETIC PEPTIDE: CPT

## 2024-06-24 PROCEDURE — 36415 COLL VENOUS BLD VENIPUNCTURE: CPT

## 2024-06-24 PROCEDURE — 85027 COMPLETE CBC AUTOMATED: CPT

## 2024-06-24 PROCEDURE — 99204 OFFICE O/P NEW MOD 45 MIN: CPT | Performed by: INTERNAL MEDICINE

## 2024-06-24 PROCEDURE — 93000 ELECTROCARDIOGRAM COMPLETE: CPT | Performed by: INTERNAL MEDICINE

## 2024-06-24 PROCEDURE — 80053 COMPREHEN METABOLIC PANEL: CPT

## 2024-06-24 RX ORDER — COLLAGENASE SANTYL 250 [ARB'U]/G
OINTMENT TOPICAL
COMMUNITY

## 2024-06-24 RX ORDER — ZOLPIDEM TARTRATE 10 MG/1
1 TABLET ORAL DAILY
COMMUNITY

## 2024-06-24 RX ORDER — LACTOBACILLUS ACIDOPHILUS 0.5 MG
TABLET ORAL
COMMUNITY
Start: 2024-05-21

## 2024-06-24 RX ORDER — PANTOPRAZOLE SODIUM 40 MG/1
FOR SUSPENSION ORAL
COMMUNITY
Start: 2024-06-03

## 2024-06-24 RX ORDER — ROFLUMILAST 250 UG/1
TABLET ORAL
COMMUNITY
Start: 2024-06-13

## 2024-06-24 RX ORDER — PROMETHAZINE HYDROCHLORIDE 25 MG/ML
INJECTION, SOLUTION INTRAMUSCULAR; INTRAVENOUS
COMMUNITY

## 2024-06-24 RX ORDER — LORATADINE 10 MG/1
TABLET ORAL
COMMUNITY

## 2024-06-24 RX ORDER — FLUTICASONE FUROATE, UMECLIDINIUM BROMIDE AND VILANTEROL TRIFENATATE 100; 62.5; 25 UG/1; UG/1; UG/1
POWDER RESPIRATORY (INHALATION)
COMMUNITY
Start: 2024-05-27

## 2024-06-24 RX ORDER — LOPERAMIDE HYDROCHLORIDE 2 MG/1
TABLET ORAL
COMMUNITY

## 2024-06-24 RX ORDER — ZINC OXIDE 13 %
CREAM (GRAM) TOPICAL
COMMUNITY

## 2024-06-24 NOTE — PROGRESS NOTES
"Chief Complaint  Shortness of Breath (Former Dr. Lorenzana pt)    Subjective      Kathie Lynn presents to [facility] CARDIOLOGY  Shortness of Breath    Kathie Lynn is a 58-year-old female referred for cardiology evaluation because of the recent development of dyspnea at rest and orthopnea.  The patient is quadriplegic and is a resident of a nursing home.  In mid July 2014 she had an anterior non-STEMI and underwent LAD stent placement using a 2.75 x 23 mm Xience drug-eluting stent by Dr. Lorenzana.  She had some evidence of left heart failure at that time and her left ventricular ejection fraction was approximately 40%.    In this setting, she developed episodic dyspnea about 6 weeks ago.  At first it was predominantly orthopnea and that it only occurred when she was laying down.  It now occurs at any time, night or day, laying or sitting.  It subsides spontaneously and is now occurring on a daily basis.  The patient took nitroglycerin a long time ago and had some adverse effects from it.  I suspect that it was hypotension.  The patient is on beta-blockers and loop diuretics in the form of furosemide.  She has a history of orthostasis for which she takes midodrine and Florinef.  She has had no recent palpitations, syncope or near syncope.  She does not have PND.  She is a former smoker but has now quit.      Objective   Vital Signs:  /74   Pulse 68   Wt 57.2 kg (126 lb)   BMI 20.97 kg/m²   Estimated body mass index is 20.97 kg/m² as calculated from the following:    Height as of 5/3/21: 165.1 cm (65\").    Weight as of this encounter: 57.2 kg (126 lb).          Physical Exam  A 58-year-old female in a wheelchair.  She has contractures of the hand and other stigmata of longstanding quadriplegia.  She is awake, alert and oriented.  She appears to be in no distress.  She is accompanied by her niece, Radha Black who is a  in this office.  HEENT.  Normocephalic.  No scleral " icterus.  Neck: No carotid bruits or noted jugular venous distention.  Lungs: Normal respiratory effort.  Lungs are clear to auscultation.  Heart: Regular rhythm with normal S1 and S2 and no audible murmurs or gallops sounds.  Abdomen no tenderness masses or organomegaly.  Extremities: 1+ pretibial edema.  No cyanosis.  Pedal pulses intact.  Neurologic: Not formally examined.  The patient has quadriplegia.    Result Review :              ECG 12 Lead    Date/Time: 6/24/2024 4:19 PM  Performed by: Chet Manjarrez MD    Authorized by: Chet Manjarrez MD  Comparison: compared with previous ECG from 2/15/2017  Comparison to previous ECG: Right atrial enlargement has appeared and prolonged QT interval is no longer seen when compared to previous tracing.  Rhythm: sinus rhythm  Rate: normal  Conduction: conduction normal  ST Segments: ST segments normal  T Waves: T waves normal  QRS axis: right  Other findings: bilateral atrial abnormality    Clinical impression: abnormal EKG            Assessment and Plan   Diagnoses and all orders for this visit:    1. Coronary artery disease involving native coronary artery of native heart with unstable angina pectoris (Primary)  -     Case Request Cath Lab: Left Heart Cath  -     CBC (No Diff); Future  -     Comprehensive Metabolic Panel; Future  -     BNP; Future  -     ECG 12 Lead    2. Shortness of breath    3. Ischemic cardiomyopathy  -     Case Request Cath Lab: Left Heart Cath  -     CBC (No Diff); Future  -     Comprehensive Metabolic Panel; Future  -     BNP; Future  -     ECG 12 Lead    4. Primary hypertension  -     Case Request Cath Lab: Left Heart Cath  -     CBC (No Diff); Future  -     Comprehensive Metabolic Panel; Future  -     BNP; Future  -     ECG 12 Lead    5. Mixed hyperlipidemia    1.  Coronary artery disease with anginal equivalent.  The patient has shortness of breath which began as orthopnea but now occurs while sitting up at rest.  This is  quite reminiscent of her previous myocardial ischemic symptoms.  The patient has known left ventricular systolic dysfunction.  She is quadriplegic.  Lexiscan stress test could have adverse systemic effects and dobutamine stress echo could be difficult to interpret due to pre-existing left ventricular wall motion abnormalities.  I think the best course of action in this instance is to proceed directly to cardiac catheterization since her symptoms are occurring at rest.  Dr. Feliberto Burdick has been contacted and has graciously agreed to schedule this procedure for later this week.  The patient understands the nature of the procedure including its risks benefits and other options and agrees to proceed.    2.  Shortness of breath.  Possibly an anginal equivalent as discussed in #1 above.  Also consider volume overload.  Lab work including a BNP has been ordered.  Also have ordered a CBC and complete metabolic panel.    3.  Ischemic cardiomyopathy.  The patient had a left ventricular ejection fraction of approximately 40% in 2014 at the time of her ischemic event.  She is on diuretics and beta-blockers.  Her orthostasis makes use of vasodilators problematic, but she is able to tolerate 5 mg of lisinopril.  Again, a BNP and other lab work is ordered.    4.  Hypertension.  Current blood pressure is 120/74.  She is on lisinopril and metoprolol as well as diuretic therapy as described above.    5.  Hyperlipidemia.  Continue atorvastatin 40 mg daily.  No recent lipid panel available.    All questions were answered.  A return visit is scheduled in 1 month.  The patient is encouraged to contact us for any further concerns and she has been advised to come to the nearest emergency room if symptoms worsen in intensity, frequency or duration.    Thank you very much for referring this pleasant lady.      There are no Patient Instructions on file for this visit.       Follow Up   Return in about 1 month (around 7/24/2024) for Next  scheduled follow up.  Patient was given instructions and counseling regarding her condition or for health maintenance advice. Please see specific information pulled into the AVS if appropriate.

## 2024-06-25 PROBLEM — I10 PRIMARY HYPERTENSION: Status: ACTIVE | Noted: 2024-06-24

## 2024-06-25 PROBLEM — I25.110 CORONARY ARTERY DISEASE INVOLVING NATIVE CORONARY ARTERY OF NATIVE HEART WITH UNSTABLE ANGINA PECTORIS: Status: ACTIVE | Noted: 2024-06-24

## 2024-06-25 PROBLEM — I25.5 ISCHEMIC CARDIOMYOPATHY: Status: ACTIVE | Noted: 2024-06-24

## 2024-06-28 ENCOUNTER — HOSPITAL ENCOUNTER (OUTPATIENT)
Facility: HOSPITAL | Age: 59
Setting detail: HOSPITAL OUTPATIENT SURGERY
Discharge: HOME | End: 2024-06-28
Attending: EMERGENCY MEDICINE | Admitting: EMERGENCY MEDICINE
Payer: MEDICARE

## 2024-06-28 VITALS
WEIGHT: 130 LBS | SYSTOLIC BLOOD PRESSURE: 130 MMHG | OXYGEN SATURATION: 97 % | DIASTOLIC BLOOD PRESSURE: 60 MMHG | HEART RATE: 69 BPM | TEMPERATURE: 97.3 F | BODY MASS INDEX: 21.66 KG/M2 | HEIGHT: 65 IN | RESPIRATION RATE: 17 BRPM

## 2024-06-28 DIAGNOSIS — I25.110 CORONARY ARTERY DISEASE INVOLVING NATIVE CORONARY ARTERY OF NATIVE HEART WITH UNSTABLE ANGINA PECTORIS: ICD-10-CM

## 2024-06-28 DIAGNOSIS — I25.5 ISCHEMIC CARDIOMYOPATHY: ICD-10-CM

## 2024-06-28 DIAGNOSIS — I10 PRIMARY HYPERTENSION: ICD-10-CM

## 2024-06-28 LAB
ANION GAP SERPL CALCULATED.3IONS-SCNC: 7 MMOL/L (ref 5–15)
BASOPHILS # BLD AUTO: 0.01 10*3/MM3 (ref 0–0.2)
BASOPHILS NFR BLD AUTO: 0.2 % (ref 0–1.5)
BUN SERPL-MCNC: 32 MG/DL (ref 8–23)
BUN/CREAT SERPL: 114.3 (ref 7–25)
CALCIUM SPEC-SCNC: 9.9 MG/DL (ref 8.6–10.5)
CHLORIDE SERPL-SCNC: 99 MMOL/L (ref 98–107)
CO2 SERPL-SCNC: 32 MMOL/L (ref 22–29)
CREAT SERPL-MCNC: 0.28 MG/DL (ref 0.57–1)
DEPRECATED RDW RBC AUTO: 57.1 FL (ref 37–54)
EGFRCR SERPLBLD CKD-EPI 2021: 123.7 ML/MIN/1.73
EOSINOPHIL # BLD AUTO: 0.11 10*3/MM3 (ref 0–0.4)
EOSINOPHIL NFR BLD AUTO: 2.7 % (ref 0.3–6.2)
ERYTHROCYTE [DISTWIDTH] IN BLOOD BY AUTOMATED COUNT: 17.5 % (ref 12.3–15.4)
GLUCOSE SERPL-MCNC: 96 MG/DL (ref 65–99)
HCT VFR BLD AUTO: 34.8 % (ref 34–46.6)
HGB BLD-MCNC: 10.2 G/DL (ref 12–15.9)
IMM GRANULOCYTES # BLD AUTO: 0.01 10*3/MM3 (ref 0–0.05)
IMM GRANULOCYTES NFR BLD AUTO: 0.2 % (ref 0–0.5)
INR PPP: 0.91 (ref 0.91–1.09)
LYMPHOCYTES # BLD AUTO: 1.1 10*3/MM3 (ref 0.7–3.1)
LYMPHOCYTES NFR BLD AUTO: 27 % (ref 19.6–45.3)
MCH RBC QN AUTO: 26.4 PG (ref 26.6–33)
MCHC RBC AUTO-ENTMCNC: 29.3 G/DL (ref 31.5–35.7)
MCV RBC AUTO: 90.2 FL (ref 79–97)
MONOCYTES # BLD AUTO: 0.35 10*3/MM3 (ref 0.1–0.9)
MONOCYTES NFR BLD AUTO: 8.6 % (ref 5–12)
NEUTROPHILS NFR BLD AUTO: 2.5 10*3/MM3 (ref 1.7–7)
NEUTROPHILS NFR BLD AUTO: 61.3 % (ref 42.7–76)
NRBC BLD AUTO-RTO: 0 /100 WBC (ref 0–0.2)
PLATELET # BLD AUTO: 107 10*3/MM3 (ref 140–450)
PMV BLD AUTO: 12.2 FL (ref 6–12)
POTASSIUM SERPL-SCNC: 5.1 MMOL/L (ref 3.5–5.2)
PROTHROMBIN TIME: 12.6 SECONDS (ref 11.8–14.8)
RBC # BLD AUTO: 3.86 10*6/MM3 (ref 3.77–5.28)
SODIUM SERPL-SCNC: 138 MMOL/L (ref 136–145)
WBC NRBC COR # BLD AUTO: 4.08 10*3/MM3 (ref 3.4–10.8)

## 2024-06-28 PROCEDURE — S0260 H&P FOR SURGERY: HCPCS | Performed by: EMERGENCY MEDICINE

## 2024-06-28 PROCEDURE — 99152 MOD SED SAME PHYS/QHP 5/>YRS: CPT | Performed by: EMERGENCY MEDICINE

## 2024-06-28 PROCEDURE — 25510000001 IOPAMIDOL PER 1 ML: Performed by: EMERGENCY MEDICINE

## 2024-06-28 PROCEDURE — 25010000002 DIPHENHYDRAMINE PER 50 MG: Performed by: EMERGENCY MEDICINE

## 2024-06-28 PROCEDURE — 80048 BASIC METABOLIC PNL TOTAL CA: CPT | Performed by: EMERGENCY MEDICINE

## 2024-06-28 PROCEDURE — 93458 L HRT ARTERY/VENTRICLE ANGIO: CPT | Performed by: EMERGENCY MEDICINE

## 2024-06-28 PROCEDURE — 85025 COMPLETE CBC W/AUTO DIFF WBC: CPT | Performed by: EMERGENCY MEDICINE

## 2024-06-28 PROCEDURE — C1760 CLOSURE DEV, VASC: HCPCS | Performed by: EMERGENCY MEDICINE

## 2024-06-28 PROCEDURE — C1894 INTRO/SHEATH, NON-LASER: HCPCS | Performed by: EMERGENCY MEDICINE

## 2024-06-28 PROCEDURE — 25010000002 FENTANYL CITRATE (PF) 100 MCG/2ML SOLUTION: Performed by: EMERGENCY MEDICINE

## 2024-06-28 PROCEDURE — 85610 PROTHROMBIN TIME: CPT | Performed by: EMERGENCY MEDICINE

## 2024-06-28 PROCEDURE — C1769 GUIDE WIRE: HCPCS | Performed by: EMERGENCY MEDICINE

## 2024-06-28 PROCEDURE — 25010000002 MIDAZOLAM HCL (PF) 5 MG/5ML SOLUTION: Performed by: EMERGENCY MEDICINE

## 2024-06-28 PROCEDURE — 25810000003 SODIUM CHLORIDE 0.9 % SOLUTION: Performed by: EMERGENCY MEDICINE

## 2024-06-28 RX ORDER — SODIUM CHLORIDE 9 MG/ML
100 INJECTION, SOLUTION INTRAVENOUS CONTINUOUS
Status: DISCONTINUED | OUTPATIENT
Start: 2024-06-28 | End: 2024-06-28 | Stop reason: HOSPADM

## 2024-06-28 RX ORDER — MIDAZOLAM HYDROCHLORIDE 5 MG/5ML
INJECTION, SOLUTION INTRAMUSCULAR; INTRAVENOUS
Status: DISCONTINUED | OUTPATIENT
Start: 2024-06-28 | End: 2024-06-28 | Stop reason: HOSPADM

## 2024-06-28 RX ORDER — SODIUM CHLORIDE 9 MG/ML
INJECTION, SOLUTION INTRAVENOUS
Status: COMPLETED | OUTPATIENT
Start: 2024-06-28 | End: 2024-06-28

## 2024-06-28 RX ORDER — SODIUM CHLORIDE 9 MG/ML
50 INJECTION, SOLUTION INTRAVENOUS CONTINUOUS
Status: DISCONTINUED | OUTPATIENT
Start: 2024-06-28 | End: 2024-06-28 | Stop reason: HOSPADM

## 2024-06-28 RX ORDER — ACETAMINOPHEN 325 MG/1
650 TABLET ORAL EVERY 4 HOURS PRN
Status: DISCONTINUED | OUTPATIENT
Start: 2024-06-28 | End: 2024-06-28 | Stop reason: HOSPADM

## 2024-06-28 RX ORDER — DIPHENHYDRAMINE HYDROCHLORIDE 50 MG/ML
INJECTION INTRAMUSCULAR; INTRAVENOUS
Status: DISCONTINUED | OUTPATIENT
Start: 2024-06-28 | End: 2024-06-28 | Stop reason: HOSPADM

## 2024-06-28 RX ORDER — FENTANYL CITRATE 50 UG/ML
INJECTION, SOLUTION INTRAMUSCULAR; INTRAVENOUS
Status: DISCONTINUED | OUTPATIENT
Start: 2024-06-28 | End: 2024-06-28 | Stop reason: HOSPADM

## 2024-06-28 RX ORDER — LIDOCAINE HYDROCHLORIDE 20 MG/ML
INJECTION, SOLUTION INFILTRATION; PERINEURAL
Status: DISCONTINUED | OUTPATIENT
Start: 2024-06-28 | End: 2024-06-28 | Stop reason: HOSPADM

## 2024-06-28 RX ORDER — NITROGLYCERIN 0.4 MG/1
0.4 TABLET SUBLINGUAL
Status: DISCONTINUED | OUTPATIENT
Start: 2024-06-28 | End: 2024-06-28 | Stop reason: HOSPADM

## 2024-06-28 NOTE — CONSULTS
22 gauge 1 inch PIV placed in right hand with 2 attempt(s). Labs drawn with first IV attempt and given to RN.

## 2024-06-28 NOTE — H&P
"Patient seen and examined at bedside.  The history and physical not changed as below.    Will be performing a diagnostic left heart catheterization today with possible intervention based on findings.    Risk, benefits and alternatives were explained to the patient and she wished to proceed.    Chief Complaint  Shortness of Breath (Former Dr. Lorenzana pt)        Subjective  Kathie Lynn presents to [facility] CARDIOLOGY  Shortness of Breath     Kathie Lynn is a 58-year-old female referred for cardiology evaluation because of the recent development of dyspnea at rest and orthopnea.  The patient is quadriplegic and is a resident of a nursing home.  In mid July 2014 she had an anterior non-STEMI and underwent LAD stent placement using a 2.75 x 23 mm Xience drug-eluting stent by Dr. Lorenzana.  She had some evidence of left heart failure at that time and her left ventricular ejection fraction was approximately 40%.     In this setting, she developed episodic dyspnea about 6 weeks ago.  At first it was predominantly orthopnea and that it only occurred when she was laying down.  It now occurs at any time, night or day, laying or sitting.  It subsides spontaneously and is now occurring on a daily basis.  The patient took nitroglycerin a long time ago and had some adverse effects from it.  I suspect that it was hypotension.  The patient is on beta-blockers and loop diuretics in the form of furosemide.  She has a history of orthostasis for which she takes midodrine and Florinef.  She has had no recent palpitations, syncope or near syncope.  She does not have PND.  She is a former smoker but has now quit.              Objective  Vital Signs:  /74   Pulse 68   Wt 57.2 kg (126 lb)   BMI 20.97 kg/m²   Estimated body mass index is 20.97 kg/m² as calculated from the following:    Height as of 5/3/21: 165.1 cm (65\").    Weight as of this encounter: 57.2 kg (126 lb).              Physical Exam  A " 58-year-old female in a wheelchair.  She has contractures of the hand and other stigmata of longstanding quadriplegia.  She is awake, alert and oriented.  She appears to be in no distress.  She is accompanied by her niece, Radha Black who is a  in this office.  HEENT.  Normocephalic.  No scleral icterus.  Neck: No carotid bruits or noted jugular venous distention.  Lungs: Normal respiratory effort.  Lungs are clear to auscultation.  Heart: Regular rhythm with normal S1 and S2 and no audible murmurs or gallops sounds.  Abdomen no tenderness masses or organomegaly.  Extremities: 1+ pretibial edema.  No cyanosis.  Pedal pulses intact.  Neurologic: Not formally examined.  The patient has quadriplegia.           Result Review  :                    ECG 12 Lead     Date/Time: 6/24/2024 4:19 PM  Performed by: Chet Manjarrez MD     Authorized by: Chet Manjarrez MD  Comparison: compared with previous ECG from 2/15/2017  Comparison to previous ECG: Right atrial enlargement has appeared and prolonged QT interval is no longer seen when compared to previous tracing.  Rhythm: sinus rhythm  Rate: normal  Conduction: conduction normal  ST Segments: ST segments normal  T Waves: T waves normal  QRS axis: right  Other findings: bilateral atrial abnormality     Clinical impression: abnormal EKG                 Assessment  Assessment and Plan   Diagnoses and all orders for this visit:     1. Coronary artery disease involving native coronary artery of native heart with unstable angina pectoris (Primary)  -     Case Request Cath Lab: Left Heart Cath  -     CBC (No Diff); Future  -     Comprehensive Metabolic Panel; Future  -     BNP; Future  -     ECG 12 Lead     2. Shortness of breath     3. Ischemic cardiomyopathy  -     Case Request Cath Lab: Left Heart Cath  -     CBC (No Diff); Future  -     Comprehensive Metabolic Panel; Future  -     BNP; Future  -     ECG 12 Lead     4. Primary hypertension  -      Case Request Cath Lab: Left Heart Cath  -     CBC (No Diff); Future  -     Comprehensive Metabolic Panel; Future  -     BNP; Future  -     ECG 12 Lead     5. Mixed hyperlipidemia     1.  Coronary artery disease with anginal equivalent.  The patient has shortness of breath which began as orthopnea but now occurs while sitting up at rest.  This is quite reminiscent of her previous myocardial ischemic symptoms.  The patient has known left ventricular systolic dysfunction.  She is quadriplegic.  Lexiscan stress test could have adverse systemic effects and dobutamine stress echo could be difficult to interpret due to pre-existing left ventricular wall motion abnormalities.  I think the best course of action in this instance is to proceed directly to cardiac catheterization since her symptoms are occurring at rest.  Dr. Feliberto Burdick has been contacted and has graciously agreed to schedule this procedure for later this week.  The patient understands the nature of the procedure including its risks benefits and other options and agrees to proceed.     2.  Shortness of breath.  Possibly an anginal equivalent as discussed in #1 above.  Also consider volume overload.  Lab work including a BNP has been ordered.  Also have ordered a CBC and complete metabolic panel.     3.  Ischemic cardiomyopathy.  The patient had a left ventricular ejection fraction of approximately 40% in 2014 at the time of her ischemic event.  She is on diuretics and beta-blockers.  Her orthostasis makes use of vasodilators problematic, but she is able to tolerate 5 mg of lisinopril.  Again, a BNP and other lab work is ordered.     4.  Hypertension.  Current blood pressure is 120/74.  She is on lisinopril and metoprolol as well as diuretic therapy as described above.     5.  Hyperlipidemia.  Continue atorvastatin 40 mg daily.  No recent lipid panel available.     All questions were answered.  A return visit is scheduled in 1 month.  The patient is  encouraged to contact us for any further concerns and she has been advised to come to the nearest emergency room if symptoms worsen in intensity, frequency or duration.     Thank you very much for referring this pleasant lady.        There are no Patient Instructions on file for this visit.           Follow Up   Return in about 1 month (around 7/24/2024) for Next scheduled follow up.  Patient was given instructions and counseling regarding her condition or for health maintenance advice. Please see specific information pulled into the AVS if appropriate.

## 2024-06-28 NOTE — OP NOTE
[facility]    Date of procedure: 6/28/2024     Procedures performed:     1.  Access to the right femoral artery via modified Seldinger technique and ultrasound guidance  2.  Left heart catheterization with retrograde crossing the aortic valve to the left ventricle pressures were recorded  3.  LV ventriculography  4.  Selective bilateral coronary angiography  5.  Patent hemostasis achieved in the right femoral artery access site using a 6 Iraqi Angio-Seal closure device  6.  Conscious sedation with continuous hemodynamic monitoring for 20 minutes    Risk, Benefits, and Alternatives discussed with the patient and/or family.  Plan is for moderate sedation and the patient agrees to proceed with the procedure.  An immediate assessment was done prior to the administration of moderate sedation     Indication: Coronary artery disease status post PCI approximately 10 years ago, progressively worsening chest discomfort and dyspnea very similar to symptoms 10 years ago when stent was placed  Premedication: Versed, fentanyl  Contrast: Isovue 82 cc  Radiation: Flouro time= 1.2 minutes. Air Kerma= 78 mGy  Catheters: 6Fr JL4, 6Fr JR4, 6Fr angled pigtail      Procedural details:    The patient was brought to the cath lab and prepped and draped in the usual fashion.  Access was obtained in the right femoral artery via modified Seldinger technique and ultrasound guidance.  A 6 Iraqi sheath was placed into the artery and flushed.  Next a JL 4 catheter was inserted and used to engage the left main and selective left coronary angiography is performed in multiple views.  A JR4 was then inserted and used to engage the right and selective right coronary angiography was performed in multiple views.  Next a pigtail catheter was inserted and used to cross the aortic valve to the left ventricle pressures were recorded LV ventriculography was performed.  This catheter was then pulled back across aortic valve and  again pressures were recorded.  Everything was then withdrawn to the sheath and the sheath was flushed.  Patent hemostasis was achieved in the right femoral artery access site using a 6 Maltese Angio-Seal closure device.  Patient tolerated the procedure well without any complications.  She left the Cath Lab in stable condition.    I supervised the administration of conscious sedation by nursing staff throughout the case.  First dose was given at 1437 and the end of my face-to-face encounter was at 1457, accounting for a total of 20 minutes of supervision.  During the case, continuous pulse oximetry, heart rate, blood pressure, and patient status were monitored.     Findings:    Hemodynamics:    Aortic: 146/73 mmHg  LV: 163/1 mmHg  LVEDP: 12 mmHg    Left ventriculography:  1. The contractility of the left ventricle is hypercontractile.  Estimated ejection fraction greater than 70%.  2. The left ventricle is normal in wall thickness and chamber size.  3. There is no significant mitral regurgitation or aortic insufficiency.    Selective coronary angiography:     Left Main: Large-caliber vessel that bifurcates into the LAD and left circumflex coronary arteries, no angiographic evidence of stenosis, SUNITA-3 flow    LAD: Large-caliber vessel with a patent stent present in the proximal segment with approximately 20 to 30% in-stent stenosis only, remainder the vessel has minor luminal irregularities, SUNITA-3 flow    Diagonals: First diagonal small caliber, second diagonal is moderate caliber with minor disease and very tortuous, third diagonal is moderate caliber with no significant disease    Left circumflex: Large-caliber vessel that is very tortuous in its mid and distal segment, SUNITA-3 flow with no significant stenosis    Obtuse marginals: Small caliber vessels    RCA: Very large caliber, dominant vessel with minor disease noted in the midsegment of approximately 10 to 20% stenosis, remainder the vessel has no angiographic  evidence of stenosis, SUNITA-3 flow    PDA/LASHAWN: The PDA is moderate caliber with no significant disease, the LASHAWN is small to moderate caliber with no significant disease      Estimated Blood Loss: 20 cc    Specimens: None    Complications: None       Impression:  1.  Coronary artery disease as described above including a widely patent stent in the proximal LAD with only minimal in-stent stenosis of approximately 20 to 30%  2.  Hypertension  3.  Hyperlipidemia     Plan:   1.  Monitor post procedure with plans for discharge home later today  2.  Follow-up with Dr. Manjarrez to continue to optimize her cardiovascular regimen    Feliberto Burdick, DO  Interventional cardiology  [facility]  June 28, 2024

## 2024-06-28 NOTE — Clinical Note
Allergies reviewed.  H&P note has been confirmed for the patient. Procedural consent has been signed.  Blood consent has not been signed. Staff has reviewed the patient's labs.  Labs have been reviewed and show abnormalities. Physician is aware of labs.

## 2024-12-08 PROBLEM — J18.9 LEFT LOWER LOBE PNEUMONIA: Status: ACTIVE | Noted: 2024-01-01

## 2024-12-08 NOTE — ED PROVIDER NOTES
Subjective   History of Present Illness    Patient is a pleasant 59-year-old female who arrived from nursing home due to hypotension, hypoxia with worsening shortness of breath, and worsening pressure wounds.    Patient does have significant history of severe spinal cord injury and cervical fusion with cervical quadriplegia, chronic oxygen dependency with known history of emphysema, chronic pressure ulcers x 4, hypertension, neurogenic bladder, myocardial infarction, recurrent UTIs.    Patient describes that she does wear oxygen at the nursing home facility and has been short of breath for the past 6 months.  However, in the past 6 days, she has significantly worsening difficulty breathing when lying in bed.  She feels weak.  She denies any obvious fever at the nursing home, but feels hot today.  Patient cannot feel from breast down.  She denies any increased pressure.  She denies any odor with her urine.  She was recently treated for urinary tract infection.  Patient denies change in cough.  Her sister was present at bedside as well and states that she seems to be declining.  The patient feels really weak today and look more short of breath.  When she got there, the patient's blood pressure was initially 80/50 and that dropped down to 70/40 this morning.  They did give her some type of medicine to increase her blood pressure but because of her worsening symptoms, EMS was notified and she was brought here to be further evaluated.      Review of Systems   Constitutional:  Positive for activity change, fatigue and fever.   HENT: Negative.     Respiratory:  Positive for shortness of breath.    Gastrointestinal: Negative.    Genitourinary: Negative.    Skin:  Positive for wound.   Neurological:  Positive for weakness.   All other systems reviewed and are negative.      Past Medical History:   Diagnosis Date    Anxiety     Burst fracture of cervical vertebra 1984    Calculus of bladder     Calculus of kidney      Cigarette smoker     COPD (chronic obstructive pulmonary disease)     Depression     Dysuria     Hypertension     MI (myocardial infarction)     Neurogenic bladder     Pressure ulcer     SACRAL AREA    Spinal cord injury, cervical region     UTI (urinary tract infection)        Allergies   Allergen Reactions    Adhesive Tape Itching    Sulfa Antibiotics Rash    Tape Rash     Can tolerate paper tape       Past Surgical History:   Procedure Laterality Date    AUGMENTATION MAMMAPLASTY      BLADDER AUGMENTATION      CARDIAC CATHETERIZATION N/A 2024    Procedure: Left Heart Cath;  Surgeon: Feliberto Burdick DO;  Location:  [facility]ATH [facility];  Service: Cardiovascular;  Laterality: N/A;    CERVICAL SPINE POSTERIOR      C6-C7 by Dr. Hull - [facility]     SECTION      COCCYX FRACTURE SURGERY      COLONOSCOPY  2008    GASTROSTOMY FEEDING TUBE INSERTION      GASTROSTOMY FEEDING TUBE INSERTION      moved to left side    PAIN PUMP INSERTION/REVISION  2012    Performed by Dr. David Blanc    PAIN PUMP INSERTION/REVISION Left 2019    Procedure: PAIN PUMP REVISION, left, spine;  Surgeon: Preston Abdalla MD;  Location:  [facility] OR;  Service: Neurosurgery    ULNAR NERVE DECOMPRESSION Bilateral     Performed by Dr. David Blanc        Family History   Problem Relation Age of Onset    Cancer Mother        Social History     Socioeconomic History    Marital status:    Tobacco Use    Smoking status: Former     Average packs/day: 0.5 packs/day for 44.0 years (22.0 ttl pk-yrs)     Types: Cigarettes     Start date:      Passive exposure: Past    Smokeless tobacco: Never   Vaping Use    Vaping status: Never Used   Substance and Sexual Activity    Alcohol use: No    Drug use: No    Sexual activity: Defer       Prior to Admission medications    Medication Sig Start Date End Date Taking? Authorizing Provider   PIPE DISKUS 250-50 MCG/DOSE DISKUS  19   Provider,  MD Tj   albuterol (ACCUNEB) 0.63 MG/3ML nebulizer solution Take 1 ampule by nebulization every 6 hours as needed for Wheezing    Tj Decker MD   ALPRAZolam (XANAX) 0.5 MG tablet Take 1 tablet by mouth 3 (Three) Times a Day As Needed. 12/14/18   Tj Decker MD   ascorbic acid (VITAMIN C) 500 MG tablet Take 500 mg by mouth Daily.  Patient not taking: Reported on 6/24/2024    Tj Decker MD   aspirin 81 MG tablet Take 81 mg by mouth Daily.  Patient not taking: Reported on 6/24/2024    Tj Decker MD   atorvastatin (LIPITOR) 40 MG tablet TAKE ONE TABLET BY MOUTH AT BEDTIME 8/14/17   Guevara Lorenzana MD   baclofen (LIORESAL) 10 MG tablet Take 1 tablet by mouth 4 (Four) Times a Day. 7/9/19   Tj Decker MD   calcium carbonate-vitamin d 600-400 MG-UNIT per tablet Take 1 tablet by mouth Daily.    Tj Decker MD   carBAMazepine (TEGretol) 200 MG tablet Take 1 tablet by mouth 3 (Three) Times a Day.    Tj Decker MD   celecoxib (CeleBREX) 200 MG capsule  8/7/19   Tj Decker MD   cyanocobalamin 1000 MCG/ML injection Inject 1 mL into the appropriate muscle as directed by prescriber Every 28 (Twenty-Eight) Days.    Tj Decker MD   Cymbalta 60 MG capsule Take 1 capsule by mouth Daily.    Tj Decker MD   Diclofenac Sodium (VOLTAREN) 1 % gel gel     Tj Decker MD   escitalopram (LEXAPRO) 20 MG tablet Take 1 tablet by mouth Daily. 7/9/19   Tj Decker MD   fludrocortisone 0.1 MG tablet Take 1 tablet by mouth 3 (Three) Times a Day. 7/9/19   Tj Decker MD   furosemide (LASIX) 20 MG tablet TAKE 1 & 1\2 TABLETS BY MOUTH EVERY MORNING & TAKE 1\2 TABLET EVERYEVENING  Patient taking differently: As Needed. 9/27/16   Guevara Lorenzana MD   guaiFENesin (MUCINEX) 600 MG 12 hr tablet Take 2 tablets by mouth Daily.    Tj Decker MD   ipratropium-albuterol (COMBIVENT RESPIMAT)   MCG/ACT inhaler Inhale 1 puff 4 (Four) Times a Day.    Tj Decker MD   Lactobacillus (Acidophilus Probiotic) 0.5 MG tablet  5/21/24   Tj Decker MD   levocetirizine (XYZAL) 5 MG tablet Take 1 tablet by mouth Every Evening. 7/9/19   Tj Decker MD   lisinopril (PRINIVIL,ZESTRIL) 5 MG tablet Take 1 tablet by mouth Daily. 12/14/18   Tj Decker MD   loperamide (IMODIUM A-D) 2 MG tablet     Tj Decker MD   loratadine (CLARITIN) 10 MG tablet     Tj Decker MD   LYRICA 150 MG capsule Take 1 capsule by mouth Daily. 7/9/19   Tj Decker MD   metoprolol succinate XL (TOPROL-XL) 25 MG 24 hr tablet TAKE ONE TABLET BY MOUTH DAILY -TAKE WITH FOOD OR MILK  Patient not taking: Reported on 6/24/2024 5/22/17   Guevara Lorenzana MD   midodrine (PROAMATINE) 5 MG tablet Take 1 tablet by mouth 3 (Three) Times a Day. 7/9/19   Tj Decker MD   montelukast (SINGULAIR) 10 MG tablet  4/27/21   Tj Decker MD   nitrofurantoin, macrocrystal-monohydrate, (MACROBID) 100 MG capsule  8/2/19   Tj Decker MD   nitroglycerin (NITROSTAT) 0.4 MG SL tablet Place 1 tablet under the tongue.    Tj Decker MD   ondansetron ODT (ZOFRAN-ODT) 4 MG disintegrating tablet Take 1 tablet by mouth Every 12 (Twelve) Hours As Needed. 7/5/19   Tj Decker MD   oxyCODONE-acetaminophen (PERCOCET)  MG per tablet Take 1 tablet by mouth Every 6 (Six) Hours As Needed for Moderate Pain.    Tj Decker MD   pantoprazole (PROTONIX) 40 MG pack packet  6/3/24   Tj Decker MD   pentoxifylline (TRENtal) 400 MG CR tablet  4/27/21   Tj Decker MD   potassium chloride (MICRO-K) 10 MEQ CR capsule Take 1 capsule by mouth Daily. 5/9/19   Tj Decker MD   Pregabalin (LYRICA PO) Take  by mouth.    Tj Decker MD   promethazine (PHENERGAN) 25 MG/ML injection     Provider, MD Tj  "  roflumilast (DALIRESP) 250 MCG tablet tablet  6/13/24   Tj Decker MD   Santyl 250 UNIT/GM ointment     Tj Decker MD   sodium chloride (NS) 0.9 % irrigation Irrigate with 1,000 mL to the affected area as directed by provider 1 (One) Time. 7/17/19   Tj Decker MD   tiZANidine (ZANAFLEX) 4 MG tablet Take 1 tablet by mouth At Night As Needed for Muscle Spasms.    Tj Decker MD   Trelegy Ellipta 100-62.5-25 MCG/ACT inhaler  5/27/24   Tj Decker MD   Zinc 50 MG tablet Take 1 tablet by mouth Daily.    Tj Decker MD   zinc oxide (Desitin) 13 % cream cream     Tj Decker MD   zolpidem (AMBIEN) 10 MG tablet Take 1 tablet by mouth Daily.    Tj Decker MD       Medications   sodium chloride 0.9 % flush 10 mL (has no administration in time range)   vancomycin 1250 mg/250 mL 0.9% NS IVPB (1,250 mg Intravenous Given 12/8/24 1736)   acetaminophen (TYLENOL) 160 MG/5ML oral solution 650 mg (650 mg Per G Tube Given 12/8/24 1737)   piperacillin-tazobactam (ZOSYN) 3.375 g IVPB in 100 mL NS MBP (CD) (0 g Intravenous Stopped 12/8/24 1736)       /65 (BP Location: Left arm, Patient Position: Sitting)   Pulse 80   Temp 100.1 °F (37.8 °C) (Rectal)   Resp 28   Ht 165.1 cm (65\")   Wt 59.9 kg (132 lb)   SpO2 92%   BMI 21.97 kg/m²       Objective   Physical Exam  Vitals and nursing note reviewed.   Constitutional:       General: She is in acute distress.      Appearance: She is obese. She is ill-appearing and toxic-appearing. She is not diaphoretic.      Interventions: Nasal cannula in place.      Comments: Patient is O2 sats dropped down to 80% on 4 L nasal cannula.   HENT:      Head: Normocephalic and atraumatic.      Mouth/Throat:      Mouth: Mucous membranes are dry.   Eyes:      Extraocular Movements: Extraocular movements intact.      Conjunctiva/sclera: Conjunctivae normal.      Pupils: Pupils are equal, round, and reactive to light. "   Neck:      Trachea: No tracheal deviation.   Cardiovascular:      Rate and Rhythm: Normal rate and regular rhythm.      Heart sounds: Normal heart sounds. No murmur heard.  Pulmonary:      Effort: Pulmonary effort is normal.      Breath sounds: Normal breath sounds. Decreased breath sounds present.   Abdominal:      General: Abdomen is protuberant. Bowel sounds are normal. There is no distension.      Palpations: Abdomen is soft. There is no mass.      Tenderness: There is no abdominal tenderness. There is no guarding or rebound.      Comments: Patient cannot feel on abdominal examination   Musculoskeletal:      Cervical back: Normal range of motion and neck supple.   Skin:     General: Skin is warm and dry.      Coloration: Skin is pale.      Comments: With the nurses assistance, we rolled the patient on each side and I was able to evaluate her for stage IV wounds on her sacrum, buttock, and right leg.  Specifically the one on her proximal right posterior lower extremity and perineal region is deeper and more erythematous than usual confirmed by sister who is usually monitoring the wounds.  There is a little bit of drainage on the sacrum that is yellow exudate.  No odor noted.   Neurological:      Mental Status: She is alert and oriented to person, place, and time.      Comments: Patient keeps her upper extremities contractured and is paralyzed from breast down.   Psychiatric:         Behavior: Behavior normal. Behavior is cooperative.         Thought Content: Thought content normal.         Judgment: Judgment normal.         Procedures         Lab Results (last 24 hours)       Procedure Component Value Units Date/Time    CBC & Differential [[id number]]  (Abnormal) Collected: 12/08/24 1607    Specimen: Blood Updated: 12/08/24 1656    Narrative:      The following orders were created for panel order CBC & Differential.  Procedure                               Abnormality         Status                     ---------                                -----------         ------                     CBC Auto Differential[[id number]]        Abnormal            Final result                 Please view results for these tests on the individual orders.    Comprehensive Metabolic Panel [[id number]]  (Abnormal) Collected: 12/08/24 1607    Specimen: Blood Updated: 12/08/24 1652     Glucose 124 mg/dL      BUN 49 mg/dL      Creatinine 0.31 mg/dL      Sodium 139 mmol/L      Potassium 4.8 mmol/L      Comment: Slight hemolysis detected by analyzer. Result may be falsely elevated.        Chloride 100 mmol/L      CO2 32.0 mmol/L      Calcium 9.5 mg/dL      Total Protein 7.6 g/dL      Albumin 2.7 g/dL      ALT (SGPT) 28 U/L      AST (SGOT) 32 U/L      Comment: Slight hemolysis detected by analyzer. Result may be falsely elevated.        Alkaline Phosphatase 129 U/L      Total Bilirubin 0.2 mg/dL      Globulin 4.9 gm/dL      A/G Ratio 0.6 g/dL      BUN/Creatinine Ratio 158.1     Anion Gap 7.0 mmol/L      eGFR 119.9 mL/min/1.73     Narrative:      GFR Normal >60  Chronic Kidney Disease <60  Kidney Failure <15      BNP [[id number]]  (Abnormal) Collected: 12/08/24 1607    Specimen: Blood Updated: 12/08/24 1646     proBNP 2,050.0 pg/mL     Narrative:      This assay is used as an aid in the diagnosis of individuals suspected of having heart failure. It can be used as an aid in the diagnosis of acute decompensated heart failure (ADHF) in patients presenting with signs and symptoms of ADHF to the emergency department (ED). In addition, NT-proBNP of <300 pg/mL indicates ADHF is not likely.    Age Range Result Interpretation  NT-proBNP Concentration (pg/mL:      <50             Positive            >450                   Gray                 300-450                    Negative             <300    50-75           Positive            >900                  Gray                300-900                  Negative            <300      >75             Positive             ">1800                  [place]                300-1800                  Negative            <300    High Sensitivity Troponin T [[id number]]  (Abnormal) Collected: 12/08/24 1607    Specimen: Blood Updated: 12/08/24 1646     HS Troponin T 31 ng/L     Narrative:      High Sensitive Troponin T Reference Range:  <14.0 ng/L- Negative Female for AMI  <22.0 ng/L- Negative Male for AMI  >=14 - Abnormal Female indicating possible myocardial injury.  >=22 - Abnormal Male indicating possible myocardial injury.   Clinicians would have to utilize clinical acumen, EKG, Troponin, and serial changes to determine if it is an Acute Myocardial Infarction or myocardial injury due to an underlying chronic condition.         Procalcitonin [[id number]]  (Abnormal) Collected: 12/08/24 1607    Specimen: Blood Updated: 12/08/24 1656     Procalcitonin 0.30 ng/mL     Narrative:      As a Marker for Sepsis (Non-Neonates):    1. <0.5 ng/mL represents a low risk of severe sepsis and/or septic shock.  2. >2 ng/mL represents a high risk of severe sepsis and/or septic shock.    As a Marker for Lower Respiratory Tract Infections that require antibiotic therapy:    PCT on Admission    Antibiotic Therapy       6-12 Hrs later    >0.5                Strongly Recommended  >0.25 - <0.5        Recommended   0.1 - 0.25          Discouraged              Remeasure/reassess PCT  <0.1                Strongly Discouraged     Remeasure/reassess PCT    As 28 day mortality risk marker: \"Change in Procalcitonin Result\" (>80% or <=80%) if Day 0 (or Day 1) and Day 4 values are available. Refer to http://www.[facility]-pct-calculator.com    Change in PCT <=80%  A decrease of PCT levels below or equal to 80% defines a positive change in PCT test result representing a higher risk for 28-day all-cause mortality of patients diagnosed with severe sepsis for septic shock.    Change in PCT >80%  A decrease of PCT levels of more than 80% defines a negative change in PCT result " representing a lower risk for 28-day all-cause mortality of patients diagnosed with severe sepsis or septic shock.       Lactic Acid, Plasma [[id number]]  (Normal) Collected: 12/08/24 1607    Specimen: Blood Updated: 12/08/24 1648     Lactate 1.2 mmol/L     Carbamazepine Level, Total [[id number]]  (Normal) Collected: 12/08/24 1607    Specimen: Blood Updated: 12/08/24 1652     Carbamazepine Level 6.7 mcg/mL     CBC Auto Differential [[id number]]  (Abnormal) Collected: 12/08/24 1607    Specimen: Blood Updated: 12/08/24 1655     WBC 7.62 10*3/mm3      RBC 3.32 10*6/mm3      Hemoglobin 8.4 g/dL      Hematocrit 29.9 %      MCV 90.1 fL      MCH 25.3 pg      MCHC 28.1 g/dL      RDW 17.4 %      RDW-SD 57.2 fl      MPV 11.9 fL      Platelets 145 10*3/mm3     Narrative:      The previously reported component NRBC is no longer being reported. Previous result was 0.3 /100 WBC (Reference Range: 0.0-0.2 /100 WBC) on 12/8/2024 at 1639 CST.    Manual Differential [[id number]]  (Abnormal) Collected: 12/08/24 1607    Specimen: Blood Updated: 12/08/24 1655     Neutrophil % 64.0 %      Lymphocyte % 12.0 %      Monocyte % 5.0 %      Eosinophil % 0.0 %      Basophil % 1.0 %      Bands %  18.0 %      Neutrophils Absolute 6.25 10*3/mm3      Lymphocytes Absolute 0.91 10*3/mm3      Monocytes Absolute 0.38 10*3/mm3      Eosinophils Absolute 0.00 10*3/mm3      Basophils Absolute 0.08 10*3/mm3      Anisocytosis Slight/1+     Microcytes Slight/1+     Polychromasia Slight/1+     WBC Morphology Normal     Clumped Platelets Present     Giant Platelets Mod/2+    Blood Culture - Blood, Arm, Left [[id number]] Collected: 12/08/24 1622    Specimen: Blood from Arm, Left Updated: 12/08/24 1631    Blood Culture - Blood, Arm, Right [[id number]] Collected: 12/08/24 1623    Specimen: Blood from Arm, Right Updated: 12/08/24 1631    Respiratory Panel PCR w/COVID-19(SARS-CoV-2) [facility], NP Swab in UTM/VTM, 2 HR TAT - Swab, Nasopharynx  [[id number]]  (Normal) Collected: 12/08/24 1624    Specimen: Swab from Nasopharynx Updated: 12/08/24 1754     ADENOVIRUS, PCR Not Detected     Coronavirus 229E Not Detected     Coronavirus HKU1 Not Detected     Coronavirus NL63 Not Detected     Coronavirus OC43 Not Detected     COVID19 Not Detected     Human Metapneumovirus Not Detected     Human Rhinovirus/Enterovirus Not Detected     Influenza A PCR Not Detected     Influenza B PCR Not Detected     Parainfluenza Virus 1 Not Detected     Parainfluenza Virus 2 Not Detected     Parainfluenza Virus 3 Not Detected     Parainfluenza Virus 4 Not Detected     RSV, PCR Not Detected     Bordetella pertussis pcr Not Detected     Bordetella parapertussis PCR Not Detected     Chlamydophila pneumoniae PCR Not Detected     Mycoplasma pneumo by PCR Not Detected    Narrative:      In the setting of a positive respiratory panel with a viral infection PLUS a negative procalcitonin without other underlying concern for bacterial infection, consider observing off antibiotics or discontinuation of antibiotics and continue supportive care. If the respiratory panel is positive for atypical bacterial infection (Bordetella pertussis, Chlamydophila pneumoniae, or Mycoplasma pneumoniae), consider antibiotic de-escalation to target atypical bacterial infection.    Wound Culture - Wound, Leg, Right [[id number]] Collected: 12/08/24 1625    Specimen: Wound from Leg, Right Updated: 12/08/24 1641    Blood Gas, Arterial - [[id number]]  (Abnormal) Collected: 12/08/24 1641    Specimen: Arterial Blood Updated: 12/08/24 1640     Site Right Brachial     Hernandez's Test N/A     pH, Arterial 7.368 pH units      pCO2, Arterial 64.3 mm Hg      Comment: 83 Value above reference range        pO2, Arterial 74.7 mm Hg      Comment: 84 Value below reference range        HCO3, Arterial 37.0 mmol/L      Comment: 83 Value above reference range        Base Excess, Arterial 10.1 mmol/L      Comment: 83 Value above reference  range        O2 Saturation, Arterial 95.2 %      Temperature 37.0     Barometric Pressure for Blood Gas 751 mmHg      Modality Nasal Cannula     Flow Rate 5.0 lpm      Ventilator Mode NA     Collected by [id number]     Comment: Meter: [id number]     :  Shahrzad Gonzales, RRT        pCO2, Temperature Corrected 64.3 mm Hg      pH, Temp Corrected 7.368 pH Units      pO2, Temperature Corrected 74.7 mm Hg     POC Occult Blood Stool [[id number]]  (Normal) Resulted: 12/08/24 1739    Specimen: Stool Updated: 12/08/24 1740     Fecal Occult Blood Negative     Lot Number [id number]     Expiration Date 4/30/2025     DEVELOPER LOT NUMBER [id number]     DEVELOPER EXPIRATION DATE 4/30/2025     Positive Control Positive     Negative Control Negative            XR Chest 1 View    Result Date: 12/8/2024  Narrative: EXAM: XR CHEST 1 VW-  DATE: 12/8/2024 3:28 PM  HISTORY: hypoxia   COMPARISON: 9/21/2017.  TECHNIQUE:  Frontal view(s) of the chest submitted.  FINDINGS:  There are patchy opacities in the left mid to lower lung worrisome for pneumonia. Right lung is grossly clear. No effusion or pneumothorax is visualized. There is enlargement of the cardiac silhouette.       Impression:  1. Opacity at the mid to lower left lung worrisome for pneumonia.  This report was signed and finalized on 12/8/2024 5:03 PM by Guevara Hatch.       ED Course  ED Course as of 12/08/24 1845   Sun Dec 08, 2024   1638 I discussed at length with the patient and her sister on her critical presentation.  Patient confirms that she is a DNI/DNR. [TK]   1638 Rectal temperature was measured at 100.8.  Tylenol per G-tube will be ordered. [TK]   1823 I reassessed patient and educated all family members present at bedside of all test results, antibiotics received and plan for admission.  I do have a call out to hospitalist at this time. [TK]   1844 Dr. Cuellar, hospitalist on call, called back and is gracious to admit the patient under their services. [TK]      ED Course  User Index  [TK] Mateo Amaya PA          [facility]      Final diagnoses:   Pneumonia of left lower lobe due to infectious organism   Pressure ulcers of skin of multiple topographic sites   Acute on chronic respiratory failure with hypoxia       Disposition: Patient will be admitted under hospitalists' services.       Mateo Amaya PA  12/08/24 4159

## 2024-12-09 PROBLEM — S31.000A SACRAL WOUND: Status: ACTIVE | Noted: 2024-01-01
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
## 2024-12-09 NOTE — PROGRESS NOTES
"Pharmacy Dosing Service  Automatic Renal Adjustment  Cefepime    Assessment/Action/Plan:  Based on prescribing information provided by the drug , Cefepine 1 gm IV every 8 hours, has been changed to Cefepime 2 gm IV every 8 hours.      Subjective:  Kathie Lynn is a 59 y.o. female.     Objective:  Ht: 165.1 cm (65\"); Wt: 59.9 kg (132 lb)  Actual body weight will be used unless the patient is 20% over Ideal Body Weight, in which case the Adjusted Ideal Body Weight will be used.     Estimated Creatinine Clearance: 180.2 mL/min (A) (by C-G formula based on SCr of 0.31 mg/dL (L)).   Creatinine   Date Value Ref Range Status   12/08/2024 0.31 (L) 0.57 - 1.00 mg/dL Final   06/28/2024 0.28 (L) 0.57 - 1.00 mg/dL Final   06/24/2024 0.26 (L) 0.57 - 1.00 mg/dL Final       Additional Factors Considered:  Patient disposition per documentation  Disease state or condition being treated    Pharmacy will continue to monitor daily and make further adjustment(s) accordingly.    Susanna Garcia, PharmD  12/08/24 20:37 CST    "
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
## 2024-12-09 NOTE — PROGRESS NOTES
"Pharmacy Dosing Service  Pharmacokinetics  Vancomycin Initial Evaluation  Assessment/Action/Plan:  Loading dose?: 1250 mg (received in ED)  Current Order: Vancomycin 1250 mg IVPB every 12 hours  Current end date:12/14/24  Levels: first trough - 12/10/24  Additional antimicrobial agent(s): Zosyn  MRSA Nasal PCR ordered: Yes (Vancomycin indication is pneumonia, bone/joint, SSTI or IAI)    Vancomycin dosage initiated based on population pharmacokinetic parameters. Pharmacy will continue to follow daily and adjust dose accordingly.     Subjective:  Kathie Lynn is a 59 y.o. female with a Vancomycin \"Pharmacy to Dose\" consult for the treatment of pneumonia , day 1 of 5 of treatment.    AUC Model Data:  Loading dose: N/A  Regimen: 1250 mg IV every 12 hours.  Start time: 06:36 on 12/09/2024  Exposure target: AUC24 (range)400-600 mg/L.hr   AUC24,ss: 463 mg/L.hr  Probability of AUC24 > 400: 65 %  Ctrough,ss: 11.9 mg/L  Probability of Ctrough,ss > 20: 17 %  Probability of nephrotoxicity (Lodise FRANCOIS 2009): 7 %    Objective:  Ht: 165.1 cm (65\"); Wt: 57.8 kg (127 lb 6.4 oz)  Estimated Creatinine Clearance: 173.9 mL/min (A) (by C-G formula based on SCr of 0.31 mg/dL (L)).   Creatinine   Date Value Ref Range Status   12/08/2024 0.31 (L) 0.57 - 1.00 mg/dL Final   06/28/2024 0.28 (L) 0.57 - 1.00 mg/dL Final   06/24/2024 0.26 (L) 0.57 - 1.00 mg/dL Final      Lab Results   Component Value Date    WBC 7.62 12/08/2024    WBC 4.08 06/28/2024    WBC 3.90 06/24/2024      Baseline culture results:  Microbiology Results (last 10 days)       Procedure Component Value - Date/Time    Respiratory Panel PCR w/COVID-19(SARS-CoV-2) [facility] In-House, NP Swab in UTM/VTM, 2 HR TAT - Swab, Nasopharynx [[id number]]  (Normal) Collected: 12/08/24 1624    Lab Status: Final result Specimen: Swab from Nasopharynx Updated: 12/08/24 2965     ADENOVIRUS, PCR Not Detected     Coronavirus 229E Not Detected     Coronavirus HKU1 Not Detected     " Coronavirus NL63 Not Detected     Coronavirus OC43 Not Detected     COVID19 Not Detected     Human Metapneumovirus Not Detected     Human Rhinovirus/Enterovirus Not Detected     Influenza A PCR Not Detected     Influenza B PCR Not Detected     Parainfluenza Virus 1 Not Detected     Parainfluenza Virus 2 Not Detected     Parainfluenza Virus 3 Not Detected     Parainfluenza Virus 4 Not Detected     RSV, PCR Not Detected     Bordetella pertussis pcr Not Detected     Bordetella parapertussis PCR Not Detected     Chlamydophila pneumoniae PCR Not Detected     Mycoplasma pneumo by PCR Not Detected    Narrative:      In the setting of a positive respiratory panel with a viral infection PLUS a negative procalcitonin without other underlying concern for bacterial infection, consider observing off antibiotics or discontinuation of antibiotics and continue supportive care. If the respiratory panel is positive for atypical bacterial infection (Bordetella pertussis, Chlamydophila pneumoniae, or Mycoplasma pneumoniae), consider antibiotic de-escalation to target atypical bacterial infection.            Sen Colon, PharmD  12/08/24 21:23 CST

## 2024-12-09 NOTE — PLAN OF CARE
Goal Outcome Evaluation:  Plan of Care Reviewed With: patient, caregiver, family        Progress: no change  Outcome Evaluation: Nutrition assessment completed. Pt has peg tube which is clamped at this time. Initiate Isosource 1.5 bolus of 250 ml BID (7 am and 8pm). Flush tube with 60 ml before and after each bolus feed. Cuco 1 pkt mixed with 240 ml water via peg tube BID. Cardiac diet,thin liquids. Mighty Shakes with all meals. Con to follow for plan of care.

## 2024-12-09 NOTE — PROGRESS NOTES
Adult Nutrition  Assessment/PES    Patient Name:  Kathie Lynn  YOB: 1965  MRN: [id number]  Admit Date:  12/8/2024    Assessment Date:  12/9/2024       Reason for Assessment       Row Name 12/09/24 1210          Reason for Assessment    Reason For Assessment per organizational policy;identified at risk by screening criteria  wound care consult     Diagnosis nutrition related history;pulmonary disease;other (see comments)  wounds     Identified At Risk by Screening Criteria large or nonhealing wound, burn or pressure injury;tube feeding or parenteral nutrition;unintentional loss of 10 lbs or more in the past 2 mos                    Nutrition/Diet History       Row Name 12/09/24 1211          Nutrition/Diet History    Typical Intake (Food/Fluid/EN/PN) Nutrition assessment completed. Pt admitted with pneumonia and multiple pressure injuries stage III to IV. Pt with history of cervical quadriplegia; pelvic pressure injuries. Pt currently receiving cardiac diet,thin liquids. Pt has peg tube which is clamped at this time. Pt reports she receives a full bolus (240 ml) in the am and then 1/2 of a bolus at noon and then 1/2 later in the afternoon.  Initiate Isosource 1.5 250 ml bolus in am (7 am and 8 pm)pm via peg tube in place. Cuco 1 pkt mixed with 240 ml water BID via peg tube. Mighty shakes with all meals.      Food Preferences does not eat meat     Supplemental Drinks/Foods/Additives Mighty Shakes (strawberry) with meals at SNF.     Vitamin/Mineral Supplements Per SNF MAR pt on MVI daily and Prostat BID for wounds     Functional Status nonambulatory     Factors Affecting Nutritional Intake appetite;other (see comments);fatigue;enteral/parenteral device(s)  missing teeth     Additional Information Family reports  pt hx of tracheostomy.                    Labs/Tests/Procedures/Meds       Row Name 12/09/24 1228          Labs/Procedures/Meds    Lab Results Reviewed reviewed, pertinent     Lab Results  Comments BUN,Cr,alb 2.7        Diagnostic Tests/Procedures    Diagnostic Test/Procedure Reviewed reviewed     Diagnostic Test/Procedures Comments XR Chest        Medications    Pertinent Medications Reviewed reviewed     Pertinent Medications Comments Cholecalciferol,lovenox,lasix,vancomycin                    Physical Findings       Row Name 12/09/24 1226          Physical Findings    Overall Physical Appearance nephrosostomy tube,right,NC,BM 12/8;hypoactive bowel sounds,Vinh score 11,generalized weakness     Enteral Access Devices PEG;other (see comments)  clamped                    Estimated/Assessed Needs - Anthropometrics       Row Name 12/09/24 1227          Anthropometrics    Calculation Weight 57.6 kg (127 lb)     Additional Documentation Usual Body Weight (UBW) (Group)        Usual Body Weight (UBW)    Usual Body Weight 57.2 kg (126 lb)  6/24/24        Estimated/Assessed Needs    Additional Documentation Protein Requirements (Group);Estimated Calorie Needs (Group);KCAL/KG (Group);Fluid Requirements (Group)        Estimated Calorie Needs    Estimated Calorie Need Method kcal/kg        KCAL/KG    KCAL/KG 30 Kcal/Kg (kcal);35 Kcal/Kg (kcal)     30 Kcal/Kg (kcal) 1728.21     35 Kcal/Kg (kcal) 2016.245        Protein Requirements    Weight Used For Protein Calculations 57.6 kg (127 lb)     Est Protein Requirement Amount (gms/kg) 1.8 gm protein     Estimated Protein Requirements (gms/day) 103.69        Fluid Requirements    Fluid Requirements (mL/day) 1728  0472-8872 ml (30-35 ml/kg)     Estimated Fluid Requirement Method other (see comments)                    Nutrition Prescription Ordered       Row Name 12/09/24 1231          Nutrition Prescription PO    Current PO Diet Regular     Fluid Consistency Thin     Common Modifiers Cardiac                    Evaluation of Received Nutrient/Fluid Intake       Row Name 12/09/24 1232          Nutrient/Fluid Evaluation    Number of Days Evaluated 1 day     Additional  Documentation Intake Assessment (Group)        Fluid Intake Evaluation    Oral Fluid (mL) 60     Enteral Fluid (mL) 70  water flush     Other Fluid (mL) 100  IVF        PO Evaluation    Number of Days PO Intake Evaluated Other (comment)  this am     Number of Meals 1     % PO Intake 0                 Problem/Interventions:   Problem 1       Row Name 12/09/24 1233          Nutrition Diagnoses Problem 1    Problem 1 Increased Nutrient Needs     Macronutrient Protein;Kcal     Etiology (related to) Medical Diagnosis     Cardiac CAD     Neurological Other (comment)  quadriplegia     Pulmonary/Critical Care A/C respiratory failure;Pneumonia;COPD     Renal KAMILA;Other (comment)  right nephrostomy tube     Skin Pressure injury;Other (comment)  Sacral wound stage IV,right hip p.i. stage IV,right buttock stage IV,left buttock stage IV wounds     Other PMH of Failure to thrive;peg tube with supplemental feedings     Signs/Symptoms (evidenced by) Report of Mnimal PO Intake                      Intervention Goal       Row Name 12/09/24 1241          Intervention Goal    General Reduce/improve symptoms;Meet nutritional needs for age/condition;Disease management/therapy;Nutrition support treatment     PO Meet estimated needs;Establish PO;Tolerate PO     Weight Maintain weight                    Nutrition Intervention       Row Name 12/09/24 1243          Nutrition Intervention    RD/Tech Action Follow Tx progress;Care plan reviewd;Encourage intake;Recommend/ordered     Recommended/Ordered Supplement;EN                    Nutrition Prescription       Row Name 12/09/24 1243          Nutrition Prescription PO    PO Prescription Begin/change supplement     Supplement Mighty Shake;Cuco     Supplement Frequency 3 times a day;2 times a day     New PO Prescription Ordered? Yes  strawberry        Nutrition Prescription EN    Enteral Prescription Enteral begin/change;Enteral to supply     Enteral Route PEG     Product Isosource 1.5 jennifer      Modulars Cuco     Cuco 1 packet     Cuco frequency 2 times a day  via peg tube(mix with 240 ml water)     TF Delivery Method Bolus     TF Bolus Frequency 2 times a day     Water flush (mL)  120 mL     Water Flush Frequency Every feeding     New EN Prescription Ordered? Yes  PO diet as well        EN to Supply    Kcal/Day 930 Kcal/Day     Kcal/Kg 16 Kcal/Kg     Kcal/Kg Weight Method Actual weight     Protein (gm/day) 39 gm/day     Meet Estimated Kcal Need (%) 53 %     Meet Estimated Protein Need (%) 45 %     TF Free H2O (mL) 382 mL     Total Free H2O (mL/day) 1102 mL/day     Fiber Per Day (gm/day) 7.6 gm/day                    Education/Evaluation       Row Name 12/09/24 1253          Education    Education Other (comment)  Likely return to SNF when medically stable        Monitor/Evaluation    Monitor Per protocol;I&O;PO intake;Supplement intake;Pertinent labs;TF delivery/tolerance;Weight;Skin status;Symptoms                     Electronically signed by:  Brigida Hutson RDN, KIAN  12/09/24 13:00 CST

## 2024-12-09 NOTE — PLAN OF CARE
Goal Outcome Evaluation:  Plan of Care Reviewed With: patient        Progress: no change  Outcome Evaluation: VSS. A&Ox4. No changes in NVs, quadriplegic.  Assist x2. 3L NC. Turned and repositioned q2h. 600 ml of urine per R nephrostomy. G-Tube in place. C/o intermittent shoulder pain and anxiety, relieved w/ prns. Abx given per orders. Safety maintained.

## 2024-12-09 NOTE — CASE MANAGEMENT/SOCIAL WORK
Discharge Planning Assessment   [place]     Patient Name: Kathie Lynn  MRN: [id number]  Today's Date: 12/9/2024    Admit Date: 12/8/2024        Discharge Needs Assessment       Row Name 12/09/24 1404       Living Environment    People in Home facility resident    Name(s) of People in Home [facility]    Current Living Arrangements extended care facility    Potentially Unsafe Housing Conditions none    In the past 12 months has the electric, gas, oil, or water company threatened to shut off services in your home? No    Primary Care Provided by other (see comments)    Provides Primary Care For no one    Family Caregiver if Needed other (see comments)    Quality of Family Relationships helpful;involved    Able to Return to Prior Arrangements yes    Living Arrangement Comments Plan return to [facility]       Resource/Environmental Concerns    Resource/Environmental Concerns none    Transportation Concerns none       Transportation Needs    In the past 12 months, has lack of transportation kept you from medical appointments or from getting medications? no    In the past 12 months, has lack of transportation kept you from meetings, work, or from getting things needed for daily living? No       Food Insecurity    Within the past 12 months, you worried that your food would run out before you got the money to buy more. Never true    Within the past 12 months, the food you bought just didn't last and you didn't have money to get more. Never true       Transition Planning    Patient/Family Anticipates Transition to long-term care facility    Patient/Family Anticipated Services at Transition rehabilitation services;skilled nursing    Transportation Anticipated health plan transportation       Discharge Needs Assessment    Readmission Within the Last 30 Days no previous admission in last 30 days    Current Outpatient/Agency/Support Group skilled nursing facility    Equipment Currently Used at Home other (see comments)   [facility] provides DME    Concerns to be Addressed adjustment to diagnosis/illness    Do you want help finding or keeping work or a job? I do not need or want help    Do you want help with school or training? For example, starting or completing job training or getting a high school diploma, GED or equivalent No    Anticipated Changes Related to Illness inability to care for self    Equipment Needed After Discharge other (see comments)    Outpatient/Agency/Support Group Needs skilled nursing facility    Discharge Facility/Level of Care Needs nursing facility, skilled                   Discharge Plan       Row Name 12/09/24 7397       Plan    Plan Comments Pt is from [facility] and does have a bed hold per admissions. No precert needed at ID.    Final Discharge Disposition Code 03 - skilled nursing facility (SNF)                  Continued Care and Services - Admitted Since 12/8/2024    No active coordination exists for this encounter.          Demographic Summary    No documentation.                  Functional Status    No documentation.                  Psychosocial    No documentation.                  Abuse/Neglect    No documentation.                  Legal    No documentation.                  Substance Abuse    No documentation.                  Patient Forms    No documentation.                     LORELEI Mendez
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
## 2024-12-10 NOTE — PROGRESS NOTES
"Pharmacy Dosing Service  Pharmacokinetics  Vancomycin Follow-up Evaluation    Assessment/Action/Plan:  Current Order: Vancomycin 150 mg IVPB every 24 hours  Current end date:12/14/24  Levels: 12/10 - 32.60  Additional antimicrobial agent(s): Cefepime    Hold dose till 12/11 - restart dose at 1250 mg every 24 hours. Random trough before next dose to check clearance. Pharmacy will continue to follow daily and adjust dose accordingly.     Subjective:  Kathie Lynn is a 59 y.o. female currently on Vancomycin 1250 mg IV every 24 hours for the treatment of pneumonia, day 2 of 5 of treatment.    AUC Model Data:  Loading dose: N/A  Regimen: 1250 mg IV every 24 hours.  Start time: 18:53 on 12/10/2024  Exposure target: AUC24 (range)400-600 mg/L.hr   AUC24,ss: 545 mg/L.hr  Probability of AUC24 > 400: 99 %  Ctrough,ss: 13.5 mg/L  Probability of Ctrough,ss > 20: 5 %  Probability of nephrotoxicity (Lodise FRANCOIS 2009): 9 %    Objective:  Ht: 165.1 cm (65\"); Wt: 57.8 kg (127 lb 6.4 oz)  Estimated Creatinine Clearance: 207.3 mL/min (A) (by C-G formula based on SCr of 0.26 mg/dL (L)).   Creatinine   Date Value Ref Range Status   12/10/2024 0.26 (L) 0.57 - 1.00 mg/dL Final   12/09/2024 0.28 (L) 0.57 - 1.00 mg/dL Final   12/08/2024 0.31 (L) 0.57 - 1.00 mg/dL Final      Lab Results   Component Value Date    WBC 6.92 12/10/2024    WBC 9.07 12/09/2024    WBC 7.62 12/08/2024         Lab Results   Component Value Date    VANCOTROUGH 32.60 (C) 12/10/2024       Culture Results:  Microbiology Results (last 10 days)       Procedure Component Value - Date/Time    MRSA Screen, PCR (Inpatient) - Swab, Nares [[id number]]  (Abnormal) Collected: 12/10/24 0121    Lab Status: Final result Specimen: Swab from Nares Updated: 12/10/24 2230     MRSA PCR MRSA Detected    Narrative:      The negative predictive value of this diagnostic test is high and should only be used to consider de-escalating anti-MRSA therapy. A positive result may indicate " colonization with MRSA and must be correlated clinically.    Wound Culture - Wound, Leg, Right [[id number]] Collected: 12/08/24 1625    Lab Status: Preliminary result Specimen: Wound from Leg, Right Updated: 12/09/24 0554     Gram Stain Rare (1+) WBCs per low power field      Moderate (3+) Gram negative bacilli      Few (2+) Gram positive cocci    Respiratory Panel PCR w/COVID-19(SARS-CoV-2) [facility] In-House, NP Swab in UTM/VTM, 2 HR TAT - Swab, Nasopharynx [[id number]]  (Normal) Collected: 12/08/24 1624    Lab Status: Final result Specimen: Swab from Nasopharynx Updated: 12/08/24 0425     ADENOVIRUS, PCR Not Detected     Coronavirus 229E Not Detected     Coronavirus HKU1 Not Detected     Coronavirus NL63 Not Detected     Coronavirus OC43 Not Detected     COVID19 Not Detected     Human Metapneumovirus Not Detected     Human Rhinovirus/Enterovirus Not Detected     Influenza A PCR Not Detected     Influenza B PCR Not Detected     Parainfluenza Virus 1 Not Detected     Parainfluenza Virus 2 Not Detected     Parainfluenza Virus 3 Not Detected     Parainfluenza Virus 4 Not Detected     RSV, PCR Not Detected     Bordetella pertussis pcr Not Detected     Bordetella parapertussis PCR Not Detected     Chlamydophila pneumoniae PCR Not Detected     Mycoplasma pneumo by PCR Not Detected    Narrative:      In the setting of a positive respiratory panel with a viral infection PLUS a negative procalcitonin without other underlying concern for bacterial infection, consider observing off antibiotics or discontinuation of antibiotics and continue supportive care. If the respiratory panel is positive for atypical bacterial infection (Bordetella pertussis, Chlamydophila pneumoniae, or Mycoplasma pneumoniae), consider antibiotic de-escalation to target atypical bacterial infection.    Blood Culture - Blood, Arm, Right [[id number]]  (Normal) Collected: 12/08/24 1623    Lab Status: Preliminary result Specimen: Blood from Arm,  Right Updated: 12/09/24 1632     Blood Culture No growth at 24 hours    Blood Culture - Blood, Arm, Left [[id number]]  (Normal) Collected: 12/08/24 1622    Lab Status: Preliminary result Specimen: Blood from Arm, Left Updated: 12/09/24 1632     Blood Culture No growth at 24 hours            Sen Colon PharmD   12/10/24 05:52 CST

## 2024-12-10 NOTE — CONSULTS
[facility]  INPATIENT WOUND & OSTOMY CONSULTATION    Today's Date: 12/10/24    Patient Name: Kathie Lynn  MRN: [id number]  CSN: [id number]  PCP: Tevin Mendoza MD  Referring Provider:   Consulting Provider (From admission, onward)      Start Ordered     Status Ordering Provider    12/10/24 1200 12/10/24 1200  Inpatient Wound Care MD Consult  Once        Specialty:  Wound Care  Provider:  Isabell Saxena APRN    Acknowledged SCOTT MORRIS    12/08/24 2251 12/08/24 2251    Once        Specialty:  Wound Care  Provider:  Isabell Saxena APRN    Canceled JAIME PAZ    12/08/24 2031 12/08/24 2030  Inpatient Wound Care MD Consult  Once        Specialty:  Wound Care  Provider:  Isabell Saxena APRN    Acknowledged JAIME PAZ           Attending Provider: Scott Morris MD  Length of Stay: 2    SUBJECTIVE   Chief Complaint: ***    HPI: Kathie Lynn, a 59 y.o.female, presents with a past medical history of ***.  A full past medical history is listed below.  Inpatient wound care consulted due to ***      Visit Dx:    ICD-10-CM ICD-9-CM   1. Pneumonia of left lower lobe due to infectious organism  J18.9 486   2. Pressure ulcers of skin of multiple topographic sites  L89.90 707.00     707.20   3. Acute on chronic respiratory failure with hypoxia  J96.21 518.84     799.02       Hospital Problem List:     Left lower lobe pneumonia    Quadriplegia    Sacral wound      History:   Past Medical History:   Diagnosis Date    Anxiety     Burst fracture of cervical vertebra 1984    Calculus of bladder     Calculus of kidney     Cigarette smoker     COPD (chronic obstructive pulmonary disease)     Depression     Dysuria     Hypertension     MI (myocardial infarction)     Neurogenic bladder     Pressure ulcer     SACRAL AREA    Spinal cord injury, cervical region 1984    UTI (urinary tract infection)      Past Surgical History:   Procedure Laterality Date    AUGMENTATION MAMMAPLASTY       BLADDER AUGMENTATION      CARDIAC CATHETERIZATION N/A 2024    Procedure: Left Heart Cath;  Surgeon: Feliberto Burdick DO;  Location:  [facility] CATH INVASIVE LOCATION;  Service: Cardiovascular;  Laterality: N/A;    CERVICAL SPINE POSTERIOR  1984    C6-C7 by Dr. Hull - [facility]     SECTION      COCCYX FRACTURE SURGERY      COLONOSCOPY  2008    GASTROSTOMY FEEDING TUBE INSERTION      GASTROSTOMY FEEDING TUBE INSERTION      moved to left side    PAIN PUMP INSERTION/REVISION  2012    Performed by Dr. David Blanc    PAIN PUMP INSERTION/REVISION Left 2019    Procedure: PAIN PUMP REVISION, left, spine;  Surgeon: Preston Abdalla MD;  Location:  [facility] OR;  Service: Neurosurgery    ULNAR NERVE DECOMPRESSION Bilateral 2015    Performed by Dr. David Blanc      Social History     Socioeconomic History    Marital status:    Tobacco Use    Smoking status: Former     Average packs/day: 0.5 packs/day for 44.0 years (22.0 ttl pk-yrs)     Types: Cigarettes     Start date:      Passive exposure: Past    Smokeless tobacco: Never   Vaping Use    Vaping status: Never Used   Substance and Sexual Activity    Alcohol use: No    Drug use: No    Sexual activity: Defer     Family History   Problem Relation Age of Onset    Cancer Mother        Allergies:  Allergies   Allergen Reactions    Adhesive Tape Itching    Sulfa Antibiotics Rash    Tape Rash     Can tolerate paper tape       Medications:    Current Facility-Administered Medications:     acetaminophen (TYLENOL) tablet 650 mg, 650 mg, Per PEG Tube, Q4H PRN **OR** acetaminophen (TYLENOL) suppository 650 mg, 650 mg, Rectal, Q4H PRN, Jamila Jacobs APRN    ALPRAZolam (XANAX) tablet 0.5 mg, 0.5 mg, Per PEG Tube, 4x Daily, Jamila Jacobs APRN    ascorbic acid (VITAMIN C) tablet 500 mg, 500 mg, Oral, BID, Lois Gutierrez MD, 500 mg at 12/10/24 0854    atorvastatin (LIPITOR) tablet 40 mg, 40 mg, Oral, Nightly, Keo Caraballo,  MD, 40 mg at 12/09/24 2115    atropine 1 % ophthalmic solution 2 drop, 2 drop, Sublingual, BID PRN, Jamila Jacobs APRN    baclofen (LIORESAL) tablet 10 mg, 10 mg, Oral, Q12H, Jamila Jacobs APRN    sennosides-docusate (PERICOLACE) 8.6-50 MG per tablet 2 tablet, 2 tablet, Oral, BID **AND** polyethylene glycol (MIRALAX) packet 17 g, 17 g, Oral, Daily PRN **AND** bisacodyl (DULCOLAX) EC tablet 5 mg, 5 mg, Oral, Daily PRN **AND** bisacodyl (DULCOLAX) suppository 10 mg, 10 mg, Rectal, Daily PRN, Jamila Jacobs APRN, 10 mg at 12/10/24 1454    budesonide-formoterol (SYMBICORT) 160-4.5 MCG/ACT inhaler 2 puff, 2 puff, Inhalation, BID - RT, 2 puff at 12/10/24 0659 **AND** ipratropium (ATROVENT) nebulizer solution 0.5 mg, 0.5 mg, Nebulization, Q6H - RT, Keo Caraballo MD, 0.5 mg at 12/10/24 1239    calcium carbonate (oyster shell) tablet 500 mg, 500 mg, Oral, Daily, 500 mg at 12/10/24 0853 **AND** cholecalciferol (VITAMIN D3) tablet 1,000 Units, 1,000 Units, Oral, Daily, Keo Caraballo MD, 1,000 Units at 12/10/24 0854    carBAMazepine (TEGretol) 100 MG/5ML suspension 200 mg, 200 mg, Oral, BID, Keo Caraballo MD, 200 mg at 12/10/24 0846    cefepime 2000 mg IVPB in 100 mL NS (MBP), 2,000 mg, Intravenous, Q8H, Keo Caraballo MD, 2,000 mg at 12/10/24 1509    celecoxib (CeleBREX) capsule 200 mg, 200 mg, Per PEG Tube, Q12H, Jamila Jacobs APRN    cetirizine (zyrTEC) tablet 10 mg, 10 mg, Oral, Daily, Keo Caraballo MD, 10 mg at 12/10/24 0853    fentaNYL (DURAGESIC) 25 MCG/HR patch 1 patch, 1 patch, Transdermal, Q72H, 1 patch at 12/10/24 1508 **AND** [START ON 12/11/2024] Check Fentanyl Patch Placement, 1 each, Not Applicable, Q12H, Jamila Jacobs APRN    collagenase ointment, , Topical, Q12H, Keo Caraballo MD, Given at 12/10/24 0900    Diclofenac Sodium (VOLTAREN) 1 % gel 4 g, 4 g, Topical, 4x Daily, Jamila Jacobs APRN    diphenhydrAMINE (BENADRYL) capsule 25 mg, 25 mg, Oral,  Q6H PRN **OR** diphenhydrAMINE (BENADRYL) injection 25 mg, 25 mg, Intravenous, Q6H PRN, Jamila Jacobs APRN    diphenoxylate-atropine (LOMOTIL) 2.5-0.025 MG per tablet 1 tablet, 1 tablet, Oral, Q2H PRN, Jamila Jacobs APRN    DULoxetine (CYMBALTA) DR capsule 60 mg, 60 mg, Oral, Daily, Keo Caraballo MD, 60 mg at 12/10/24 0854    escitalopram (LEXAPRO) tablet 20 mg, 20 mg, Per PEG Tube, Daily, Jamila Jacobs APRN    First Mouthwash (Magic Mouthwash) 5 mL, 5 mL, Swish & Spit, Q4H PRN, Jamila Jacobs APRN    fluticasone (FLONASE) 50 MCG/ACT nasal spray 2 spray, 2 spray, Each Nare, Daily, Lois Gutierrez MD, 2 spray at 12/10/24 0855    furosemide (LASIX) injection 20 mg, 20 mg, Intravenous, Q6H PRN **OR** furosemide (LASIX) injection 20 mg, 20 mg, Subcutaneous, Q6H PRN, Jamila Jacobs APRN    furosemide (LASIX) tablet 20 mg, 20 mg, Oral, Every Other Day, Keo Caraballo MD, 20 mg at 12/09/24 0957    furosemide (LASIX) tablet 40 mg, 40 mg, Oral, Every Other Day, Keo Caraballo MD, 40 mg at 12/10/24 0855    guaifenesin (ROBITUSSIN) 100 MG/5ML liquid 400 mg, 400 mg, Oral, Q6H, Keo Caraballo MD, 400 mg at 12/10/24 1329    haloperidol (HALDOL) tablet 1 mg, 1 mg, Per PEG Tube, Q4H PRN **OR** haloperidol lactate (HALDOL) injection 1 mg, 1 mg, Intravenous, Q4H PRN, Jamila Jacobs APRN    haloperidol (HALDOL) tablet 2 mg, 2 mg, Per PEG Tube, Q4H PRN **OR** haloperidol lactate (HALDOL) injection 2 mg, 2 mg, Intravenous, Q4H PRN, Jamila Jacobs, APRN    ipratropium-albuterol (DUO-NEB) nebulizer solution 3 mL, 3 mL, Nebulization, Q4H PRN, Jamila Jacobs, APRN    Cuco pack 1 packet, 1 packet, Per PEG Tube, BID, Lois Gutierrez MD, 1 packet at 12/10/24 0851    LORazepam (ATIVAN) tablet 1 mg, 1 mg, Per PEG Tube, Q1H PRN **OR** LORazepam (ATIVAN) injection 1 mg, 1 mg, Intravenous, Q1H PRN, Jamila Jacobs, APRN    LORazepam (ATIVAN) tablet 2 mg, 2 mg, Per PEG Tube,  Q1H PRN **OR** LORazepam (ATIVAN) injection 2 mg, 2 mg, Intravenous, Q1H PRN, Jamila Jacobs APRGIANFRANCO    midodrine (PROAMATINE) tablet 5 mg, 5 mg, Oral, TID AC, Lois Gutierrez MD, 5 mg at 12/10/24 1126    morphine injection 4 mg, 4 mg, Intravenous, Q1H PRN **OR** morphine concentrated solution 10 mg, 10 mg, Sublingual, Q1H PRN, Jamila Jacobs, APRGIANFRANCO    morphine injection 6 mg, 6 mg, Intravenous, Q1H PRN **OR** morphine concentrated solution 20 mg, 20 mg, Sublingual, Q1H PRN, Jamila Jacobs, APRN    multivitamin with minerals 1 tablet, 1 tablet, Oral, Daily, Lois Gutierrez MD, 1 tablet at 12/10/24 0852    nitroglycerin (NITROSTAT) SL tablet 0.4 mg, 0.4 mg, Sublingual, Q5 Min PRN, Keo Caraballo MD    ondansetron ODT (ZOFRAN-ODT) disintegrating tablet 4 mg, 4 mg, Oral, Q6H PRN **OR** ondansetron (ZOFRAN) injection 4 mg, 4 mg, Intravenous, Q6H PRN, Lois Gutierrez MD, 4 mg at 12/10/24 0919    oxyCODONE-acetaminophen (PERCOCET)  MG per tablet 1 tablet, 1 tablet, Oral, Q6H PRN, Keo Caraballo MD, 1 tablet at 12/10/24 1329    oxyCODONE-acetaminophen (PERCOCET)  MG per tablet 1.5 tablet, 1.5 tablet, Per PEG Tube, Q4H, Jamila Jacobs APRN    pentoxifylline (TRENtal) CR tablet 400 mg, 400 mg, Oral, TID With Meals, Keo Craaballo MD, 400 mg at 12/10/24 1127    Polyvinyl Alcohol-Povidone PF (ARTIFICIAL TEARS) 1.4-0.6 % ophthalmic solution 1 drop, 1 drop, Both Eyes, Q30 Min PRN, Jamila Jacobs APRN    potassium chloride (MICRO-K/KLOR-CON) CR capsule, 10 mEq, Oral, Daily, Keo Caraballo MD, 10 mEq at 12/10/24 0853    pregabalin (LYRICA) capsule 150 mg, 150 mg, Per G Tube, Q12H, Jamila Jacobs APRN    prochlorperazine (COMPAZINE) injection 5 mg, 5 mg, Intravenous, Q6H PRN **OR** prochlorperazine (COMPAZINE) tablet 5 mg, 5 mg, Oral, Q6H PRN **OR** prochlorperazine (COMPAZINE) suppository 25 mg, 25 mg, Rectal, Q12H PRN, Jamila Jacobs APRN    roflumilast  (DALIRESP) tablet 250 mcg, 250 mcg, Oral, Daily, Keo Caraballo MD, 250 mcg at 12/10/24 0852    scopolamine patch 1 mg/72 hr, 1 patch, Transdermal, Q72H PRN, Jamila Jacobs APRN    [COMPLETED] Insert Peripheral IV, , , Once **AND** sodium chloride 0.9 % flush 10 mL, 10 mL, Intravenous, PRN, Keo Caraballo MD    sodium chloride 0.9 % flush 10 mL, 10 mL, Intravenous, Q12H, Keo Caraballo MD, 10 mL at 12/10/24 0855    sodium chloride 0.9 % infusion 40 mL, 40 mL, Intravenous, PRN, Keo Caraballo MD    sodium hypochlorite (HYSEPT) 0.25 % topical solution, , Topical, Q24H, Keo Caraballo MD    zinc sulfate (ZINCATE) capsule 220 mg, 220 mg, Oral, Daily, Lois Gutierrez MD, 220 mg at 12/10/24 0854    zolpidem (AMBIEN) tablet 10 mg, 10 mg, Oral, Nightly, Keo Caraballo MD, 10 mg at 12/09/24 2132    OBJECTIVE     Vitals:    12/10/24 1245   BP:    Pulse: 94   Resp: 18   Temp:    SpO2: 97%       PHYSICAL EXAM: ***  Physical Exam       Results Review:  Lab Results (last 48 hours)       Procedure Component Value Units Date/Time    Vitamin B12 [[id number]]  (Abnormal) Collected: 12/10/24 0434    Specimen: Blood Updated: 12/10/24 1203     Vitamin B-12 >2,000 pg/mL     Narrative:      Results may be falsely increased if patient taking Biotin.      Folate [[id number]]  (Normal) Collected: 12/10/24 0434    Specimen: Blood Updated: 12/10/24 1203     Folate >20.00 ng/mL     Narrative:      Results may be falsely increased if patient taking Biotin.      POC Glucose Once [[id number]]  (Abnormal) Collected: 12/10/24 1133    Specimen: Blood Updated: 12/10/24 1144     Glucose 198 mg/dL      Comment: : [id number] Carvalho AutumnMeter ID: [id number]       Wound Culture - Wound, Leg, Right [[id number]] Collected: 12/08/24 1625    Specimen: Wound from Leg, Right Updated: 12/10/24 0830     Wound Culture Culture in progress     Gram Stain Rare (1+) WBCs per low power field      Moderate (3+) Gram negative bacilli      Few  (2+) Gram positive cocci    POC Glucose Once [[id number]]  (Normal) Collected: 12/10/24 0752    Specimen: Blood Updated: 12/10/24 0803     Glucose 130 mg/dL      Comment: : [id number] Deven CurtisMeter ID: [id number]       CBC & Differential [[id number]]  (Abnormal) Collected: 12/10/24 0434    Specimen: Blood Updated: 12/10/24 0530    Narrative:      The following orders were created for panel order CBC & Differential.  Procedure                               Abnormality         Status                     ---------                               -----------         ------                     CBC Auto Differential[[id number]]        Abnormal            Final result                 Please view results for these tests on the individual orders.    CBC Auto Differential [[id number]]  (Abnormal) Collected: 12/10/24 0434    Specimen: Blood Updated: 12/10/24 0530     WBC 6.92 10*3/mm3      RBC 3.18 10*6/mm3      Hemoglobin 8.0 g/dL      Hematocrit 28.4 %      MCV 89.3 fL      MCH 25.2 pg      MCHC 28.2 g/dL      RDW 17.2 %      RDW-SD 55.9 fl      MPV 12.3 fL      Platelets 121 10*3/mm3     Manual Differential [[id number]]  (Abnormal) Collected: 12/10/24 0434    Specimen: Blood Updated: 12/10/24 0530     Neutrophil % 65.7 %      Lymphocyte % 11.1 %      Monocyte % 2.0 %      Eosinophil % 3.0 %      Basophil % 0.0 %      Bands %  17.2 %      Metamyelocyte % 1.0 %      Neutrophils Absolute 5.73 10*3/mm3      Lymphocytes Absolute 0.77 10*3/mm3      Monocytes Absolute 0.14 10*3/mm3      Eosinophils Absolute 0.21 10*3/mm3      Basophils Absolute 0.00 10*3/mm3      nRBC 1.0 /100 WBC      Anisocytosis Slight/1+     Hypochromia Slight/1+     Poikilocytes Slight/1+     Polychromasia Slight/1+     Rouleaux Slight/1+     Vacuolated Neutrophils Slight/1+     Platelet Estimate Decreased    Vancomycin, Trough Please draw 30-60 minutes prior to 0530 dose. [[id number]]  (Abnormal) Collected: 12/10/24 0434    Specimen: Blood Updated: 12/10/24  0524     Vancomycin Trough 32.60 mcg/mL     Iron Profile [[id number]]  (Abnormal) Collected: 12/10/24 0434    Specimen: Blood Updated: 12/10/24 0522     Iron 12 mcg/dL      Iron Saturation (TSAT) 4 %      Transferrin 183 mg/dL      TIBC 273 mcg/dL     Basic Metabolic Panel [[id number]]  (Abnormal) Collected: 12/10/24 0434    Specimen: Blood Updated: 12/10/24 0522     Glucose 150 mg/dL      BUN 40 mg/dL      Creatinine 0.26 mg/dL      Sodium 146 mmol/L      Potassium 4.0 mmol/L      Chloride 111 mmol/L      CO2 27.0 mmol/L      Calcium 8.9 mg/dL      BUN/Creatinine Ratio 153.8     Anion Gap 8.0 mmol/L      eGFR 125.1 mL/min/1.73     Narrative:      GFR Normal >60  Chronic Kidney Disease <60  Kidney Failure <15      Ferritin [[id number]]  (Abnormal) Collected: 12/10/24 0434    Specimen: Blood Updated: 12/10/24 0522     Ferritin 169.60 ng/mL     Narrative:      Results may be falsely decreased if patient taking Biotin.      MRSA Screen, PCR (Inpatient) - Swab, Nares [[id number]]  (Abnormal) Collected: 12/10/24 0121    Specimen: Swab from Nares Updated: 12/10/24 0437     MRSA PCR MRSA Detected    Narrative:      The negative predictive value of this diagnostic test is high and should only be used to consider de-escalating anti-MRSA therapy. A positive result may indicate colonization with MRSA and must be correlated clinically.    Blood Culture - Blood, Arm, Left [[id number]]  (Normal) Collected: 12/08/24 1622    Specimen: Blood from Arm, Left Updated: 12/09/24 1632     Blood Culture No growth at 24 hours    Blood Culture - Blood, Arm, Right [[id number]]  (Normal) Collected: 12/08/24 1623    Specimen: Blood from Arm, Right Updated: 12/09/24 1632     Blood Culture No growth at 24 hours    CBC Auto Differential [[id number]]  (Abnormal) Collected: 12/09/24 0642    Specimen: Blood Updated: 12/09/24 0754     WBC 9.07 10*3/mm3      RBC 3.47 10*6/mm3      Hemoglobin 8.9 g/dL      Hematocrit 31.3 %      MCV 90.2 fL      MCH 25.6 pg       MCHC 28.4 g/dL      RDW 17.4 %      RDW-SD 57.6 fl      MPV 12.0 fL      Platelets 131 10*3/mm3     Narrative:      The previously reported component NRBC is no longer being reported. Previous result was 0.0 /100 WBC (Reference Range: 0.0-0.2 /100 WBC) on 12/9/2024 at 0714 CST.    Manual Differential [[id number]]  (Abnormal) Collected: 12/09/24 0642    Specimen: Blood Updated: 12/09/24 0754     Neutrophil % 31.0 %      Lymphocyte % 12.0 %      Monocyte % 5.0 %      Eosinophil % 0.0 %      Basophil % 0.0 %      Bands %  51.0 %      Metamyelocyte % 1.0 %      Neutrophils Absolute 7.44 10*3/mm3      Lymphocytes Absolute 1.09 10*3/mm3      Monocytes Absolute 0.45 10*3/mm3      Eosinophils Absolute 0.00 10*3/mm3      Basophils Absolute 0.00 10*3/mm3      Anisocytosis Slight/1+     Basophilic Stippling Slight/1+     Poikilocytes Slight/1+     Polychromasia Slight/1+     Target Cells Slight/1+     Stomatocytes Slight/1+     WBC Morphology Normal     Platelet Estimate Decreased    Basic Metabolic Panel [[id number]]  (Abnormal) Collected: 12/09/24 0642    Specimen: Blood Updated: 12/09/24 0734     Glucose 93 mg/dL      BUN 36 mg/dL      Creatinine 0.28 mg/dL      Sodium 143 mmol/L      Potassium 4.0 mmol/L      Chloride 103 mmol/L      CO2 29.0 mmol/L      Calcium 9.4 mg/dL      BUN/Creatinine Ratio 128.6     Anion Gap 11.0 mmol/L      eGFR 122.9 mL/min/1.73     Narrative:      GFR Normal >60  Chronic Kidney Disease <60  Kidney Failure <15      High Sensitivity Troponin T 2Hr [[id number]]  (Abnormal) Collected: 12/08/24 2142    Specimen: Blood Updated: 12/08/24 2233     HS Troponin T 31 ng/L      Troponin T Delta 0 ng/L     Narrative:      High Sensitive Troponin T Reference Range:  <14.0 ng/L- Negative Female for AMI  <22.0 ng/L- Negative Male for AMI  >=14 - Abnormal Female indicating possible myocardial injury.  >=22 - Abnormal Male indicating possible myocardial injury.   Clinicians would have to utilize clinical  acumen, EKG, Troponin, and serial changes to determine if it is an Acute Myocardial Infarction or myocardial injury due to an underlying chronic condition.         Respiratory Panel PCR w/COVID-19(SARS-CoV-2) [facility] In-House, NP Swab in UTM/VTM, 2 HR TAT - Swab, Nasopharynx [[id number]]  (Normal) Collected: 12/08/24 1624    Specimen: Swab from Nasopharynx Updated: 12/08/24 1754     ADENOVIRUS, PCR Not Detected     Coronavirus 229E Not Detected     Coronavirus HKU1 Not Detected     Coronavirus NL63 Not Detected     Coronavirus OC43 Not Detected     COVID19 Not Detected     Human Metapneumovirus Not Detected     Human Rhinovirus/Enterovirus Not Detected     Influenza A PCR Not Detected     Influenza B PCR Not Detected     Parainfluenza Virus 1 Not Detected     Parainfluenza Virus 2 Not Detected     Parainfluenza Virus 3 Not Detected     Parainfluenza Virus 4 Not Detected     RSV, PCR Not Detected     Bordetella pertussis pcr Not Detected     Bordetella parapertussis PCR Not Detected     Chlamydophila pneumoniae PCR Not Detected     Mycoplasma pneumo by PCR Not Detected    Narrative:      In the setting of a positive respiratory panel with a viral infection PLUS a negative procalcitonin without other underlying concern for bacterial infection, consider observing off antibiotics or discontinuation of antibiotics and continue supportive care. If the respiratory panel is positive for atypical bacterial infection (Bordetella pertussis, Chlamydophila pneumoniae, or Mycoplasma pneumoniae), consider antibiotic de-escalation to target atypical bacterial infection.    POC Occult Blood Stool [[id number]]  (Normal) Resulted: 12/08/24 1739    Specimen: Stool Updated: 12/08/24 1740     Fecal Occult Blood Negative     Lot Number [id number]     Expiration Date 4/30/2025     DEVELOPER LOT NUMBER [id number]     DEVELOPER EXPIRATION DATE 4/30/2025     Positive Control Positive     Negative Control Negative    Procalcitonin [[id number]]   "(Abnormal) Collected: 12/08/24 1607    Specimen: Blood Updated: 12/08/24 1656     Procalcitonin 0.30 ng/mL     Narrative:      As a Marker for Sepsis (Non-Neonates):    1. <0.5 ng/mL represents a low risk of severe sepsis and/or septic shock.  2. >2 ng/mL represents a high risk of severe sepsis and/or septic shock.    As a Marker for Lower Respiratory Tract Infections that require antibiotic therapy:    PCT on Admission    Antibiotic Therapy       6-12 Hrs later    >0.5                Strongly Recommended  >0.25 - <0.5        Recommended   0.1 - 0.25          Discouraged              Remeasure/reassess PCT  <0.1                Strongly Discouraged     Remeasure/reassess PCT    As 28 day mortality risk marker: \"Change in Procalcitonin Result\" (>80% or <=80%) if Day 0 (or Day 1) and Day 4 values are available. Refer to http://[email or web address]cal[facility]    Change in PCT <=80%  A decrease of PCT levels below or equal to 80% defines a positive change in PCT test result representing a higher risk for 28-day all-cause mortality of patients diagnosed with severe sepsis for septic shock.    Change in PCT >80%  A decrease of PCT levels of more than 80% defines a negative change in PCT result representing a lower risk for 28-day all-cause mortality of patients diagnosed with severe sepsis or septic shock.       CBC & Differential [[id number]]  (Abnormal) Collected: 12/08/24 1607    Specimen: Blood Updated: 12/08/24 1655    Narrative:      The following orders were created for panel order CBC & Differential.  Procedure                               Abnormality         Status                     ---------                               -----------         ------                     CBC Auto Differential[[id number]]        Abnormal            Final result                 Please view results for these tests on the individual orders.    CBC Auto Differential [[id number]]  (Abnormal) Collected: 12/08/24 1607    Specimen: Blood " Updated: 12/08/24 1655     WBC 7.62 10*3/mm3      RBC 3.32 10*6/mm3      Hemoglobin 8.4 g/dL      Hematocrit 29.9 %      MCV 90.1 fL      MCH 25.3 pg      MCHC 28.1 g/dL      RDW 17.4 %      RDW-SD 57.2 fl      MPV 11.9 fL      Platelets 145 10*3/mm3     Narrative:      The previously reported component NRBC is no longer being reported. Previous result was 0.3 /100 WBC (Reference Range: 0.0-0.2 /100 WBC) on 12/8/2024 at 1639 CST.    Manual Differential [[id number]]  (Abnormal) Collected: 12/08/24 1607    Specimen: Blood Updated: 12/08/24 1655     Neutrophil % 64.0 %      Lymphocyte % 12.0 %      Monocyte % 5.0 %      Eosinophil % 0.0 %      Basophil % 1.0 %      Bands %  18.0 %      Neutrophils Absolute 6.25 10*3/mm3      Lymphocytes Absolute 0.91 10*3/mm3      Monocytes Absolute 0.38 10*3/mm3      Eosinophils Absolute 0.00 10*3/mm3      Basophils Absolute 0.08 10*3/mm3      Anisocytosis Slight/1+     Microcytes Slight/1+     Polychromasia Slight/1+     WBC Morphology Normal     Clumped Platelets Present     Giant Platelets Mod/2+    Carbamazepine Level, Total [[id number]]  (Normal) Collected: 12/08/24 1607    Specimen: Blood Updated: 12/08/24 1652     Carbamazepine Level 6.7 mcg/mL     Comprehensive Metabolic Panel [[id number]]  (Abnormal) Collected: 12/08/24 1607    Specimen: Blood Updated: 12/08/24 1652     Glucose 124 mg/dL      BUN 49 mg/dL      Creatinine 0.31 mg/dL      Sodium 139 mmol/L      Potassium 4.8 mmol/L      Comment: Slight hemolysis detected by analyzer. Result may be falsely elevated.        Chloride 100 mmol/L      CO2 32.0 mmol/L      Calcium 9.5 mg/dL      Total Protein 7.6 g/dL      Albumin 2.7 g/dL      ALT (SGPT) 28 U/L      AST (SGOT) 32 U/L      Comment: Slight hemolysis detected by analyzer. Result may be falsely elevated.        Alkaline Phosphatase 129 U/L      Total Bilirubin 0.2 mg/dL      Globulin 4.9 gm/dL      A/G Ratio 0.6 g/dL      BUN/Creatinine Ratio 158.1     Anion Gap 7.0 mmol/L       eGFR 119.9 mL/min/1.73     Narrative:      GFR Normal >60  Chronic Kidney Disease <60  Kidney Failure <15      Lactic Acid, Plasma [[id number]]  (Normal) Collected: 12/08/24 1607    Specimen: Blood Updated: 12/08/24 1648     Lactate 1.2 mmol/L     BNP [[id number]]  (Abnormal) Collected: 12/08/24 1607    Specimen: Blood Updated: 12/08/24 1646     proBNP 2,050.0 pg/mL     Narrative:      This assay is used as an aid in the diagnosis of individuals suspected of having heart failure. It can be used as an aid in the diagnosis of acute decompensated heart failure (ADHF) in patients presenting with signs and symptoms of ADHF to the emergency department (ED). In addition, NT-proBNP of <300 pg/mL indicates ADHF is not likely.    Age Range Result Interpretation  NT-proBNP Concentration (pg/mL:      <50             Positive            >450                   Gray                 300-450                    Negative             <300    50-75           Positive            >900                  Gray                300-900                  Negative            <300      >75             Positive            >1800                  Gray                300-1800                  Negative            <300    High Sensitivity Troponin T [[id number]]  (Abnormal) Collected: 12/08/24 1607    Specimen: Blood Updated: 12/08/24 1646     HS Troponin T 31 ng/L     Narrative:      High Sensitive Troponin T Reference Range:  <14.0 ng/L- Negative Female for AMI  <22.0 ng/L- Negative Male for AMI  >=14 - Abnormal Female indicating possible myocardial injury.  >=22 - Abnormal Male indicating possible myocardial injury.   Clinicians would have to utilize clinical acumen, EKG, Troponin, and serial changes to determine if it is an Acute Myocardial Infarction or myocardial injury due to an underlying chronic condition.         Blood Gas, Arterial - [[id number]]  (Abnormal) Collected: 12/08/24 1641    Specimen: Arterial Blood Updated: 12/08/24 1640     Site  Right Brachial     Hernandez's Test N/A     pH, Arterial 7.368 pH units      pCO2, Arterial 64.3 mm Hg      Comment: 83 Value above reference range        pO2, Arterial 74.7 mm Hg      Comment: 84 Value below reference range        HCO3, Arterial 37.0 mmol/L      Comment: 83 Value above reference range        Base Excess, Arterial 10.1 mmol/L      Comment: 83 Value above reference range        O2 Saturation, Arterial 95.2 %      Temperature 37.0     Barometric Pressure for Blood Gas 751 mmHg      Modality Nasal Cannula     Flow Rate 5.0 lpm      Ventilator Mode NA     Collected by [id number]     Comment: Meter: [id number]     :  Shahrzad Gonzales, RRT        pCO2, Temperature Corrected 64.3 mm Hg      pH, Temp Corrected 7.368 pH Units      pO2, Temperature Corrected 74.7 mm Hg     [facility] Draw [[id number]] Collected: 12/08/24 1607    Specimen: Blood Updated: 12/08/24 1632    Narrative:      The following orders were created for panel order [facility] Draw.  Procedure                               Abnormality         Status                     ---------                               -----------         ------                     Green Top (Gel)[[id number]]                                  Final result               Lavender Top[[id number]]                                     Final result               Red Top[[id number]]                                          Final result               Light Blue Top[[id number]]                                   Final result                 Please view results for these tests on the individual orders.    Green Top (Gel) [[id number]] Collected: 12/08/24 1607    Specimen: Blood Updated: 12/08/24 1632     Extra Tube Hold for add-ons.     Comment: Auto resulted.       Lavender Top [[id number]] Collected: 12/08/24 1607    Specimen: Blood Updated: 12/08/24 1632     Extra Tube hold for add-on     Comment: Auto resulted       Red Top [[id number]] Collected: 12/08/24 1607    Specimen: Blood  Updated: 12/08/24 1632     Extra Tube Hold for add-ons.     Comment: Auto resulted.       Light Blue Top [[id number]] Collected: 12/08/24 1607    Specimen: Blood Updated: 12/08/24 1632     Extra Tube Hold for add-ons.     Comment: Auto resulted             Imaging Results (Last 72 Hours)       Procedure Component Value Units Date/Time    XR Chest 1 View [[id number]] Collected: 12/08/24 1702     Updated: 12/08/24 1706    Narrative:      EXAM: XR CHEST 1 VW-      DATE: 12/8/2024 3:28 PM     HISTORY: hypoxia       COMPARISON: 9/21/2017.     TECHNIQUE:  Frontal view(s) of the chest submitted.     FINDINGS:    There are patchy opacities in the left mid to lower lung worrisome for  pneumonia. Right lung is grossly clear. No effusion or pneumothorax is  visualized. There is enlargement of the cardiac silhouette.          Impression:         1. Opacity at the mid to lower left lung worrisome for pneumonia.     This report was signed and finalized on 12/8/2024 5:03 PM by Guevara Hatch.                  ASSESSMENT/PLAN       Examination and evaluation of wound(s) was performed.    DIAGNOSIS:   ***    PLAN:   Orders placed for wound care and pressure/moisture management as listed below.       Start     Ordered    12/10/24 1515  Wound Care Pressure Injury  Every 12 Hours        Question Answer Comment   Wound Locations Sacrum, right ischial tuberosity, right poster hip, left posterior upper leg    Wound Care Instructions Clean with NS.  Apply Dakin's moistened gauze to fill wound. Do not pack tightly. Cover with silicone border foam dressing. Change every 12 hours.    Cleanse Normal Saline    Intervention Fluffed Gauze    Moistened? Yes    Moisten With Dakins Solution 1/4 Strength    Dressing: Silicone Border Dressing        12/10/24 1513    12/10/24 1514  Specialty Bed Dolphin FIS Mattress  Once        Comments: For Dolphin FIS Mattress, call [employer] to order a Effie bed frame.  If bariatric/bariatric dolphin, [employer] provides  frame, mattress, pump, and trapeze (if needed).  Nurse or HUC is to call [employer], the rental company, to order the equipment, provide patient's name, room number, and if in isolation. [phone].   Question:  Specialty Bed needed  Answer:  Shreya FIS Mattress    12/10/24 1513    12/10/24 1512  Turn Patient  Now Then Every 2 Hours         12/10/24 1513    12/10/24 1512  Head of Bed 30 Degrees or Less (Unless Contraindicated)  Until Discontinued         12/10/24 1513    12/10/24 1512  Elevate Heels Off of Bed  Until Discontinued         12/10/24 1513    12/10/24 1512  Use Seat Cushion When Up In Chair  Continuous         12/10/24 1513    12/10/24 1512  Silicone Border Dressing to Bony Prominences  Every Shift      Comments: Apply silicone border foam dressing per protocol to bilateral heels for protection.  Nursing to change dressing every 3 days and PRN if soiled. Nursing is to peel back dressing with every assessment to assess skin underneath dressing. No barrier cream under dressing.    12/10/24 1513    12/10/24 1512  Do NOT Rub or Massage Any Bony Prominence  Continuous         12/10/24 1513    12/10/24 1512  Use Repositioning Wedge to Position Patient  Continuous        Comments: Use Comfort Glide repositioning sheet and wedges to position patient.    12/10/24 1513    Unscheduled  Wound Care  As Needed      Question:  Wound Care Instructions  Answer:  Apply Moisture Barrier After Any Incontinence    12/10/24 1513                     Discussed findings and treatment plan including risks, benefits, and treatment options with *** in detail. Patient agreed with treatment plan.      This document has been electronically signed by COLE Myles on 12/10/2024 15:10 CST
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
## 2024-12-10 NOTE — CASE MANAGEMENT/SOCIAL WORK
Continued Stay Note   [facility]     Patient Name: Kathie Lynn  MRN: [id number]  Today's Date: 12/10/2024    Admit Date: 12/8/2024        Discharge Plan       Row Name 12/10/24 1448       Plan    Plan Comments Spoke with LILLY MANUEL Sister   [phone] re: order of returning to [facility] with hospice when under more pain control. Sister is saying pt is wanting to stay here awhile and tomorrow might be a better day to discuss.  Will follow up tomorrow.                   Discharge Codes    No documentation.                       DONNA StearnsW

## 2024-12-10 NOTE — PLAN OF CARE
Goal Outcome Evaluation:  Plan of Care Reviewed With: patient, family        Progress: no change  Outcome Evaluation: Patient A/O x 4. VSS. On 2 L O2 NC. Meds crushed and given in PEG tube, as well as tube feeding and Cuco packet.  Turn Q 2. In/out cath per suprapubic catheter twice overnight. Patient declined dressing change as it was done later yesterday, so would rather wait til morning. Vanco trough very high, so AM dose held per pharmacy. No neuro changes. Savety maintained.
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
## 2024-12-11 NOTE — PLAN OF CARE
Goal Outcome Evaluation:  Plan of Care Reviewed With: patient        Progress: declining        Pt a/o x4. Pt is on 2L of o2 for comfort. Pt and family decided to do comfort care. Q2 turns. Wound care completed per orders. Call light within reach. Safety maintained

## 2024-12-11 NOTE — CASE MANAGEMENT/SOCIAL WORK
Continued Stay Note   [place]     Patient Name: Kathie Lynn  MRN: [id number]  Today's Date: 12/11/2024    Admit Date: 12/8/2024        Discharge Plan       Row Name 12/11/24 1640       Plan    Plan Comments Noted plan is for return to [facility] after abx completed. Last dose is tomorrow evening. [facility] is the only provider for [facility]. MINDY has provided referral packet to Linda with [facility].Linda will meet with pt/family tomorrow. MINDY has updated admissions at [facility] of plan. Plan dc to [facility] on Friday.    Final Discharge Disposition Code 03 - skilled nursing facility (SNF)                   Discharge Codes    No documentation.                       LORELEI Mendez

## 2024-12-11 NOTE — PLAN OF CARE
Problem: Adult Inpatient Plan of Care  Goal: Plan of Care Review  Outcome: Progressing  Flowsheets (Taken 12/11/2024 1441)  Outcome Evaluation: pt A&O. VSS. O2 @ 4L NC. meds crushed and given in PEG tube. tube feeding and Cuco packet also given. turn q2h. in/out cath suprapubic cath. IV abx given. pt has rested more today as compared to previous days per family and MDs. continue to monitor.  Goal: Patient-Specific Goal (Individualized)  Outcome: Progressing  Goal: Absence of Hospital-Acquired Illness or Injury  Outcome: Progressing  Intervention: Identify and Manage Fall Risk  Recent Flowsheet Documentation  Taken 12/11/2024 1200 by Carla Luke, RN  Safety Promotion/Fall Prevention:   activity supervised   assistive device/personal items within reach   clutter free environment maintained   fall prevention program maintained   nonskid shoes/slippers when out of bed   room organization consistent   safety round/check completed  Taken 12/11/2024 1155 by Carla Luke, RN  Safety Promotion/Fall Prevention:   activity supervised   assistive device/personal items within reach   clutter free environment maintained   fall prevention program maintained   nonskid shoes/slippers when out of bed   room organization consistent   safety round/check completed  Taken 12/11/2024 1100 by Carla Luke, RN  Safety Promotion/Fall Prevention:   activity supervised   assistive device/personal items within reach   clutter free environment maintained   fall prevention program maintained   nonskid shoes/slippers when out of bed   room organization consistent   safety round/check completed  Taken 12/11/2024 0900 by Carla Luke, RN  Safety Promotion/Fall Prevention:   activity supervised   assistive device/personal items within reach   clutter free environment maintained   fall prevention program maintained   nonskid shoes/slippers when out of bed   room organization consistent   safety round/check completed  Taken  12/11/2024 0800 by Carla Luke, RN  Safety Promotion/Fall Prevention:   activity supervised   assistive device/personal items within reach   clutter free environment maintained   fall prevention program maintained   nonskid shoes/slippers when out of bed   room organization consistent   safety round/check completed  Intervention: Prevent Skin Injury  Recent Flowsheet Documentation  Taken 12/11/2024 1000 by Carla Luke, RN  Body Position:   right   turned  Goal: Optimal Comfort and Wellbeing  Outcome: Progressing  Goal: Readiness for Transition of Care  Outcome: Progressing   Goal Outcome Evaluation:              Outcome Evaluation: pt A&O. VSS. O2 @ 4L NC. meds crushed and given in PEG tube. tube feeding and Cuco packet also given. turn q2h. in/out cath suprapubic cath. IV abx given. pt has rested more today as compared to previous days per family and MDs. continue to monitor.
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
## 2024-12-11 NOTE — PLAN OF CARE
Goal Outcome Evaluation:                 Pt is comfort care. Meds administered through PEG tube per orders. PRN ativan given x1. Pt has been resting most of the night. Abx given per orders. Wound care provided per orders. Straight cath x2 this shift. Family at bedside. Call light in reach. Safety maintained.

## 2024-12-12 NOTE — PLAN OF CARE
Goal Outcome Evaluation:  Plan of Care Reviewed With: patient, sibling        Progress: no change  Outcome Evaluation: A/Ox3, disoriented to the year. Pt repeats requests frequently. PRN Percocet given x1, good relief noted. IV abx infused. 4L NC in place. q6 hour I/O cath via suprapbic, good output noted both times. Pt tolerating TFs. Sibling remains at bs. Q2 turning. PRN suction at times. Safety maintained.

## 2024-12-12 NOTE — PLAN OF CARE
Goal Outcome Evaluation:           Progress: no change   Pt A/Ox3, disorientated to year. VSS on 4L NC. IV to RFA, IID. Last dose of ABX given. R nephrostomy, in & out cath with 18Fr coude Q4-6. PEG tube to LUQ, meds crushed in tube. Gave sublingual pain meds. WC per order. Paraplegic, min movement in arms. Bedfast. Q2 turns. Will go back to SNF in AM with hospice. Safety maintained. Family at BS. Call light in reach.
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
## 2024-12-12 NOTE — PROGRESS NOTES
Nutrition Services    Patient Name:  Kathie Lynn  YOB: 1965  MRN: [id number]  Admit Date:  12/8/2024    Nutrition follow up. Pt now comfort meaures. Pt receiving Cardiac diet; diet liberalized today to Regular diet. Insufficient po intake per review of intake report. Pt has peg tube and receiving eneral nutrition. Isosource 1.5 240 ml BID. Cuco BID to support wound healiing. Pt admitted with peg tube and was receiving one bolus in the am and 120-125 ml one at 1300 and the next in the afternoon. Upon initial assessment pt reported poor appetite and received mighty shakes at [facility] and received bolus feeds. Pt has received 1010 ml of enteral fluid over the past three days; which is 210% greater than enteral volume prescribed tube feeding volume; likely water flush being added to tube feeding flush. Cuco 1 pkt via peg tube BID. No new wt to review. Labs: Na+ 146,Gluc. 135,BUN 40,Cr0.26,Alb 2.7. Medication reveiwed. Cont Regular diet. Cont enteral nutrition per orders. Cont to follow for plan of care.     Electronically signed by:  Brigdia Hutson RDN, LD  12/12/24 15:45 CST

## 2024-12-12 NOTE — CASE MANAGEMENT/SOCIAL WORK
Continued Stay Note  [facility]     Patient Name: Kathie Lynn  MRN: [id number]  Today's Date: 12/12/2024    Admit Date: 12/8/2024        Discharge Plan       Row Name 12/12/24 1633       Plan    Plan --    Roadmap to Recovery Yes    Final Note Call report for [facility] is [phone]. Pharmacy correct in [place] for [facility]. [facility] plans to admit pt at [facility] at 2pm tomorrow. Fax number for [facility] is [phone]      Row Name 12/12/24 1631       Plan    Plan Comments Plan is for pt to dc to [facility] tomorrow. [facility] plans to admit pt at facility at 2pm tomorrow. Pharmacy correct in [place] for [facility]. Physician and family aware.    Final Discharge Disposition Code 04 - intermediate care facility                   Discharge Codes    No documentation.                       LORELEI Mendez

## 2024-12-12 NOTE — CASE MANAGEMENT/SOCIAL WORK
Continued Stay Note   [facility]     Patient Name: Kathie Lynn  MRN: [id number]  Today's Date: 12/12/2024    Admit Date: 12/8/2024        Discharge Plan       Row Name 12/12/24 1118       Plan    Plan Comments Linda sanchez [facility] is meeting with family at 11:30am today.  has notified admissions at [facility] and faxed updates. Pt is ready for dc back to [facility] today after last IV dose if [facility] can take back today. Hospice will not be able to admit pt at facility until tomorrow. Await answer from Cheryl at [facility] regarding pt returning today.                   Discharge Codes    No documentation.                       LORELEI Mendez

## 2024-12-13 NOTE — PLAN OF CARE
Goal Outcome Evaluation:  Plan of Care Reviewed With: patient        Progress: no change  Outcome Evaluation: Comfort care. Alert. Paraplegic. Wound care. Meds through PEG. Suprapubic catheter drained q 6. Turning with wedges. 4 L NC. Prn sublingual morphine. Appears to be resting well. Sister at bs.
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
## 2024-12-13 NOTE — PROGRESS NOTES
"Enter Query Response Below      Query Response: Bacterial pneumonia -unspecified organism             If applicable, please update the problem list.     Patient: Kathie Lynn        : 1965  Account: [id number]           Admit Date:         How to Respond to this query:       a. Click New Note     b. Answer query within the yellow box.                c. Update the Problem List, if applicable.      If you have any questions about this query contact me at: [email or web address]     Dr. Gutierrez,    Patient presented from nursing facility  for hypotension, hypoxia, shortness of breath, and worsening pressure wounds. Notes include left lower lobe pneumonia, and chest x-ray dated  notes \"Opacity at the mid to lower left lung worrisome for pneumonia.\" Patient made comfort measures only on 12/10. Patient treated with monitoring, supplemental oxygen, wound care consult, IV Zosyn, IV vancomycin, and IV cefepime.     Please clarify the type of pneumonia treated/monitored:    - Bacterial pneumonia  - Other ___________________  - Unable to clinically determine    By submitting this query, we are merely seeking further clarification of documentation to accurately reflect all conditions that you are monitoring, evaluating, treating or that extend the hospitalization or utilize additional resources of care. Please utilize your independent clinical judgment when addressing the question(s) above.     This query and your response, once completed, will be entered into the legal medical record.    Sincerely,  Danie Oneil RN, CDS  Clinical Documentation Integrity Program     "

## 2024-12-13 NOTE — SIGNIFICANT NOTE
I was called by the nurse letting me know that patient passed away at 6 AM this morning.  She was receiving comfort measures as planned.  Family at bedside informed.

## 2025-01-27 ENCOUNTER — TELEPHONE (OUTPATIENT)
Dept: NEUROLOGY | Age: 60
End: 2025-01-27

## (undated) DEVICE — MODEL AT P65, P/N 701554-001KIT CONTENTS: HAND CONTROLLER, 3-WAY HIGH-PRESSURE STOPCOCK WITH ROTATING END AND PREMIUM HIGH-PRESSURE TUBING: Brand: ANGIOTOUCH® KIT

## (undated) DEVICE — SHEET,DRAPE,53X77,STERILE: Brand: MEDLINE

## (undated) DEVICE — SYR SLP TP 10ML DISP

## (undated) DEVICE — ANTIBACTERIAL VIOLET BRAIDED (POLYGLACTIN 910), SYNTHETIC ABSORBABLE SUTURE: Brand: COATED VICRYL

## (undated) DEVICE — DRSNG TELFA PAD NONADH STR 1S 3X8IN

## (undated) DEVICE — GW STARTER FXD CORE J .035 3X150CM 3MM

## (undated) DEVICE — PINNACLE INTRODUCER SHEATH: Brand: PINNACLE

## (undated) DEVICE — ANGIO-SEAL VIP VASCULAR CLOSURE DEVICE: Brand: ANGIO-SEAL

## (undated) DEVICE — MODEL BT2000 P/N 700287-012KIT CONTENTS: MANIFOLD WITH SALINE AND CONTRAST PORTS, SALINE TUBING WITH SPIKE AND HAND SYRINGE, TRANSDUCER: Brand: BT2000 AUTOMATED MANIFOLD KIT

## (undated) DEVICE — ELECTRD BLD EDGE/INSUL1P 2.4X5.1MM STRL

## (undated) DEVICE — SUT PROLN 0 MO6 30IN 8418H

## (undated) DEVICE — DISPOSABLE IRRIGATION BIPOLAR CORD, M1000 TYPE: Brand: KIRWAN

## (undated) DEVICE — FR5 INFINITI MULTIPAC: Brand: INFINITI

## (undated) DEVICE — SYR LUERLOK 20CC BX/50

## (undated) DEVICE — APPL CHLORAPREP W/TINT 26ML ORNG

## (undated) DEVICE — PAD MINOR UNIVERSAL: Brand: MEDLINE INDUSTRIES, INC.

## (undated) DEVICE — CVR HNDL LIGHT RIGID

## (undated) DEVICE — 3M™ STERI-STRIP™ REINFORCED ADHESIVE SKIN CLOSURES, R1547, 1/2 IN X 4 IN (12 MM X 100 MM), 6 STRIPS/ENVELOPE: Brand: 3M™ STERI-STRIP™

## (undated) DEVICE — IRRIGATOR BULB ASEPTO 60CC STRL

## (undated) DEVICE — SUT MNCRYL 4/0 PS2 27IN UD MCP426H

## (undated) DEVICE — SURGICAL SUCTION CONNECTING TUBE WITH MALE CONNECTOR AND SUCTION CLAMP, 2 FT. LONG (.6 M), 5 MM I.D.: Brand: CONMED

## (undated) DEVICE — CONN FLX BREATHE CIRCT

## (undated) DEVICE — 3M™ IOBAN™ 2 ANTIMICROBIAL INCISE DRAPE 6650EZ: Brand: IOBAN™ 2

## (undated) DEVICE — PROGRAMMER PUMP EXT FOR SYNCHROMEDII TH90T01

## (undated) DEVICE — KT NDL GUIDE STRL 18GA

## (undated) DEVICE — 3M™ STERI-DRAPE™ INSTRUMENT POUCH 1018: Brand: STERI-DRAPE™

## (undated) DEVICE — GLV SURG BIOGEL M LTX PF 7

## (undated) DEVICE — GLV SURG BIOGEL LTX PF 8

## (undated) DEVICE — PROXIMATE RH ROTATING HEAD SKIN STAPLERS (35 WIDE) CONTAINS 35 STAINLESS STEEL STAPLES: Brand: PROXIMATE

## (undated) DEVICE — SOL IRR NACL 0.9PCT BT 1000ML

## (undated) DEVICE — PK CATH CARD 30 CA/4

## (undated) DEVICE — PAD, DEFIB, ADULT, RADIOTRANS, PHYSIO: Brand: MEDLINE

## (undated) DEVICE — ADHS LIQ MASTISOL 2/3ML

## (undated) DEVICE — CANN NASL ETCO2 LO/FLO A/

## (undated) DEVICE — GLV SURG BIOGEL LTX PF 6 1/2

## (undated) DEVICE — CODMAN® SURGICAL PATTIES 1/2" X1 1/2" (1.27CM X 3.81CM): Brand: CODMAN®

## (undated) DEVICE — SOLIDIFIER LIQUI LOC PLUS 2000CC

## (undated) DEVICE — BNDR ABD 4PANEL 12IN 46 TO 62IN

## (undated) DEVICE — PK TURNOVER RM ADV

## (undated) DEVICE — SUT ETHIB 2/0 RB1 DA 30IN GRN MX553

## (undated) DEVICE — NDL HYPO PRECISIONGLIDE REG 22G 1 1/2